# Patient Record
Sex: MALE | Race: WHITE | Employment: OTHER | URBAN - METROPOLITAN AREA
[De-identification: names, ages, dates, MRNs, and addresses within clinical notes are randomized per-mention and may not be internally consistent; named-entity substitution may affect disease eponyms.]

---

## 2014-04-18 LAB — COLONOSCOPY, EXTERNAL: NORMAL

## 2017-05-16 ENCOUNTER — OFFICE VISIT (OUTPATIENT)
Dept: FAMILY MEDICINE CLINIC | Age: 62
End: 2017-05-16

## 2017-05-16 VITALS
BODY MASS INDEX: 25.41 KG/M2 | WEIGHT: 198 LBS | RESPIRATION RATE: 18 BRPM | OXYGEN SATURATION: 97 % | HEART RATE: 82 BPM | TEMPERATURE: 97.5 F | SYSTOLIC BLOOD PRESSURE: 207 MMHG | HEIGHT: 74 IN | DIASTOLIC BLOOD PRESSURE: 119 MMHG

## 2017-05-16 DIAGNOSIS — E78.2 MIXED HYPERLIPIDEMIA: ICD-10-CM

## 2017-05-16 DIAGNOSIS — I10 ESSENTIAL HYPERTENSION: Primary | ICD-10-CM

## 2017-05-16 DIAGNOSIS — Z76.89 ENCOUNTER TO ESTABLISH CARE: ICD-10-CM

## 2017-05-16 RX ORDER — FENOFIBRATE 48 MG/1
TABLET, COATED ORAL
COMMUNITY
End: 2017-05-17 | Stop reason: SDUPTHER

## 2017-05-16 RX ORDER — CLOPIDOGREL BISULFATE 75 MG/1
TABLET ORAL
COMMUNITY
End: 2017-05-17 | Stop reason: SDUPTHER

## 2017-05-16 RX ORDER — AMLODIPINE BESYLATE 10 MG/1
TABLET ORAL DAILY
COMMUNITY
End: 2017-05-31 | Stop reason: DRUGHIGH

## 2017-05-16 RX ORDER — ASCORBIC ACID 500 MG
TABLET ORAL
COMMUNITY

## 2017-05-16 RX ORDER — NEBIVOLOL 5 MG/1
TABLET ORAL DAILY
COMMUNITY
End: 2017-05-17 | Stop reason: SDUPTHER

## 2017-05-16 RX ORDER — VALSARTAN 320 MG/1
TABLET ORAL DAILY
COMMUNITY
End: 2017-05-17 | Stop reason: SDUPTHER

## 2017-05-16 NOTE — PROGRESS NOTES
Betsy Ro is here today as a new patient to establish care with provider. 1. Have you been to the ER, urgent care clinic since your last visit? Hospitalized since your last visit? No    2. Have you seen or consulted any other health care providers outside of the [address] [place] [address] since your last visit? Include any pap smears or colon screening. No    Patients BP rechecked, noted to have increased to 230/110; Patient sent to ED per Jose Stock NP; Called to give report to ED at [facility], no answer.

## 2017-05-16 NOTE — PROGRESS NOTES
Chief Complaint   Patient presents with    Establish Care     HPI:    This is a 59 y/o male patient who presents to establish care with a new PCP. Patient denies SOB, CP, dizziness, headache. Denies taking any medication for pain. Former pt of Dr Reyes Self- last seen Novemeber    Hyperlipidemia: currently taking Fenofibrate and was recently prescribed a statin which he states he refused to take because of side effects. HTN: BP elevated today-  207/119  Manual repeat by me - 220/120   He confirms he has not taken BP med since Novemeber      ROS: Pt denies: Wt loss, Fever/Chills, HA, Visual changes, Fatigue, Chest pain, SOB, ALMANZAR, Abd pain, N/V/D/C, Blood in stool or urine, Edema. Pertinent positive as above in HPI. All others were negative      Past Medical History:   Diagnosis Date    Hypercholesterolemia     Hypertension        History reviewed. No pertinent surgical history. History reviewed. No pertinent family history. Social History     Social History    Marital status:      Spouse name: N/A    Number of children: N/A    Years of education: N/A     Occupational History    Not on file. Social History Main Topics    Smoking status: Current Every Day Smoker     Packs/day: 1.00     Types: Cigarettes    Smokeless tobacco: Not on file    Alcohol use Yes      Comment: OCCASSIONALLY    Drug use: No    Sexual activity: Yes     Partners: Female     Other Topics Concern    Not on file     Social History Narrative     Current Outpatient Prescriptions   Medication Sig Dispense Refill    amLODIPine (NORVASC) 10 mg tablet Take  by mouth daily.  ascorbic acid, vitamin C, (VITAMIN C) 500 mg tablet Take  by mouth.  fenofibrate nanocrystallized (TRICOR) 48 mg tablet Take 1 Tab by mouth daily. 30 Tab 1    valsartan (DIOVAN) 320 mg tablet Take 1 Tab by mouth daily. 320 Tab 1    clopidogrel (PLAVIX) 75 mg tab Take 1 Tab by mouth daily.  30 Tab 1    nebivolol (BYSTOLIC) 5 mg tablet Take 1 Tab by mouth daily. 30 Tab 1     No Known Allergies    Physical Exam:    Vital Signs:   Visit Vitals    BP (!) 207/119    Pulse 82    Temp 97.5 °F (36.4 °C) (Oral)    Resp 18    Ht 6' 2\" (1.88 m)    Wt 198 lb (89.8 kg)    SpO2 97%    BMI 25.42 kg/m2         General: a, a & o x 3, afebrile,  interacting appropriately, in no acute distress  HEENT: head normocephalic and atraumatic, conjuctiva clear  Skin: warm and dry, no rashes , no bruises, no skin lesions  Neck: supple, symmetrical, no palpable mass, no thyromegaly  Respiratory: lung sounds clear bilaterally, no wheezes or crackles  Cardiovascular: normal S1S2, regular rate and rhythm, no murmurs  Abdomen: non-distended, normal bowel sounds x 4 quadrants, soft, non-tender to palpation  Musculoskeletal: normal ROM on all joints, no swelling or deformity  Psychiatric: a, a & o x 3, appropriate mood and affect, no thought disorder    Assessment/Plan:      ICD-10-CM ICD-9-CM    1. Essential hypertension I10 401.9 BP elevated in our office today- 220/120  [facility] BP control. He was advised to return to our office tomorrow for BP check tomorrow   2. Encounter to establish care Z76.89 V65.8    3. Mixed hyperlipidemia E78.2 272.2            Additional Notes: Discussed today's diagnosis, treatment plans. Discussed medication indications and side effects. After Visit Summary: Provided and discussed printed patient instructions. Answered questions in relation to today's diagnosis.   Follow-up Disposition: return tomorrow for BP check          ANABELLE Phelan  [address] [facility],[address]

## 2017-05-16 NOTE — MR AVS SNAPSHOT
Visit Information Date & Time Provider Department Dept. Phone Encounter #  
 5/16/2017  3:30 PM Tere Interiano NP [facility] [id number] Follow-up Instructions Return in about 1 day (around 5/17/2017) for follow up, HTN patient advised to go to the ER from our office. BP today elevated 230/130. Upcoming Health Maintenance Date Due Hepatitis C Screening 1955 DTaP/Tdap/Td series (1 - Tdap) 1/12/1975 FOBT Q 1 YEAR AGE 50-75 1/12/2004 ZOSTER VACCINE AGE 60> 1/12/2014 INFLUENZA AGE 9 TO ADULT 8/1/2017 Allergies as of 5/16/2017  Review Complete On: 5/16/2017 By: Alesia Hull LPN No Known Allergies Current Immunizations  Never Reviewed No immunizations on file. Not reviewed this visit You Were Diagnosed With   
  
 Codes Comments Essential hypertension    -  Primary ICD-10-CM: I10 
ICD-9-CM: 401.9 Vitals BP Pulse Temp Resp Height(growth percentile) Weight(growth percentile) (!) 207/119 82 97.5 °F (36.4 °C) (Oral) 18 6' 2\" (1.88 m) 198 lb (89.8 kg) SpO2 BMI Smoking Status 97% 25.42 kg/m2 Current Every Day Smoker BMI and BSA Data Body Mass Index Body Surface Area  
 25.42 kg/m 2 2.17 m 2 Preferred Pharmacy Pharmacy Name Phone [id number], [address] [phone] Your Updated Medication List  
  
   
This list is accurate as of: 5/16/17  3:56 PM.  Always use your most recent med list. amLODIPine 10 mg tablet Commonly known as:  Danielle Million Take  by mouth daily. BYSTOLIC 5 mg tablet Generic drug:  nebivolol Take  by mouth daily. clopidogrel 75 mg Tab Commonly known as:  PLAVIX Take  by mouth. fenofibrate nanocrystallized 48 mg tablet Commonly known as:  [employer] Take  by mouth.  
  
 valsartan 320 mg tablet Commonly known as:  DIOVAN Take  by mouth daily. VITAMIN C 500 mg tablet Generic drug:  ascorbic acid (vitamin C) Take  by mouth. Follow-up Instructions Return in about 1 day (around 5/17/2017) for follow up, HTN patient advised to go to the ER from our office. BP today elevated 230/130. Patient Instructions High Blood Pressure: Care Instructions Your Care Instructions If your blood pressure is usually above 140/90, you have high blood pressure, or hypertension. That means the top number is 140 or higher or the bottom number is 90 or higher, or both. Despite what a lot of people think, high blood pressure usually doesn't cause headaches or make you feel dizzy or lightheaded. It usually has no symptoms. But it does increase your risk for heart attack, stroke, and kidney or eye damage. The higher your blood pressure, the more your risk increases. Your doctor will give you a goal for your blood pressure. Your goal will be based on your health and your age. An example of a goal is to keep your blood pressure below 140/90. Lifestyle changes, such as eating healthy and being active, are always important to help lower blood pressure. You might also take medicine to reach your blood pressure goal. 
Follow-up care is a key part of your treatment and safety. Be sure to make and go to all appointments, and call your doctor if you are having problems. It's also a good idea to know your test results and keep a list of the medicines you take. How can you care for yourself at home? Medical treatment · If you stop taking your medicine, your blood pressure will go back up. You may take one or more types of medicine to lower your blood pressure. Be safe with medicines. Take your medicine exactly as prescribed. Call your doctor if you think you are having a problem with your medicine. · Talk to your doctor before you start taking aspirin every day. Aspirin can help certain people lower their risk of a heart attack or stroke.  But taking aspirin isn't right for everyone, because it can cause serious bleeding. · See your doctor regularly. You may need to see the doctor more often at first or until your blood pressure comes down. · If you are taking blood pressure medicine, talk to your doctor before you take decongestants or anti-inflammatory medicine, such as ibuprofen. Some of these medicines can raise blood pressure. · Learn how to check your blood pressure at home. Lifestyle changes · Stay at a healthy weight. This is especially important if you put on weight around the waist. Losing even 10 pounds can help you lower your blood pressure. · If your doctor recommends it, get more exercise. Walking is a good choice. Bit by bit, increase the amount you walk every day. Try for at least 30 minutes on most days of the week. You also may want to swim, bike, or do other activities. · Avoid or limit alcohol. Talk to your doctor about whether you can drink any alcohol. · Try to limit how much sodium you eat to less than 2,300 milligrams (mg) a day. Your doctor may ask you to try to eat less than 1,500 mg a day. · Eat plenty of fruits (such as bananas and oranges), vegetables, legumes, whole grains, and low-fat dairy products. · Lower the amount of saturated fat in your diet. Saturated fat is found in animal products such as milk, cheese, and meat. Limiting these foods may help you lose weight and also lower your risk for heart disease. · Do not smoke. Smoking increases your risk for heart attack and stroke. If you need help quitting, talk to your doctor about stop-smoking programs and medicines. These can increase your chances of quitting for good. When should you call for help? Call 911 anytime you think you may need emergency care. This may mean having symptoms that suggest that your blood pressure is causing a serious heart or blood vessel problem. Your blood pressure may be over 180/110. For example, call 911 if: · You have symptoms of a heart attack. These may include: ¨ Chest pain or pressure, or a strange feeling in the chest. 
¨ Sweating. ¨ Shortness of breath. ¨ Nausea or vomiting. ¨ Pain, pressure, or a strange feeling in the back, neck, jaw, or upper belly or in one or both shoulders or arms. ¨ Lightheadedness or sudden weakness. ¨ A fast or irregular heartbeat. · You have symptoms of a stroke. These may include: 
¨ Sudden numbness, tingling, weakness, or loss of movement in your face, arm, or leg, especially on only one side of your body. ¨ Sudden vision changes. ¨ Sudden trouble speaking. ¨ Sudden confusion or trouble understanding simple statements. ¨ Sudden problems with walking or balance. ¨ A sudden, severe headache that is different from past headaches. · You have severe back or belly pain. Do not wait until your blood pressure comes down on its own. Get help right away. Call your doctor now or seek immediate care if: 
· Your blood pressure is much higher than normal (such as 180/110 or higher), but you don't have symptoms. · You think high blood pressure is causing symptoms, such as: ¨ Severe headache. ¨ Blurry vision. Watch closely for changes in your health, and be sure to contact your doctor if: 
· Your blood pressure measures 140/90 or higher at least 2 times. That means the top number is 140 or higher or the bottom number is 90 or higher, or both. · You think you may be having side effects from your blood pressure medicine. · Your blood pressure is usually normal, but it goes above normal at least 2 times. Where can you learn more? Go to http://jemma-javi.info/. Enter V520 in the search box to learn more about \"High Blood Pressure: Care Instructions. \" Current as of: August 8, 2016 Content Version: 11.2 © 3101-1343 [employer].  Care instructions adapted under license by [employer] (which disclaims liability or warranty for this information). If you have questions about a medical condition or this instruction, always ask your healthcare professional. [address] any warranty or liability for your use of this information. Introducing [facility] [place] [facility]! [employer] introduces [employer] patient portal. Now you can access parts of your medical record, email your doctor's office, and request medication refills online. 1. In your internet browser, go to [email or web address] 2. Click on the First Time User? Click Here link in the Sign In box. You will see the New Member Sign Up page. 3. Enter your [employer] Access Code exactly as it appears below. You will not need to use this code after youve completed the sign-up process. If you do not sign up before the expiration date, you must request a new code. · [employer] Access Code: [email or web address] Expires: 8/14/2017  3:55 PM 
 
4. Enter the last four digits of your Social Security Number (xxxx) and Date of Birth (mm/dd/yyyy) as indicated and click Submit. You will be taken to the next sign-up page. 5. Create a [employer] ID. This will be your [employer] login ID and cannot be changed, so think of one that is secure and easy to remember. 6. Create a [employer] password. You can change your password at any time. 7. Enter your Password Reset Question and Answer. This can be used at a later time if you forget your password. 8. Enter your e-mail address. You will receive e-mail notification when new information is available in [address] 9. Click Sign Up. You can now view and download portions of your medical record. 10. Click the Download Summary menu link to download a portable copy of your medical information. If you have questions, please visit the Frequently Asked Questions section of the [employer] website. Remember, [employer] is NOT to be used for urgent needs. For medical emergencies, dial 911. Now available from your iPhone and Android! Please provide this summary of care documentation to your next provider. If you have any questions after today's visit, please call [phone].

## 2017-05-16 NOTE — PATIENT INSTRUCTIONS

## 2017-05-17 ENCOUNTER — CLINICAL SUPPORT (OUTPATIENT)
Dept: FAMILY MEDICINE CLINIC | Age: 62
End: 2017-05-17

## 2017-05-17 VITALS
HEART RATE: 66 BPM | DIASTOLIC BLOOD PRESSURE: 93 MMHG | SYSTOLIC BLOOD PRESSURE: 169 MMHG | TEMPERATURE: 98.6 F | RESPIRATION RATE: 16 BRPM | WEIGHT: 198 LBS | BODY MASS INDEX: 25.41 KG/M2 | HEIGHT: 74 IN | OXYGEN SATURATION: 98 %

## 2017-05-17 DIAGNOSIS — E78.2 MIXED HYPERLIPIDEMIA: ICD-10-CM

## 2017-05-17 DIAGNOSIS — I10 ESSENTIAL HYPERTENSION: Primary | ICD-10-CM

## 2017-05-17 RX ORDER — NEBIVOLOL 5 MG/1
5 TABLET ORAL DAILY
Qty: 30 TAB | Refills: 1 | Status: SHIPPED | OUTPATIENT
Start: 2017-05-17 | End: 2017-11-20 | Stop reason: SDUPTHER

## 2017-05-17 RX ORDER — VALSARTAN 320 MG/1
320 TABLET ORAL DAILY
Qty: 320 TAB | Refills: 1 | Status: SHIPPED | OUTPATIENT
Start: 2017-05-17 | End: 2017-11-20 | Stop reason: SDUPTHER

## 2017-05-17 RX ORDER — FENOFIBRATE 48 MG/1
48 TABLET, COATED ORAL DAILY
Qty: 30 TAB | Refills: 1 | Status: SHIPPED | OUTPATIENT
Start: 2017-05-17 | End: 2017-06-01 | Stop reason: ALTCHOICE

## 2017-05-17 RX ORDER — CLOPIDOGREL BISULFATE 75 MG/1
75 TABLET ORAL DAILY
Qty: 30 TAB | Refills: 1 | Status: SHIPPED | OUTPATIENT
Start: 2017-05-17 | End: 2017-07-07 | Stop reason: SDUPTHER

## 2017-05-17 NOTE — PROGRESS NOTES
Chief Complaint   Patient presents with    Follow-up     ED visit for elevated BP 5/16/2017     HPI:    A 60 y.o pleasant male who presents for 1 day follow up elevated blood pressure. Patient was sent to the ER for elevated office BP yesterday of 220/120  Today he states he feels fine. He was stabilized at the ED and sent home on Bystolic and Amlodipine. Today BP still elevated 169/93 but improved from yesterday. Manual repeat by me 167/100  Discussed with patient plan to add a third agent. Patient denies SOB, CP, dizziness, headache. ROS: pertinent positives as noted in HPI. All others were negative. Past Medical History:   Diagnosis Date    Hypercholesterolemia     Hypertension      Social History     Social History    Marital status:      Spouse name: N/A    Number of children: N/A    Years of education: N/A     Occupational History    Not on file. Social History Main Topics    Smoking status: Current Every Day Smoker     Packs/day: 1.00     Types: Cigarettes    Smokeless tobacco: Not on file    Alcohol use Yes      Comment: OCCASSIONALLY    Drug use: No    Sexual activity: Yes     Partners: Female     Other Topics Concern    Not on file     Social History Narrative     Current Outpatient Prescriptions   Medication Sig Dispense Refill    Omega-3 Fatty Acids (FISH OIL) 500 mg cap Take  by mouth.  aspirin 81 mg chewable tablet Take 81 mg by mouth daily.  fenofibrate nanocrystallized (TRICOR) 48 mg tablet Take 1 Tab by mouth daily. 30 Tab 1    valsartan (DIOVAN) 320 mg tablet Take 1 Tab by mouth daily. 320 Tab 1    clopidogrel (PLAVIX) 75 mg tab Take 1 Tab by mouth daily. 30 Tab 1    nebivolol (BYSTOLIC) 5 mg tablet Take 1 Tab by mouth daily. 30 Tab 1    amLODIPine (NORVASC) 10 mg tablet Take  by mouth daily.  ascorbic acid, vitamin C, (VITAMIN C) 500 mg tablet Take  by mouth.        No Known Allergies        Physical Exam:    Vital Signs:   Visit Vitals  BP (!) 169/93    Pulse 66    Temp 98.6 °F (37 °C) (Oral)    Resp 16    Ht 6' 2\" (1.88 m)    Wt 198 lb (89.8 kg)    SpO2 98%    BMI 25.42 kg/m2         General: a, a & o x 3, afebrile,  interacting appropriately, in no acute distress  HEENT: head normocephalic and atraumatic, conjuctiva clear  Respiratory: lung sounds clear bilaterally, no wheezes or crackles  Cardiovascular: normal S1S2, regular rate and rhythm, no murmurs      Assessment/Plan:      ICD-10-CM ICD-9-CM    1. Essential hypertension I10 401.9 valsartan (DIOVAN) 320 mg tablet      clopidogrel (PLAVIX) 75 mg tab      nebivolol (BYSTOLIC) 5 mg tablet    Low salt diet advised. Patient advised the importance of medication compliance for good BP control. 2. Mixed hyperlipidemia E78.2 272.2 fenofibrate nanocrystallized (TRICOR) 48 mg tablet         Additional Notes: Discussed today's diagnosis, treatment plans. Discussed medication indications and side effects. After Visit Summary: Provided and discussed printed patient instructions. Answered questions in relation to today's diagnosis.   Follow-up Disposition: 1 week HTN          Jason Lan NP-BC  Family Medicine  [facility]        Orders Placed This Encounter    fenofibrate nanocrystallized (TRICOR) 48 mg tablet    valsartan (DIOVAN) 320 mg tablet    clopidogrel (PLAVIX) 75 mg tab    nebivolol (BYSTOLIC) 5 mg tablet

## 2017-05-17 NOTE — PATIENT INSTRUCTIONS
DASH Diet: Care Instructions  Your Care Instructions  The DASH diet is an eating plan that can help lower your blood pressure. DASH stands for Dietary Approaches to Stop Hypertension. Hypertension is high blood pressure. The DASH diet focuses on eating foods that are high in calcium, potassium, and magnesium. These nutrients can lower blood pressure. The foods that are highest in these nutrients are fruits, vegetables, low-fat dairy products, nuts, seeds, and legumes. But taking calcium, potassium, and magnesium supplements instead of eating foods that are high in those nutrients does not have the same effect. The DASH diet also includes whole grains, fish, and poultry. The DASH diet is one of several lifestyle changes your doctor may recommend to lower your high blood pressure. Your doctor may also want you to decrease the amount of sodium in your diet. Lowering sodium while following the DASH diet can lower blood pressure even further than just the DASH diet alone. Follow-up care is a key part of your treatment and safety. Be sure to make and go to all appointments, and call your doctor if you are having problems. It's also a good idea to know your test results and keep a list of the medicines you take. How can you care for yourself at home? Following the DASH diet  · Eat 4 to 5 servings of fruit each day. A serving is 1 medium-sized piece of fruit, ½ cup chopped or canned fruit, 1/4 cup dried fruit, or 4 ounces (½ cup) of fruit juice. Choose fruit more often than fruit juice. · Eat 4 to 5 servings of vegetables each day. A serving is 1 cup of lettuce or raw leafy vegetables, ½ cup of chopped or cooked vegetables, or 4 ounces (½ cup) of vegetable juice. Choose vegetables more often than vegetable juice. · Get 2 to 3 servings of low-fat and fat-free dairy each day. A serving is 8 ounces of milk, 1 cup of yogurt, or 1 ½ ounces of cheese. · Eat 6 to 8 servings of grains each day.  A serving is 1 slice of bread, 1 ounce of dry cereal, or ½ cup of cooked rice, pasta, or cooked cereal. Try to choose whole-grain products as much as possible. · Limit lean meat, poultry, and fish to 2 servings each day. A serving is 3 ounces, about the size of a deck of cards. · Eat 4 to 5 servings of nuts, seeds, and legumes (cooked dried beans, lentils, and split peas) each week. A serving is 1/3 cup of nuts, 2 tablespoons of seeds, or ½ cup of cooked beans or peas. · Limit fats and oils to 2 to 3 servings each day. A serving is 1 teaspoon of vegetable oil or 2 tablespoons of salad dressing. · Limit sweets and added sugars to 5 servings or less a week. A serving is 1 tablespoon jelly or jam, ½ cup sorbet, or 1 cup of lemonade. · Eat less than 2,300 milligrams (mg) of sodium a day. If you limit your sodium to 1,500 mg a day, you can lower your blood pressure even more. Tips for success  · Start small. Do not try to make dramatic changes to your diet all at once. You might feel that you are missing out on your favorite foods and then be more likely to not follow the plan. Make small changes, and stick with them. Once those changes become habit, add a few more changes. · Try some of the following:  ¨ Make it a goal to eat a fruit or vegetable at every meal and at snacks. This will make it easy to get the recommended amount of fruits and vegetables each day. ¨ Try yogurt topped with fruit and nuts for a snack or healthy dessert. ¨ Add lettuce, tomato, cucumber, and onion to sandwiches. ¨ Combine a ready-made pizza crust with low-fat mozzarella cheese and lots of vegetable toppings. Try using tomatoes, squash, spinach, broccoli, carrots, cauliflower, and onions. ¨ Have a variety of cut-up vegetables with a low-fat dip as an appetizer instead of chips and dip. ¨ Sprinkle sunflower seeds or chopped almonds over salads. Or try adding chopped walnuts or almonds to cooked vegetables. ¨ Try some vegetarian meals using beans and peas. Add garbanzo or kidney beans to salads. Make burritos and tacos with mashed higgins beans or black beans. Where can you learn more? Go to http://jemma-javi.info/. Enter Y967 in the search box to learn more about \"DASH Diet: Care Instructions. \"  Current as of: March 23, 2016  Content Version: 11.2  © 1296-1836 [employer]. Care instructions adapted under license by [employer] (which disclaims liability or warranty for this information). If you have questions about a medical condition or this instruction, always ask your healthcare professional. [place] [address] any warranty or liability for your use of this information. High Blood Pressure: Care Instructions  Your Care Instructions  If your blood pressure is usually above 140/90, you have high blood pressure, or hypertension. That means the top number is 140 or higher or the bottom number is 90 or higher, or both. Despite what a lot of people think, high blood pressure usually doesn't cause headaches or make you feel dizzy or lightheaded. It usually has no symptoms. But it does increase your risk for heart attack, stroke, and kidney or eye damage. The higher your blood pressure, the more your risk increases. Your doctor will give you a goal for your blood pressure. Your goal will be based on your health and your age. An example of a goal is to keep your blood pressure below 140/90. Lifestyle changes, such as eating healthy and being active, are always important to help lower blood pressure. You might also take medicine to reach your blood pressure goal.  Follow-up care is a key part of your treatment and safety. Be sure to make and go to all appointments, and call your doctor if you are having problems. It's also a good idea to know your test results and keep a list of the medicines you take. How can you care for yourself at home?   Medical treatment  · If you stop taking your medicine, your blood pressure will go back up. You may take one or more types of medicine to lower your blood pressure. Be safe with medicines. Take your medicine exactly as prescribed. Call your doctor if you think you are having a problem with your medicine. · Talk to your doctor before you start taking aspirin every day. Aspirin can help certain people lower their risk of a heart attack or stroke. But taking aspirin isn't right for everyone, because it can cause serious bleeding. · See your doctor regularly. You may need to see the doctor more often at first or until your blood pressure comes down. · If you are taking blood pressure medicine, talk to your doctor before you take decongestants or anti-inflammatory medicine, such as ibuprofen. Some of these medicines can raise blood pressure. · Learn how to check your blood pressure at home. Lifestyle changes  · Stay at a healthy weight. This is especially important if you put on weight around the waist. Losing even 10 pounds can help you lower your blood pressure. · If your doctor recommends it, get more exercise. Walking is a good choice. Bit by bit, increase the amount you walk every day. Try for at least 30 minutes on most days of the week. You also may want to swim, bike, or do other activities. · Avoid or limit alcohol. Talk to your doctor about whether you can drink any alcohol. · Try to limit how much sodium you eat to less than 2,300 milligrams (mg) a day. Your doctor may ask you to try to eat less than 1,500 mg a day. · Eat plenty of fruits (such as bananas and oranges), vegetables, legumes, whole grains, and low-fat dairy products. · Lower the amount of saturated fat in your diet. Saturated fat is found in animal products such as milk, cheese, and meat. Limiting these foods may help you lose weight and also lower your risk for heart disease. · Do not smoke. Smoking increases your risk for heart attack and stroke.  If you need help quitting, talk to your doctor about stop-smoking programs and medicines. These can increase your chances of quitting for good. When should you call for help? Call 911 anytime you think you may need emergency care. This may mean having symptoms that suggest that your blood pressure is causing a serious heart or blood vessel problem. Your blood pressure may be over 180/110. For example, call 911 if:  · You have symptoms of a heart attack. These may include:  ¨ Chest pain or pressure, or a strange feeling in the chest.  ¨ Sweating. ¨ Shortness of breath. ¨ Nausea or vomiting. ¨ Pain, pressure, or a strange feeling in the back, neck, jaw, or upper belly or in one or both shoulders or arms. ¨ Lightheadedness or sudden weakness. ¨ A fast or irregular heartbeat. · You have symptoms of a stroke. These may include:  ¨ Sudden numbness, tingling, weakness, or loss of movement in your face, arm, or leg, especially on only one side of your body. ¨ Sudden vision changes. ¨ Sudden trouble speaking. ¨ Sudden confusion or trouble understanding simple statements. ¨ Sudden problems with walking or balance. ¨ A sudden, severe headache that is different from past headaches. · You have severe back or belly pain. Do not wait until your blood pressure comes down on its own. Get help right away. Call your doctor now or seek immediate care if:  · Your blood pressure is much higher than normal (such as 180/110 or higher), but you don't have symptoms. · You think high blood pressure is causing symptoms, such as:  ¨ Severe headache. ¨ Blurry vision. Watch closely for changes in your health, and be sure to contact your doctor if:  · Your blood pressure measures 140/90 or higher at least 2 times. That means the top number is 140 or higher or the bottom number is 90 or higher, or both. · You think you may be having side effects from your blood pressure medicine. · Your blood pressure is usually normal, but it goes above normal at least 2 times.   Where can you learn more? Go to http://jemma-javi.info/. Enter T471 in the search box to learn more about \"High Blood Pressure: Care Instructions. \"  Current as of: August 8, 2016  Content Version: 11.2  © 3496-1071 [employer]. Care instructions adapted under license by [employer] (which disclaims liability or warranty for this information). If you have questions about a medical condition or this instruction, always ask your healthcare professional. [facility] [address] any warranty or liability for your use of this information. Low Sodium Diet (2,000 Milligram): Care Instructions  Your Care Instructions  Too much sodium causes your body to hold on to extra water. This can raise your blood pressure and force your heart and kidneys to work harder. In very serious cases, this could cause you to be put in the hospital. It might even be life-threatening. By limiting sodium, you will feel better and lower your risk of serious problems. The most common source of sodium is salt. People get most of the salt in their diet from canned, prepared, and packaged foods. Fast food and restaurant meals also are very high in sodium. Your doctor will probably limit your sodium to less than 2,000 milligrams (mg) a day. This limit counts all the sodium in prepared and packaged foods and any salt you add to your food. Follow-up care is a key part of your treatment and safety. Be sure to make and go to all appointments, and call your doctor if you are having problems. It's also a good idea to know your test results and keep a list of the medicines you take. How can you care for yourself at home? Read food labels  · Read labels on cans and food packages. The labels tell you how much sodium is in each serving. Make sure that you look at the serving size. If you eat more than the serving size, you have eaten more sodium.   · Food labels also tell you the Percent Daily Value for sodium. Choose products with low Percent Daily Values for sodium. · Be aware that sodium can come in forms other than salt, including monosodium glutamate (MSG), sodium citrate, and sodium bicarbonate (baking soda). MSG is often added to Asian food. When you eat out, you can sometimes ask for food without MSG or added salt. Buy low-sodium foods  · Buy foods that are labeled \"unsalted\" (no salt added), \"sodium-free\" (less than 5 mg of sodium per serving), or \"low-sodium\" (less than 140 mg of sodium per serving). Foods labeled \"reduced-sodium\" and \"light sodium\" may still have too much sodium. Be sure to read the label to see how much sodium you are getting. · Buy fresh vegetables, or frozen vegetables without added sauces. Buy low-sodium versions of canned vegetables, soups, and other canned goods. Prepare low-sodium meals  · Cut back on the amount of salt you use in cooking. This will help you adjust to the taste. Do not add salt after cooking. One teaspoon of salt has about 2,300 mg of sodium. · Take the salt shaker off the table. · Flavor your food with garlic, lemon juice, onion, vinegar, herbs, and spices. Do not use soy sauce, lite soy sauce, steak sauce, onion salt, garlic salt, celery salt, mustard, or ketchup on your food. · Use low-sodium salad dressings, sauces, and ketchup. Or make your own salad dressings and sauces without adding salt. · Use less salt (or none) when recipes call for it. You can often use half the salt a recipe calls for without losing flavor. Other foods such as rice, pasta, and grains do not need added salt. · Rinse canned vegetables, and cook them in fresh water. This removes some--but not all--of the salt. · Avoid water that is naturally high in sodium or that has been treated with water softeners, which add sodium. Call your local water company to find out the sodium content of your water supply.  If you buy bottled water, read the label and choose a sodium-free brand. Avoid high-sodium foods  · Avoid eating:  ¨ Smoked, cured, salted, and canned meat, fish, and poultry. ¨ Ham, pat, hot dogs, and luncheon meats. ¨ Regular, hard, and processed cheese and regular peanut butter. ¨ Crackers with salted tops, and other salted snack foods such as pretzels, chips, and salted popcorn. ¨ Frozen prepared meals, unless labeled low-sodium. ¨ Canned and dried soups, broths, and bouillon, unless labeled sodium-free or low-sodium. ¨ Canned vegetables, unless labeled sodium-free or low-sodium. ¨ [place] fries, pizza, tacos, and other fast foods. ¨ Pickles, olives, ketchup, and other condiments, especially soy sauce, unless labeled sodium-free or low-sodium. Where can you learn more? Go to http://jemma-javi.info/. Enter M954 in the search box to learn more about \"Low Sodium Diet (2,000 Milligram): Care Instructions. \"  Current as of: July 26, 2016  Content Version: 11.2  © 6792-8478 [employer]. Care instructions adapted under license by [employer] (which disclaims liability or warranty for this information). If you have questions about a medical condition or this instruction, always ask your healthcare professional. [address] any warranty or liability for your use of this information.

## 2017-05-17 NOTE — LETTER
NOTIFICATION OF RETURN TO WORK / SCHOOL 
 
5/17/2017 Mr. Demarco Reed [address] [address] [facility] [id number] To Whom It May Concern: 
 
Demarco Reed was under the care of [facility] He will return to work on 5/17/17 with restrictions to work as tolerated until evaluated in one week. If there are questions or concerns please have the patient contact our office. Sincerely, Michael Bass NP

## 2017-05-17 NOTE — MR AVS SNAPSHOT
Visit Information Date & Time Provider Department Dept. Phone Encounter #  
 5/17/2017  3:30 PM Dulce Reardon NP Delroy Heaton [id number] Follow-up Instructions Return in about 1 week (around 5/24/2017) for follow up, HTN. Your Appointments 5/17/2017  3:30 PM  
Nurse [facility] Appt Note: per inst .... return to office - s/w Dayanara Velásquez Kedar. [address]  
  
   
 [address] Upcoming Health Maintenance Date Due Hepatitis C Screening 1955 Pneumococcal 19-64 Medium Risk (1 of 1 - PPSV23) 1/12/1973 DTaP/Tdap/Td series (1 - Tdap) 1/12/1975 FOBT Q 1 YEAR AGE 50-75 1/12/2004 ZOSTER VACCINE AGE 60> 1/12/2014 INFLUENZA AGE 9 TO ADULT 8/1/2017 Allergies as of 5/17/2017  Review Complete On: 5/17/2017 By: Dulce Reardon NP No Known Allergies Current Immunizations  Never Reviewed No immunizations on file. Not reviewed this visit You Were Diagnosed With   
  
 Codes Comments Essential hypertension    -  Primary ICD-10-CM: I10 
ICD-9-CM: 401.9 Mixed hyperlipidemia     ICD-10-CM: E78.2 ICD-9-CM: 272.2 Vitals BP Pulse Temp Resp Height(growth percentile) Weight(growth percentile) (!) 169/93 66 98.6 °F (37 °C) (Oral) 16 6' 2\" (1.88 m) 198 lb (89.8 kg) SpO2 BMI Smoking Status 98% 25.42 kg/m2 Current Every Day Smoker BMI and BSA Data Body Mass Index Body Surface Area  
 25.42 kg/m 2 2.17 m 2 Preferred Pharmacy Pharmacy Name Phone [id number], [place] [address] [place] [address] [phone] Your Updated Medication List  
  
   
This list is accurate as of: 5/17/17 12:36 PM.  Always use your most recent med list. amLODIPine 10 mg tablet Commonly known as:  Ricky Sharma Take  by mouth daily. clopidogrel 75 mg Tab Commonly known as:  PLAVIX Take 1 Tab by mouth daily. fenofibrate nanocrystallized 48 mg tablet Commonly known as:  [employer] Take 1 Tab by mouth daily. nebivolol 5 mg tablet Commonly known as:  BYSTOLIC Take 1 Tab by mouth daily. valsartan 320 mg tablet Commonly known as:  DIOVAN Take 1 Tab by mouth daily. VITAMIN C 500 mg tablet Generic drug:  ascorbic acid (vitamin C) Take  by mouth. Prescriptions Sent to Pharmacy Refills  
 fenofibrate nanocrystallized (TRICOR) 48 mg tablet 1 Sig: Take 1 Tab by mouth daily. Class: Normal  
 Pharmacy: [facility], [address] Ph #: [phone] Route: Oral  
 valsartan (DIOVAN) 320 mg tablet 1 Sig: Take 1 Tab by mouth daily. Class: Normal  
 Pharmacy: [facility], [address] Ph #: [phone] Route: Oral  
 clopidogrel (PLAVIX) 75 mg tab 1 Sig: Take 1 Tab by mouth daily. Class: Normal  
 Pharmacy: [facility], [address] Ph #: [phone] Route: Oral  
 nebivolol (BYSTOLIC) 5 mg tablet 1 Sig: Take 1 Tab by mouth daily. Class: Normal  
 Pharmacy: [facility], [address] Ph #: [phone] Route: Oral  
  
Follow-up Instructions Return in about 1 week (around 5/24/2017) for follow up, HTN. Patient Instructions DASH Diet: Care Instructions Your Care Instructions The DASH diet is an eating plan that can help lower your blood pressure. DASH stands for Dietary Approaches to Stop Hypertension. Hypertension is high blood pressure. The DASH diet focuses on eating foods that are high in calcium, potassium, and magnesium. These nutrients can lower blood pressure.  The foods that are highest in these nutrients are fruits, vegetables, low-fat dairy products, nuts, seeds, and legumes. But taking calcium, potassium, and magnesium supplements instead of eating foods that are high in those nutrients does not have the same effect. The DASH diet also includes whole grains, fish, and poultry. The DASH diet is one of several lifestyle changes your doctor may recommend to lower your high blood pressure. Your doctor may also want you to decrease the amount of sodium in your diet. Lowering sodium while following the DASH diet can lower blood pressure even further than just the DASH diet alone. Follow-up care is a key part of your treatment and safety. Be sure to make and go to all appointments, and call your doctor if you are having problems. It's also a good idea to know your test results and keep a list of the medicines you take. How can you care for yourself at home? Following the DASH diet · Eat 4 to 5 servings of fruit each day. A serving is 1 medium-sized piece of fruit, ½ cup chopped or canned fruit, 1/4 cup dried fruit, or 4 ounces (½ cup) of fruit juice. Choose fruit more often than fruit juice. · Eat 4 to 5 servings of vegetables each day. A serving is 1 cup of lettuce or raw leafy vegetables, ½ cup of chopped or cooked vegetables, or 4 ounces (½ cup) of vegetable juice. Choose vegetables more often than vegetable juice. · Get 2 to 3 servings of low-fat and fat-free dairy each day. A serving is 8 ounces of milk, 1 cup of yogurt, or 1 ½ ounces of cheese. · Eat 6 to 8 servings of grains each day. A serving is 1 slice of bread, 1 ounce of dry cereal, or ½ cup of cooked rice, pasta, or cooked cereal. Try to choose whole-grain products as much as possible. · Limit lean meat, poultry, and fish to 2 servings each day. A serving is 3 ounces, about the size of a deck of cards. · Eat 4 to 5 servings of nuts, seeds, and legumes (cooked dried beans, lentils, and split peas) each week.  A serving is 1/3 cup of nuts, 2 tablespoons of seeds, or ½ cup of cooked beans or peas. · Limit fats and oils to 2 to 3 servings each day. A serving is 1 teaspoon of vegetable oil or 2 tablespoons of salad dressing. · Limit sweets and added sugars to 5 servings or less a week. A serving is 1 tablespoon jelly or jam, ½ cup sorbet, or 1 cup of lemonade. · Eat less than 2,300 milligrams (mg) of sodium a day. If you limit your sodium to 1,500 mg a day, you can lower your blood pressure even more. Tips for success · Start small. Do not try to make dramatic changes to your diet all at once. You might feel that you are missing out on your favorite foods and then be more likely to not follow the plan. Make small changes, and stick with them. Once those changes become habit, add a few more changes. · Try some of the following: ¨ Make it a goal to eat a fruit or vegetable at every meal and at snacks. This will make it easy to get the recommended amount of fruits and vegetables each day. ¨ Try yogurt topped with fruit and nuts for a snack or healthy dessert. ¨ Add lettuce, tomato, cucumber, and onion to sandwiches. ¨ Combine a ready-made pizza crust with low-fat mozzarella cheese and lots of vegetable toppings. Try using tomatoes, squash, spinach, broccoli, carrots, cauliflower, and onions. ¨ Have a variety of cut-up vegetables with a low-fat dip as an appetizer instead of chips and dip. ¨ Sprinkle sunflower seeds or chopped almonds over salads. Or try adding chopped walnuts or almonds to cooked vegetables. ¨ Try some vegetarian meals using beans and peas. Add garbanzo or kidney beans to salads. Make burritos and tacos with mashed higgins beans or black beans. Where can you learn more? Go to http://jemma-javi.info/. Enter O302 in the search box to learn more about \"DASH Diet: Care Instructions. \" Current as of: March 23, 2016 Content Version: 11.2 © 6736-9906 [employer].  Care instructions adapted under license by [address] (which disclaims liability or warranty for this information). If you have questions about a medical condition or this instruction, always ask your healthcare professional. [address] any warranty or liability for your use of this information. High Blood Pressure: Care Instructions Your Care Instructions If your blood pressure is usually above 140/90, you have high blood pressure, or hypertension. That means the top number is 140 or higher or the bottom number is 90 or higher, or both. Despite what a lot of people think, high blood pressure usually doesn't cause headaches or make you feel dizzy or lightheaded. It usually has no symptoms. But it does increase your risk for heart attack, stroke, and kidney or eye damage. The higher your blood pressure, the more your risk increases. Your doctor will give you a goal for your blood pressure. Your goal will be based on your health and your age. An example of a goal is to keep your blood pressure below 140/90. Lifestyle changes, such as eating healthy and being active, are always important to help lower blood pressure. You might also take medicine to reach your blood pressure goal. 
Follow-up care is a key part of your treatment and safety. Be sure to make and go to all appointments, and call your doctor if you are having problems. It's also a good idea to know your test results and keep a list of the medicines you take. How can you care for yourself at home? Medical treatment · If you stop taking your medicine, your blood pressure will go back up. You may take one or more types of medicine to lower your blood pressure. Be safe with medicines. Take your medicine exactly as prescribed. Call your doctor if you think you are having a problem with your medicine. · Talk to your doctor before you start taking aspirin every day. Aspirin can help certain people lower their risk of a heart attack or stroke.  But taking aspirin isn't right for everyone, because it can cause serious bleeding. · See your doctor regularly. You may need to see the doctor more often at first or until your blood pressure comes down. · If you are taking blood pressure medicine, talk to your doctor before you take decongestants or anti-inflammatory medicine, such as ibuprofen. Some of these medicines can raise blood pressure. · Learn how to check your blood pressure at home. Lifestyle changes · Stay at a healthy weight. This is especially important if you put on weight around the waist. Losing even 10 pounds can help you lower your blood pressure. · If your doctor recommends it, get more exercise. Walking is a good choice. Bit by bit, increase the amount you walk every day. Try for at least 30 minutes on most days of the week. You also may want to swim, bike, or do other activities. · Avoid or limit alcohol. Talk to your doctor about whether you can drink any alcohol. · Try to limit how much sodium you eat to less than 2,300 milligrams (mg) a day. Your doctor may ask you to try to eat less than 1,500 mg a day. · Eat plenty of fruits (such as bananas and oranges), vegetables, legumes, whole grains, and low-fat dairy products. · Lower the amount of saturated fat in your diet. Saturated fat is found in animal products such as milk, cheese, and meat. Limiting these foods may help you lose weight and also lower your risk for heart disease. · Do not smoke. Smoking increases your risk for heart attack and stroke. If you need help quitting, talk to your doctor about stop-smoking programs and medicines. These can increase your chances of quitting for good. When should you call for help? Call 911 anytime you think you may need emergency care. This may mean having symptoms that suggest that your blood pressure is causing a serious heart or blood vessel problem. Your blood pressure may be over 180/110. For example, call 911 if: · You have symptoms of a heart attack. These may include: ¨ Chest pain or pressure, or a strange feeling in the chest. 
¨ Sweating. ¨ Shortness of breath. ¨ Nausea or vomiting. ¨ Pain, pressure, or a strange feeling in the back, neck, jaw, or upper belly or in one or both shoulders or arms. ¨ Lightheadedness or sudden weakness. ¨ A fast or irregular heartbeat. · You have symptoms of a stroke. These may include: 
¨ Sudden numbness, tingling, weakness, or loss of movement in your face, arm, or leg, especially on only one side of your body. ¨ Sudden vision changes. ¨ Sudden trouble speaking. ¨ Sudden confusion or trouble understanding simple statements. ¨ Sudden problems with walking or balance. ¨ A sudden, severe headache that is different from past headaches. · You have severe back or belly pain. Do not wait until your blood pressure comes down on its own. Get help right away. Call your doctor now or seek immediate care if: 
· Your blood pressure is much higher than normal (such as 180/110 or higher), but you don't have symptoms. · You think high blood pressure is causing symptoms, such as: ¨ Severe headache. ¨ Blurry vision. Watch closely for changes in your health, and be sure to contact your doctor if: 
· Your blood pressure measures 140/90 or higher at least 2 times. That means the top number is 140 or higher or the bottom number is 90 or higher, or both. · You think you may be having side effects from your blood pressure medicine. · Your blood pressure is usually normal, but it goes above normal at least 2 times. Where can you learn more? Go to http://jemma-javi.info/. Enter E645 in the search box to learn more about \"High Blood Pressure: Care Instructions. \" Current as of: August 8, 2016 Content Version: 11.2 © 3570-6579 [employer].  Care instructions adapted under license by [employer] (which disclaims liability or warranty for this information). If you have questions about a medical condition or this instruction, always ask your healthcare professional. [address] any warranty or liability for your use of this information. Low Sodium Diet (2,000 Milligram): Care Instructions Your Care Instructions Too much sodium causes your body to hold on to extra water. This can raise your blood pressure and force your heart and kidneys to work harder. In very serious cases, this could cause you to be put in the hospital. It might even be life-threatening. By limiting sodium, you will feel better and lower your risk of serious problems. The most common source of sodium is salt. People get most of the salt in their diet from canned, prepared, and packaged foods. Fast food and restaurant meals also are very high in sodium. Your doctor will probably limit your sodium to less than 2,000 milligrams (mg) a day. This limit counts all the sodium in prepared and packaged foods and any salt you add to your food. Follow-up care is a key part of your treatment and safety. Be sure to make and go to all appointments, and call your doctor if you are having problems. It's also a good idea to know your test results and keep a list of the medicines you take. How can you care for yourself at home? Read food labels · Read labels on cans and food packages. The labels tell you how much sodium is in each serving. Make sure that you look at the serving size. If you eat more than the serving size, you have eaten more sodium. · Food labels also tell you the Percent Daily Value for sodium. Choose products with low Percent Daily Values for sodium. · Be aware that sodium can come in forms other than salt, including monosodium glutamate (MSG), sodium citrate, and sodium bicarbonate (baking soda). MSG is often added to Asian food. When you eat out, you can sometimes ask for food without MSG or added salt. Buy low-sodium foods · Buy foods that are labeled \"unsalted\" (no salt added), \"sodium-free\" (less than 5 mg of sodium per serving), or \"low-sodium\" (less than 140 mg of sodium per serving). Foods labeled \"reduced-sodium\" and \"light sodium\" may still have too much sodium. Be sure to read the label to see how much sodium you are getting. · Buy fresh vegetables, or frozen vegetables without added sauces. Buy low-sodium versions of canned vegetables, soups, and other canned goods. Prepare low-sodium meals · Cut back on the amount of salt you use in cooking. This will help you adjust to the taste. Do not add salt after cooking. One teaspoon of salt has about 2,300 mg of sodium. · Take the salt shaker off the table. · Flavor your food with garlic, lemon juice, onion, vinegar, herbs, and spices. Do not use soy sauce, lite soy sauce, steak sauce, onion salt, garlic salt, celery salt, mustard, or ketchup on your food. · Use low-sodium salad dressings, sauces, and ketchup. Or make your own salad dressings and sauces without adding salt. · Use less salt (or none) when recipes call for it. You can often use half the salt a recipe calls for without losing flavor. Other foods such as rice, pasta, and grains do not need added salt. · Rinse canned vegetables, and cook them in fresh water. This removes somebut not allof the salt. · Avoid water that is naturally high in sodium or that has been treated with water softeners, which add sodium. Call your local water company to find out the sodium content of your water supply. If you buy bottled water, read the label and choose a sodium-free brand. Avoid high-sodium foods · Avoid eating: ¨ Smoked, cured, salted, and canned meat, fish, and poultry. ¨ Ham, pat, hot dogs, and luncheon meats. ¨ Regular, hard, and processed cheese and regular peanut butter. ¨ Crackers with salted tops, and other salted snack foods such as pretzels, chips, and salted popcorn. ¨ Frozen prepared meals, unless labeled low-sodium. ¨ Canned and dried soups, broths, and bouillon, unless labeled sodium-free or low-sodium. ¨ Canned vegetables, unless labeled sodium-free or low-sodium. ¨ [employer] fries, pizza, tacos, and other fast foods. ¨ Pickles, olives, ketchup, and other condiments, especially soy sauce, unless labeled sodium-free or low-sodium. Where can you learn more? Go to http://jemma-javi.info/. Enter D122 in the search box to learn more about \"Low Sodium Diet (2,000 Milligram): Care Instructions. \" Current as of: July 26, 2016 Content Version: 11.2 © 7333-0606 [employer]. Care instructions adapted under license by [employer] (which disclaims liability or warranty for this information). If you have questions about a medical condition or this instruction, always ask your healthcare professional. [place] [address] any warranty or liability for your use of this information. Introducing \[facility]\"" & HEALTH SERVICES! Shree Sifuentes introduces [facility] patient portal. Now you can access parts of your medical record, email your doctor's office, and request medication refills online. 1. In your internet browser, go to [email or web address] 2. Click on the First Time User? Click Here link in the Sign In box. You will see the New Member Sign Up page. 3. Enter your [facility] Access Code exactly as it appears below. You will not need to use this code after youve completed the sign-up process. If you do not sign up before the expiration date, you must request a new code. · [facility] Access Code: [email or web address] Expires: 8/14/2017  3:55 PM 
 
4. Enter the last four digits of your Social Security Number (xxxx) and Date of Birth (mm/dd/yyyy) as indicated and click Submit. You will be taken to the next sign-up page. 5. Create a [facility] ID.  This will be your [facility] login ID and cannot be changed, so think of one that is secure and easy to remember. 6. Create a [employer] [facility] password. You can change your password at any time. 7. Enter your Password Reset Question and Answer. This can be used at a later time if you forget your password. 8. Enter your e-mail address. You will receive e-mail notification when new information is available in [address] 9. Click Sign Up. You can now view and download portions of your medical record. 10. Click the Download Summary menu link to download a portable copy of your medical information. If you have questions, please visit the Frequently Asked Questions section of the [employer] [facility] website. Remember, [employer] [facility] is NOT to be used for urgent needs. For medical emergencies, dial 911. Now available from your iPhone and Android! Please provide this summary of care documentation to your next provider. If you have any questions after today's visit, please call [phone].

## 2017-05-17 NOTE — PROGRESS NOTES
Patient sent ED 5/16/2017 due to elevated BP during initial visit. Patient reports that he continues to have a headache at this time; Patient was prescribed medication at visit; BP elevated but stable at this time.

## 2017-05-23 PROBLEM — E78.5 HYPERLIPIDEMIA: Status: ACTIVE | Noted: 2017-05-23

## 2017-05-23 PROBLEM — I10 ESSENTIAL HYPERTENSION: Status: ACTIVE | Noted: 2017-05-23

## 2017-05-24 ENCOUNTER — OFFICE VISIT (OUTPATIENT)
Dept: FAMILY MEDICINE CLINIC | Age: 62
End: 2017-05-24

## 2017-05-24 VITALS
TEMPERATURE: 96.5 F | OXYGEN SATURATION: 100 % | RESPIRATION RATE: 16 BRPM | HEIGHT: 74 IN | SYSTOLIC BLOOD PRESSURE: 128 MMHG | WEIGHT: 198 LBS | HEART RATE: 58 BPM | BODY MASS INDEX: 25.41 KG/M2 | DIASTOLIC BLOOD PRESSURE: 68 MMHG

## 2017-05-24 DIAGNOSIS — E55.9 VITAMIN D DEFICIENCY: ICD-10-CM

## 2017-05-24 DIAGNOSIS — E78.2 MIXED HYPERLIPIDEMIA: ICD-10-CM

## 2017-05-24 DIAGNOSIS — I10 ESSENTIAL HYPERTENSION: Primary | ICD-10-CM

## 2017-05-24 DIAGNOSIS — Z11.59 ENCOUNTER FOR HEPATITIS C SCREENING TEST FOR LOW RISK PATIENT: ICD-10-CM

## 2017-05-24 DIAGNOSIS — F17.210 CIGARETTE NICOTINE DEPENDENCE WITHOUT COMPLICATION: ICD-10-CM

## 2017-05-24 RX ORDER — GUAIFENESIN 100 MG/5ML
81 LIQUID (ML) ORAL DAILY
COMMUNITY
Start: 2013-06-20 | End: 2019-08-26

## 2017-05-24 NOTE — PROGRESS NOTES
Chief Complaint   Patient presents with    Follow Up Chronic Condition     Hypertension         HPI:    This is a 61 y/o male patient who presents for follow up chronic conditions. HTN: BP good and significantly improved today. Doing well on current medication regimen. Tolerating well. No headache or dizziness     Hyperlipidemia: no recent labs. Compliant taking medication as prescribed. Fasting today- will draw labs. Confirm history of low vit D    Patient currently an every day smoker but he states she is cutting back down to about 1 pack a day and plans to continue cutting back. ROS: Pt denies: Wt loss, Fever/Chills, HA, Visual changes, Fatigue, Chest pain, SOB, ALMANZAR, Abd pain, N/V/D/C, Blood in stool or urine, Edema. Pertinent positive as above in HPI. All others were negative      Past Medical History:   Diagnosis Date    Hypercholesterolemia     Hypertension        No past surgical history on file. No family history on file. Social History     Social History    Marital status:      Spouse name: N/A    Number of children: N/A    Years of education: N/A     Occupational History    Not on file. Social History Main Topics    Smoking status: Current Every Day Smoker     Packs/day: 1.00     Types: Cigarettes    Smokeless tobacco: Not on file    Alcohol use Yes      Comment: OCCASSIONALLY    Drug use: No    Sexual activity: Yes     Partners: Female     Other Topics Concern    Not on file     Social History Narrative     Current Outpatient Prescriptions   Medication Sig Dispense Refill    Omega-3 Fatty Acids (FISH OIL) 500 mg cap Take  by mouth.  aspirin 81 mg chewable tablet Take 81 mg by mouth daily.  fenofibrate nanocrystallized (TRICOR) 48 mg tablet Take 1 Tab by mouth daily. 30 Tab 1    valsartan (DIOVAN) 320 mg tablet Take 1 Tab by mouth daily. 320 Tab 1    clopidogrel (PLAVIX) 75 mg tab Take 1 Tab by mouth daily.  30 Tab 1    nebivolol (BYSTOLIC) 5 mg tablet Take 1 Tab by mouth daily. 30 Tab 1    amLODIPine (NORVASC) 10 mg tablet Take  by mouth daily.  ascorbic acid, vitamin C, (VITAMIN C) 500 mg tablet Take  by mouth. No Known Allergies    Physical Exam:    Vital Signs:   Visit Vitals    /68    Pulse (!) 58    Temp 96.5 °F (35.8 °C) (Oral)    Resp 16    Ht 6' 2\" (1.88 m)    Wt 198 lb (89.8 kg)    SpO2 100%    BMI 25.42 kg/m2         General: a, a & o x 3, afebrile,  interacting appropriately, in no acute distress  HEENT: head normocephalic and atraumatic, conjuctiva clear  Skin: warm and dry, no rashes , no bruises, no skin lesions  Neck: supple, symmetrical, no palpable mass, no thyromegaly  Respiratory: lung sounds clear bilaterally, no wheezes or crackles  Cardiovascular: normal S1S2, regular rate and rhythm, no murmurs  Abdomen: non-distended, normal bowel sounds x 4 quadrants, soft, non-tender to palpation  Musculoskeletal: normal ROM on all joints, no swelling or deformity  Psychiatric: a, a & o x 3, appropriate mood and affect, no thought disorder    Assessment/Plan:          ICD-10-CM ICD-9-CM    1. Essential hypertension ( controlled)  I10 401.9 CBC WITH AUTOMATED DIFF      METABOLIC PANEL, COMPREHENSIVE    Low salt    2. Mixed hyperlipidemia E78.2 272.2 TSH 3RD GENERATION      LIPID PANEL      T4, FREE   3. Encounter for hepatitis C screening test for low risk patient Z11.59 V73.89 HEPATITIS C AB   4. Vitamin D deficiency E55.9 268.9 VITAMIN D, 25 HYDROXY   5. Cigarette nicotine dependence without complication P07.636 139.6 . The patient was counseled on the dangers of tobacco use, and was advised to quit. Reviewed strategies to maximize success, including removing cigarettes and smoking materials from environment, stress management and support of family/friends. Additional Notes: Discussed today's diagnosis, treatment plans. Discussed medication indications and side effects.     After Visit Summary: Provided and discussed printed patient instructions. Answered questions in relation to today's diagnosis.   Follow-up Disposition: return tomorrow for BP check          Angelita Russell NP-[facility]        Orders Placed This Encounter    HEPATITIS C AB    CBC WITH AUTOMATED DIFF    TSH 3RD GENERATION    LIPID PANEL    VITAMIN D, 25 HYDROXY    METABOLIC PANEL, COMPREHENSIVE    T4, FREE    Omega-3 Fatty Acids (FISH OIL) 500 mg cap    aspirin 81 mg chewable tablet

## 2017-05-24 NOTE — PROGRESS NOTES
1. Have you been to the ER, urgent care clinic since your last visit? Hospitalized since your last visit? No    2. Have you seen or consulted any other health care providers outside of the [address] [place] [address] since your last visit? Include any pap smears or colon screening.  No

## 2017-05-24 NOTE — PATIENT INSTRUCTIONS
DASH Diet: Care Instructions  Your Care Instructions  The DASH diet is an eating plan that can help lower your blood pressure. DASH stands for Dietary Approaches to Stop Hypertension. Hypertension is high blood pressure. The DASH diet focuses on eating foods that are high in calcium, potassium, and magnesium. These nutrients can lower blood pressure. The foods that are highest in these nutrients are fruits, vegetables, low-fat dairy products, nuts, seeds, and legumes. But taking calcium, potassium, and magnesium supplements instead of eating foods that are high in those nutrients does not have the same effect. The DASH diet also includes whole grains, fish, and poultry. The DASH diet is one of several lifestyle changes your doctor may recommend to lower your high blood pressure. Your doctor may also want you to decrease the amount of sodium in your diet. Lowering sodium while following the DASH diet can lower blood pressure even further than just the DASH diet alone. Follow-up care is a key part of your treatment and safety. Be sure to make and go to all appointments, and call your doctor if you are having problems. It's also a good idea to know your test results and keep a list of the medicines you take. How can you care for yourself at home? Following the DASH diet  · Eat 4 to 5 servings of fruit each day. A serving is 1 medium-sized piece of fruit, ½ cup chopped or canned fruit, 1/4 cup dried fruit, or 4 ounces (½ cup) of fruit juice. Choose fruit more often than fruit juice. · Eat 4 to 5 servings of vegetables each day. A serving is 1 cup of lettuce or raw leafy vegetables, ½ cup of chopped or cooked vegetables, or 4 ounces (½ cup) of vegetable juice. Choose vegetables more often than vegetable juice. · Get 2 to 3 servings of low-fat and fat-free dairy each day. A serving is 8 ounces of milk, 1 cup of yogurt, or 1 ½ ounces of cheese. · Eat 6 to 8 servings of grains each day.  A serving is 1 slice of bread, 1 ounce of dry cereal, or ½ cup of cooked rice, pasta, or cooked cereal. Try to choose whole-grain products as much as possible. · Limit lean meat, poultry, and fish to 2 servings each day. A serving is 3 ounces, about the size of a deck of cards. · Eat 4 to 5 servings of nuts, seeds, and legumes (cooked dried beans, lentils, and split peas) each week. A serving is 1/3 cup of nuts, 2 tablespoons of seeds, or ½ cup of cooked beans or peas. · Limit fats and oils to 2 to 3 servings each day. A serving is 1 teaspoon of vegetable oil or 2 tablespoons of salad dressing. · Limit sweets and added sugars to 5 servings or less a week. A serving is 1 tablespoon jelly or jam, ½ cup sorbet, or 1 cup of lemonade. · Eat less than 2,300 milligrams (mg) of sodium a day. If you limit your sodium to 1,500 mg a day, you can lower your blood pressure even more. Tips for success  · Start small. Do not try to make dramatic changes to your diet all at once. You might feel that you are missing out on your favorite foods and then be more likely to not follow the plan. Make small changes, and stick with them. Once those changes become habit, add a few more changes. · Try some of the following:  ¨ Make it a goal to eat a fruit or vegetable at every meal and at snacks. This will make it easy to get the recommended amount of fruits and vegetables each day. ¨ Try yogurt topped with fruit and nuts for a snack or healthy dessert. ¨ Add lettuce, tomato, cucumber, and onion to sandwiches. ¨ Combine a ready-made pizza crust with low-fat mozzarella cheese and lots of vegetable toppings. Try using tomatoes, squash, spinach, broccoli, carrots, cauliflower, and onions. ¨ Have a variety of cut-up vegetables with a low-fat dip as an appetizer instead of chips and dip. ¨ Sprinkle sunflower seeds or chopped almonds over salads. Or try adding chopped walnuts or almonds to cooked vegetables. ¨ Try some vegetarian meals using beans and peas. Add garbanzo or kidney beans to salads. Make burritos and tacos with mashed higgins beans or black beans. Where can you learn more? Go to http://jemma-javi.info/. Enter R275 in the search box to learn more about \"DASH Diet: Care Instructions. \"  Current as of: March 23, 2016  Content Version: 11.2  © 7479-9569 [employer]. Care instructions adapted under license by [employer] (which disclaims liability or warranty for this information). If you have questions about a medical condition or this instruction, always ask your healthcare professional. [place] [address] any warranty or liability for your use of this information. Heart-Healthy Diet: Care Instructions  Your Care Instructions    A heart-healthy diet has lots of vegetables, fruits, nuts, beans, and whole grains, and is low in salt. It limits foods that are high in saturated fat, such as meats, cheeses, and fried foods. It may be hard to change your diet, but even small changes can lower your risk of heart attack and heart disease. Follow-up care is a key part of your treatment and safety. Be sure to make and go to all appointments, and call your doctor if you are having problems. It's also a good idea to know your test results and keep a list of the medicines you take. How can you care for yourself at home? Watch your portions  · Learn what a serving is. A \"serving\" and a \"portion\" are not always the same thing. Make sure that you are not eating larger portions than are recommended. For example, a serving of pasta is ½ cup. A serving size of meat is 2 to 3 ounces. A 3-ounce serving is about the size of a deck of cards. Measure serving sizes until you are good at Bolt" them. Keep in mind that restaurants often serve portions that are 2 or 3 times the size of one serving. · To keep your energy level up and keep you from feeling hungry, eat often but in smaller portions.   · Eat only the number of calories you need to stay at a healthy weight. If you need to lose weight, eat fewer calories than your body burns (through exercise and other physical activity). Eat more fruits and vegetables  · Eat a variety of fruit and vegetables every day. Dark green, deep orange, red, or yellow fruits and vegetables are especially good for you. Examples include spinach, carrots, peaches, and berries. · Keep carrots, celery, and other veggies handy for snacks. Buy fruit that is in season and store it where you can see it so that you will be tempted to eat it. · Cook dishes that have a lot of veggies in them, such as stir-fries and soups. Limit saturated and trans fat  · Read food labels, and try to avoid saturated and trans fats. They increase your risk of heart disease. Trans fat is found in many processed foods such as cookies and crackers. · Use olive or canola oil when you cook. Try cholesterol-lowering spreads, such as Benecol or Take Control. · Bake, broil, grill, or steam foods instead of frying them. · Choose lean meats instead of high-fat meats such as hot dogs and sausages. Cut off all visible fat when you prepare meat. · Eat fish, skinless poultry, and meat alternatives such as soy products instead of high-fat meats. Soy products, such as tofu, may be especially good for your heart. · Choose low-fat or fat-free milk and dairy products. Eat fish  · Eat at least two servings of fish a week. Certain fish, such as salmon and tuna, contain omega-3 fatty acids, which may help reduce your risk of heart attack. Eat foods high in fiber  · Eat a variety of grain products every day. Include whole-grain foods that have lots of fiber and nutrients. Examples of whole-grain foods include oats, whole wheat bread, and brown rice. · Buy whole-grain breads and cereals, instead of white bread or pastries.   Limit salt and sodium  · Limit how much salt and sodium you eat to help lower your blood pressure. · Taste food before you salt it. Add only a little salt when you think you need it. With time, your taste buds will adjust to less salt. · Eat fewer snack items, fast foods, and other high-salt, processed foods. Check food labels for the amount of sodium in packaged foods. · Choose low-sodium versions of canned goods (such as soups, vegetables, and beans). Limit sugar  · Limit drinks and foods with added sugar. These include candy, desserts, and soda pop. Limit alcohol  · Limit alcohol to no more than 2 drinks a day for men and 1 drink a day for women. Too much alcohol can cause health problems. When should you call for help? Watch closely for changes in your health, and be sure to contact your doctor if:  · You would like help planning heart-healthy meals. Where can you learn more? Go to http://jemmaZipideejavi.info/. Enter V137 in the search box to learn more about \"Heart-Healthy Diet: Care Instructions. \"  Current as of: January 27, 2016  Content Version: 11.2  © 0818-2633 [employer]. Care instructions adapted under license by [employer] (which disclaims liability or warranty for this information). If you have questions about a medical condition or this instruction, always ask your healthcare professional. [address] any warranty or liability for your use of this information. Learning About Benefits From Quitting Smoking  How does quitting smoking make you healthier? If you're thinking about quitting smoking, you may have a few reasons to be smoke-free. Your health may be one of them. · When you quit smoking, you lower your risks for cancer, lung disease, heart attack, stroke, blood vessel disease, and blindness from macular degeneration. · When you're smoke-free, you get sick less often, and you heal faster. You are less likely to get colds, flu, bronchitis, and pneumonia.   · As a nonsmoker, you may find that your mood is better and you are less stressed. When and how will you feel healthier? Quitting has real health benefits that start from day 1 of being smoke-free. And the longer you stay smoke-free, the healthier you get and the better you feel. The first hours  · After just 20 minutes, your blood pressure and heart rate go down. That means there's less stress on your heart and blood vessels. · Within 12 hours, the level of carbon monoxide in your blood drops back to normal. That makes room for more oxygen. With more oxygen in your body, you may notice that you have more energy than when you smoked. After 2 weeks  · Your lungs start to work better. · Your risk of heart attack starts to drop. After 1 month  · When your lungs are clear, you cough less and breathe deeper, so it's easier to be active. · Your sense of taste and smell return. That means you can enjoy food more than you have since you started smoking. Over the years  · After 1 year, your risk of heart disease is half what it would be if you kept smoking. · After 5 years, your risk of stroke starts to shrink. Within a few years after that, it's about the same as if you'd never smoked. · After 10 years, your risk of dying from lung cancer is cut by about half. And your risk for many other types of cancer is lower too. How would quitting help others in your life? When you quit smoking, you improve the health of everyone who now breathes in your smoke. · Their heart, lung, and cancer risks drop, much like yours. · They are sick less. For babies and small children, living smoke-free means they're less likely to have ear infections, pneumonia, and bronchitis. · If you're a woman who is or will be pregnant someday, quitting smoking means a healthier . · Children who are close to you are less likely to become adult smokers. Where can you learn more? Go to http://jemma-javi.info/.   Enter [id number] [id number] [id number]  in the search box to learn more about \"Learning About Benefits From Quitting Smoking. \"  Current as of: May 26, 2016  Content Version: 11.2  © 2461-3949 [employer]. Care instructions adapted under license by [employer] (which disclaims liability or warranty for this information). If you have questions about a medical condition or this instruction, always ask your healthcare professional. [address] any warranty or liability for your use of this information. Stopping Smoking: Care Instructions  Your Care Instructions  Cigarette smokers crave the nicotine in cigarettes. Giving it up is much harder than simply changing a habit. Your body has to stop craving the nicotine. It is hard to quit, but you can do it. There are many tools that people use to quit smoking. You may find that combining tools works best for you. There are several steps to quitting. First you get ready to quit. Then you get support to help you. After that, you learn new skills and behaviors to become a nonsmoker. For many people, a necessary step is getting and using medicine. Your doctor will help you set up the plan that best meets your needs. You may want to attend a smoking cessation program to help you quit smoking. When you choose a program, look for one that has proven success. Ask your doctor for ideas. You will greatly increase your chances of success if you take medicine as well as get counseling or join a cessation program.  Some of the changes you feel when you first quit tobacco are uncomfortable. Your body will miss the nicotine at first, and you may feel short-tempered and grumpy. You may have trouble sleeping or concentrating. Medicine can help you deal with these symptoms. You may struggle with changing your smoking habits and rituals. The last step is the tricky one: Be prepared for the smoking urge to continue for a time. This is a lot to deal with, but keep at it. You will feel better.   Follow-up care is a key part of your treatment and safety. Be sure to make and go to all appointments, and call your doctor if you are having problems. Its also a good idea to know your test results and keep a list of the medicines you take. How can you care for yourself at home? · Ask your family, friends, and coworkers for support. You have a better chance of quitting if you have help and support. · Join a support group, such as Nicotine Anonymous, for people who are trying to quit smoking. · Consider signing up for a smoking cessation program, such as the American Lung Association's Freedom from Smoking program.  · Set a quit date. Pick your date carefully so that it is not right in the middle of a big deadline or stressful time. Once you quit, do not even take a puff. Get rid of all ashtrays and lighters after your last cigarette. Clean your house and your clothes so that they do not smell of smoke. · Learn how to be a nonsmoker. Think about ways you can avoid those things that make you reach for a cigarette. ¨ Avoid situations that put you at greatest risk for smoking. For some people, it is hard to have a drink with friends without smoking. For others, they might skip a coffee break with coworkers who smoke. ¨ Change your daily routine. Take a different route to work or eat a meal in a different place. · Cut down on stress. Calm yourself or release tension by doing an activity you enjoy, such as reading a book, taking a hot bath, or gardening. · Talk to your doctor or pharmacist about nicotine replacement therapy, which replaces the nicotine in your body. You still get nicotine but you do not use tobacco. Nicotine replacement products help you slowly reduce the amount of nicotine you need.  These products come in several forms, many of them available over-the-counter:  ¨ Nicotine patches  ¨ Nicotine gum and lozenges  ¨ Nicotine inhaler  · Ask your doctor about bupropion (Wellbutrin) or varenicline (Chantix), which are prescription medicines. They do not contain nicotine. They help you by reducing withdrawal symptoms, such as stress and anxiety. · Some people find hypnosis, acupuncture, and massage helpful for ending the smoking habit. · Eat a healthy diet and get regular exercise. Having healthy habits will help your body move past its craving for nicotine. · Be prepared to keep trying. Most people are not successful the first few times they try to quit. Do not get mad at yourself if you smoke again. Make a list of things you learned and think about when you want to try again, such as next week, next month, or next year. Where can you learn more? Go to http://jemmaAccendo Technologiesjavi.info/. Enter W208 in the search box to learn more about \"Stopping Smoking: Care Instructions. \"  Current as of: May 26, 2016  Content Version: 11.2  © 2876-6094 [employer]. Care instructions adapted under license by [employer] (which disclaims liability or warranty for this information). If you have questions about a medical condition or this instruction, always ask your healthcare professional. [address] any warranty or liability for your use of this information.

## 2017-05-24 NOTE — MR AVS SNAPSHOT
Visit Information Date & Time Provider Department Dept. Phone Encounter #  
 5/24/2017  3:00 PM Anabell Toscano NP [address] [facility] [phone] [id number] Follow-up Instructions Return in about 1 month (around 6/24/2017) for call 1800- Quit - now for smoking cessation support. Upcoming Health Maintenance Date Due Hepatitis C Screening 1955 Pneumococcal 19-64 Medium Risk (1 of 1 - PPSV23) 1/12/1973 DTaP/Tdap/Td series (1 - Tdap) 1/12/1975 ZOSTER VACCINE AGE 60> 1/12/2014 INFLUENZA AGE 9 TO ADULT 8/1/2017 COLONOSCOPY 5/30/2018 Allergies as of 5/24/2017  Review Complete On: 5/17/2017 By: Anabell Toscano NP No Known Allergies Current Immunizations  Never Reviewed No immunizations on file. Not reviewed this visit You Were Diagnosed With   
  
 Codes Comments Essential hypertension    -  Primary ICD-10-CM: I10 
ICD-9-CM: 401.9 Mixed hyperlipidemia     ICD-10-CM: E78.2 ICD-9-CM: 272.2 Encounter for hepatitis C screening test for low risk patient     ICD-10-CM: Z11.59 
ICD-9-CM: V73.89 Vitamin D deficiency     ICD-10-CM: E55.9 ICD-9-CM: 268.9 Vitals BP Pulse Temp Resp Height(growth percentile) Weight(growth percentile) 128/68 (!) 58 96.5 °F (35.8 °C) (Oral) 16 6' 2\" (1.88 m) 198 lb (89.8 kg) SpO2 BMI Smoking Status 100% 25.42 kg/m2 Current Every Day Smoker BMI and BSA Data Body Mass Index Body Surface Area  
 25.42 kg/m 2 2.17 m 2 Preferred Pharmacy Pharmacy Name Phone [id number] [address], [place] - [address] [place] [address] [phone] Your Updated Medication List  
  
   
This list is accurate as of: 5/24/17  3:21 PM.  Always use your most recent med list. amLODIPine 10 mg tablet Commonly known as:  [employer] Take  by mouth daily. aspirin 81 mg chewable tablet Take 81 mg by mouth daily. clopidogrel 75 mg Tab Commonly known as:  PLAVIX Take 1 Tab by mouth daily. fenofibrate nanocrystallized 48 mg tablet Commonly known as:  [employer] Take 1 Tab by mouth daily. FISH  mg Cap Generic drug:  Omega-3 Fatty Acids Take  by mouth. nebivolol 5 mg tablet Commonly known as:  BYSTOLIC Take 1 Tab by mouth daily. valsartan 320 mg tablet Commonly known as:  DIOVAN Take 1 Tab by mouth daily. VITAMIN C 500 mg tablet Generic drug:  ascorbic acid (vitamin C) Take  by mouth. Follow-up Instructions Return in about 1 month (around 6/24/2017) for call 1800- Quit - now for smoking cessation support. Patient Instructions DASH Diet: Care Instructions Your Care Instructions The DASH diet is an eating plan that can help lower your blood pressure. DASH stands for Dietary Approaches to Stop Hypertension. Hypertension is high blood pressure. The DASH diet focuses on eating foods that are high in calcium, potassium, and magnesium. These nutrients can lower blood pressure. The foods that are highest in these nutrients are fruits, vegetables, low-fat dairy products, nuts, seeds, and legumes. But taking calcium, potassium, and magnesium supplements instead of eating foods that are high in those nutrients does not have the same effect. The DASH diet also includes whole grains, fish, and poultry. The DASH diet is one of several lifestyle changes your doctor may recommend to lower your high blood pressure. Your doctor may also want you to decrease the amount of sodium in your diet. Lowering sodium while following the DASH diet can lower blood pressure even further than just the DASH diet alone. Follow-up care is a key part of your treatment and safety. Be sure to make and go to all appointments, and call your doctor if you are having problems. It's also a good idea to know your test results and keep a list of the medicines you take. How can you care for yourself at home? Following the DASH diet · Eat 4 to 5 servings of fruit each day. A serving is 1 medium-sized piece of fruit, ½ cup chopped or canned fruit, 1/4 cup dried fruit, or 4 ounces (½ cup) of fruit juice. Choose fruit more often than fruit juice. · Eat 4 to 5 servings of vegetables each day. A serving is 1 cup of lettuce or raw leafy vegetables, ½ cup of chopped or cooked vegetables, or 4 ounces (½ cup) of vegetable juice. Choose vegetables more often than vegetable juice. · Get 2 to 3 servings of low-fat and fat-free dairy each day. A serving is 8 ounces of milk, 1 cup of yogurt, or 1 ½ ounces of cheese. · Eat 6 to 8 servings of grains each day. A serving is 1 slice of bread, 1 ounce of dry cereal, or ½ cup of cooked rice, pasta, or cooked cereal. Try to choose whole-grain products as much as possible. · Limit lean meat, poultry, and fish to 2 servings each day. A serving is 3 ounces, about the size of a deck of cards. · Eat 4 to 5 servings of nuts, seeds, and legumes (cooked dried beans, lentils, and split peas) each week. A serving is 1/3 cup of nuts, 2 tablespoons of seeds, or ½ cup of cooked beans or peas. · Limit fats and oils to 2 to 3 servings each day. A serving is 1 teaspoon of vegetable oil or 2 tablespoons of salad dressing. · Limit sweets and added sugars to 5 servings or less a week. A serving is 1 tablespoon jelly or jam, ½ cup sorbet, or 1 cup of lemonade. · Eat less than 2,300 milligrams (mg) of sodium a day. If you limit your sodium to 1,500 mg a day, you can lower your blood pressure even more. Tips for success · Start small. Do not try to make dramatic changes to your diet all at once. You might feel that you are missing out on your favorite foods and then be more likely to not follow the plan. Make small changes, and stick with them. Once those changes become habit, add a few more changes. · Try some of the following: ¨ Make it a goal to eat a fruit or vegetable at every meal and at snacks. This will make it easy to get the recommended amount of fruits and vegetables each day. ¨ Try yogurt topped with fruit and nuts for a snack or healthy dessert. ¨ Add lettuce, tomato, cucumber, and onion to sandwiches. ¨ Combine a ready-made pizza crust with low-fat mozzarella cheese and lots of vegetable toppings. Try using tomatoes, squash, spinach, broccoli, carrots, cauliflower, and onions. ¨ Have a variety of cut-up vegetables with a low-fat dip as an appetizer instead of chips and dip. ¨ Sprinkle sunflower seeds or chopped almonds over salads. Or try adding chopped walnuts or almonds to cooked vegetables. ¨ Try some vegetarian meals using beans and peas. Add garbanzo or kidney beans to salads. Make burritos and tacos with mashed higgins beans or black beans. Where can you learn more? Go to http://jemmaFiberLightjavi.info/. Enter O782 in the search box to learn more about \"DASH Diet: Care Instructions. \" Current as of: March 23, 2016 Content Version: 11.2 © 2518-0694 [employer], [employer]. Care instructions adapted under license by [employer] (which disclaims liability or warranty for this information). If you have questions about a medical condition or this instruction, always ask your healthcare professional. [place] [address] any warranty or liability for your use of this information. Heart-Healthy Diet: Care Instructions Your Care Instructions A heart-healthy diet has lots of vegetables, fruits, nuts, beans, and whole grains, and is low in salt. It limits foods that are high in saturated fat, such as meats, cheeses, and fried foods. It may be hard to change your diet, but even small changes can lower your risk of heart attack and heart disease. Follow-up care is a key part of your treatment and safety.  Be sure to make and go to all appointments, and call your doctor if you are having problems. It's also a good idea to know your test results and keep a list of the medicines you take. How can you care for yourself at home? Watch your portions · Learn what a serving is. A \"serving\" and a \"portion\" are not always the same thing. Make sure that you are not eating larger portions than are recommended. For example, a serving of pasta is ½ cup. A serving size of meat is 2 to 3 ounces. A 3-ounce serving is about the size of a deck of cards. Measure serving sizes until you are good at [place]" them. Keep in mind that restaurants often serve portions that are 2 or 3 times the size of one serving. · To keep your energy level up and keep you from feeling hungry, eat often but in smaller portions. · Eat only the number of calories you need to stay at a healthy weight. If you need to lose weight, eat fewer calories than your body burns (through exercise and other physical activity). Eat more fruits and vegetables · Eat a variety of fruit and vegetables every day. Dark green, deep orange, red, or yellow fruits and vegetables are especially good for you. Examples include spinach, carrots, peaches, and berries. · Keep carrots, celery, and other veggies handy for snacks. Buy fruit that is in season and store it where you can see it so that you will be tempted to eat it. · Cook dishes that have a lot of veggies in them, such as stir-fries and soups. Limit saturated and trans fat · Read food labels, and try to avoid saturated and trans fats. They increase your risk of heart disease. Trans fat is found in many processed foods such as cookies and crackers. · Use olive or canola oil when you cook. Try cholesterol-lowering spreads, such as Benecol or Take Control. · Bake, broil, grill, or steam foods instead of frying them.  
· Choose lean meats instead of high-fat meats such as hot dogs and sausages. Cut off all visible fat when you prepare meat. · Eat fish, skinless poultry, and meat alternatives such as soy products instead of high-fat meats. Soy products, such as tofu, may be especially good for your heart. · Choose low-fat or fat-free milk and dairy products. Eat fish · Eat at least two servings of fish a week. Certain fish, such as salmon and tuna, contain omega-3 fatty acids, which may help reduce your risk of heart attack. Eat foods high in fiber · Eat a variety of grain products every day. Include whole-grain foods that have lots of fiber and nutrients. Examples of whole-grain foods include oats, whole wheat bread, and brown rice. · Buy whole-grain breads and cereals, instead of white bread or pastries. Limit salt and sodium · Limit how much salt and sodium you eat to help lower your blood pressure. · Taste food before you salt it. Add only a little salt when you think you need it. With time, your taste buds will adjust to less salt. · Eat fewer snack items, fast foods, and other high-salt, processed foods. Check food labels for the amount of sodium in packaged foods. · Choose low-sodium versions of canned goods (such as soups, vegetables, and beans). Limit sugar · Limit drinks and foods with added sugar. These include candy, desserts, and soda pop. Limit alcohol · Limit alcohol to no more than 2 drinks a day for men and 1 drink a day for women. Too much alcohol can cause health problems. When should you call for help? Watch closely for changes in your health, and be sure to contact your doctor if: 
· You would like help planning heart-healthy meals. Where can you learn more? Go to http://jemma-javi.info/. Enter V137 in the search box to learn more about \"Heart-Healthy Diet: Care Instructions. \" Current as of: January 27, 2016 Content Version: 11.2 © 4131-4793 [employer], [employer]Protect.  Care instructions adapted under license by [address] (which disclaims liability or warranty for this information). If you have questions about a medical condition or this instruction, always ask your healthcare professional. [address] any warranty or liability for your use of this information. Learning About Benefits From Quitting Smoking How does quitting smoking make you healthier? If you're thinking about quitting smoking, you may have a few reasons to be smoke-free. Your health may be one of them. · When you quit smoking, you lower your risks for cancer, lung disease, heart attack, stroke, blood vessel disease, and blindness from macular degeneration. · When you're smoke-free, you get sick less often, and you heal faster. You are less likely to get colds, flu, bronchitis, and pneumonia. · As a nonsmoker, you may find that your mood is better and you are less stressed. When and how will you feel healthier? Quitting has real health benefits that start from day 1 of being smoke-free. And the longer you stay smoke-free, the healthier you get and the better you feel. The first hours · After just 20 minutes, your blood pressure and heart rate go down. That means there's less stress on your heart and blood vessels. · Within 12 hours, the level of carbon monoxide in your blood drops back to normal. That makes room for more oxygen. With more oxygen in your body, you may notice that you have more energy than when you smoked. After 2 weeks · Your lungs start to work better. · Your risk of heart attack starts to drop. After 1 month · When your lungs are clear, you cough less and breathe deeper, so it's easier to be active. · Your sense of taste and smell return. That means you can enjoy food more than you have since you started smoking. Over the years · After 1 year, your risk of heart disease is half what it would be if you kept smoking. · After 5 years, your risk of stroke starts to shrink. Within a few years after that, it's about the same as if you'd never smoked. · After 10 years, your risk of dying from lung cancer is cut by about half. And your risk for many other types of cancer is lower too. How would quitting help others in your life? When you quit smoking, you improve the health of everyone who now breathes in your smoke. · Their heart, lung, and cancer risks drop, much like yours. · They are sick less. For babies and small children, living smoke-free means they're less likely to have ear infections, pneumonia, and bronchitis. · If you're a woman who is or will be pregnant someday, quitting smoking means a healthier . · Children who are close to you are less likely to become adult smokers. Where can you learn more? Go to http://jemmafor[MD]javi.info/. Enter [id number] [id number] [id number] 11 in the search box to learn more about \"Learning About Benefits From Quitting Smoking. \" Current as of: May 26, 2016 Content Version: 11.2 © 4349-1435 [employer]. Care instructions adapted under license by [employer] (which disclaims liability or warranty for this information). If you have questions about a medical condition or this instruction, always ask your healthcare professional. [place] [address] any warranty or liability for your use of this information. Stopping Smoking: Care Instructions Your Care Instructions Cigarette smokers crave the nicotine in cigarettes. Giving it up is much harder than simply changing a habit. Your body has to stop craving the nicotine. It is hard to quit, but you can do it. There are many tools that people use to quit smoking. You may find that combining tools works best for you. There are several steps to quitting. First you get ready to quit. Then you get support to help you.  After that, you learn new skills and behaviors to become a nonsmoker. For many people, a necessary step is getting and using medicine. Your doctor will help you set up the plan that best meets your needs. You may want to attend a smoking cessation program to help you quit smoking. When you choose a program, look for one that has proven success. Ask your doctor for ideas. You will greatly increase your chances of success if you take medicine as well as get counseling or join a cessation program. 
Some of the changes you feel when you first quit tobacco are uncomfortable. Your body will miss the nicotine at first, and you may feel short-tempered and grumpy. You may have trouble sleeping or concentrating. Medicine can help you deal with these symptoms. You may struggle with changing your smoking habits and rituals. The last step is the tricky one: Be prepared for the smoking urge to continue for a time. This is a lot to deal with, but keep at it. You will feel better. Follow-up care is a key part of your treatment and safety. Be sure to make and go to all appointments, and call your doctor if you are having problems. Its also a good idea to know your test results and keep a list of the medicines you take. How can you care for yourself at home? · Ask your family, friends, and coworkers for support. You have a better chance of quitting if you have help and support. · Join a support group, such as Nicotine Anonymous, for people who are trying to quit smoking. · Consider signing up for a smoking cessation program, such as the American Lung Association's Freedom from Smoking program. 
· Set a quit date. Pick your date carefully so that it is not right in the middle of a big deadline or stressful time. Once you quit, do not even take a puff. Get rid of all ashtrays and lighters after your last cigarette. Clean your house and your clothes so that they do not smell of smoke. · Learn how to be a nonsmoker.  Think about ways you can avoid those things that make you reach for a cigarette. ¨ Avoid situations that put you at greatest risk for smoking. For some people, it is hard to have a drink with friends without smoking. For others, they might skip a coffee break with coworkers who smoke. ¨ Change your daily routine. Take a different route to work or eat a meal in a different place. · Cut down on stress. Calm yourself or release tension by doing an activity you enjoy, such as reading a book, taking a hot bath, or gardening. · Talk to your doctor or pharmacist about nicotine replacement therapy, which replaces the nicotine in your body. You still get nicotine but you do not use tobacco. Nicotine replacement products help you slowly reduce the amount of nicotine you need. These products come in several forms, many of them available over-the-counter: ¨ Nicotine patches ¨ Nicotine gum and lozenges ¨ Nicotine inhaler · Ask your doctor about bupropion (Wellbutrin) or varenicline (Chantix), which are prescription medicines. They do not contain nicotine. They help you by reducing withdrawal symptoms, such as stress and anxiety. · Some people find hypnosis, acupuncture, and massage helpful for ending the smoking habit. · Eat a healthy diet and get regular exercise. Having healthy habits will help your body move past its craving for nicotine. · Be prepared to keep trying. Most people are not successful the first few times they try to quit. Do not get mad at yourself if you smoke again. Make a list of things you learned and think about when you want to try again, such as next week, next month, or next year. Where can you learn more? Go to http://jemma-javi.info/. Enter U806 in the search box to learn more about \"Stopping Smoking: Care Instructions. \" Current as of: May 26, 2016 Content Version: 11.2 © 5246-4728 [employer], [employer].  Care instructions adapted under license by [address] (which disclaims liability or warranty for this information). If you have questions about a medical condition or this instruction, always ask your healthcare professional. [address] any warranty or liability for your use of this information. Introducing hospitals & HEALTH SERVICES! [employer] introduces [email or web address] patient portal. Now you can access parts of your medical record, email your doctor's office, and request medication refills online. 1. In your internet browser, go to [email or web address] The [email or web address] 2. Click on the First Time User? Click Here link in the Sign In box. You will see the New Member Sign Up page. 3. Enter your [email or web address] Access Code exactly as it appears below. You will not need to use this code after youve completed the sign-up process. If you do not sign up before the expiration date, you must request a new code. · [email or web address] Access Code: [email or web address] Expires: 8/14/2017  3:55 PM 
 
4. Enter the last four digits of your Social Security Number (xxxx) and Date of Birth (mm/dd/yyyy) as indicated and click Submit. You will be taken to the next sign-up page. 5. Create a [email or web address].me ID. This will be your [email or web address] login ID and cannot be changed, so think of one that is secure and easy to remember. 6. Create a [email or web address].me password. You can change your password at any time. 7. Enter your Password Reset Question and Answer. This can be used at a later time if you forget your password. 8. Enter your e-mail address. You will receive e-mail notification when new information is available in [address]. 9. Click Sign Up. You can now view and download portions of your medical record. 10. Click the Download Summary menu link to download a portable copy of your medical information. If you have questions, please visit the Frequently Asked Questions section of the [email or web address] website.  Remember, [email or web address] is NOT to be used for urgent needs. For medical emergencies, dial 911. Now available from your iPhone and Android! Please provide this summary of care documentation to your next provider. If you have any questions after today's visit, please call [phone].

## 2017-05-31 ENCOUNTER — OFFICE VISIT (OUTPATIENT)
Dept: FAMILY MEDICINE CLINIC | Age: 62
End: 2017-05-31

## 2017-05-31 VITALS
HEART RATE: 73 BPM | WEIGHT: 195.8 LBS | BODY MASS INDEX: 25.13 KG/M2 | RESPIRATION RATE: 16 BRPM | TEMPERATURE: 98 F | OXYGEN SATURATION: 98 % | DIASTOLIC BLOOD PRESSURE: 56 MMHG | SYSTOLIC BLOOD PRESSURE: 92 MMHG | HEIGHT: 74 IN

## 2017-05-31 DIAGNOSIS — I10 ESSENTIAL HYPERTENSION: ICD-10-CM

## 2017-05-31 DIAGNOSIS — J02.9 SORE THROAT: ICD-10-CM

## 2017-05-31 DIAGNOSIS — J00 ACUTE NASOPHARYNGITIS: Primary | ICD-10-CM

## 2017-05-31 RX ORDER — AMLODIPINE BESYLATE 5 MG/1
5 TABLET ORAL DAILY
Qty: 30 TAB | Refills: 0 | Status: SHIPPED | OUTPATIENT
Start: 2017-05-31 | End: 2017-06-29 | Stop reason: SDUPTHER

## 2017-05-31 RX ORDER — CETIRIZINE HCL 10 MG
10 TABLET ORAL DAILY
Qty: 30 TAB | Refills: 0 | Status: SHIPPED | OUTPATIENT
Start: 2017-05-31 | End: 2017-11-20 | Stop reason: SDUPTHER

## 2017-05-31 RX ORDER — FLUTICASONE PROPIONATE 50 MCG
SPRAY, SUSPENSION (ML) NASAL
Qty: 1 BOTTLE | Refills: 0 | Status: SHIPPED | OUTPATIENT
Start: 2017-05-31 | End: 2017-06-02 | Stop reason: SDUPTHER

## 2017-05-31 NOTE — PROGRESS NOTES
Chief Complaint   Patient presents with    Sore Throat       HPI:    This is a 61 y/o  patient who presents with the above symptoms. Sore throat with a subjective fever for 3 days. + mild non productive cough  Complain of fatigue and feeling very tired. BP slight low today- 92/56  Manual repeat by me -95/58    Rapid strep - negative      ROS: pertinent positives as noted in HPI. All others were negative      Past Medical History:   Diagnosis Date    Hypercholesterolemia     Hypertension      Social History     Social History    Marital status:      Spouse name: N/A    Number of children: N/A    Years of education: N/A     Occupational History    Not on file. Social History Main Topics    Smoking status: Current Every Day Smoker     Packs/day: 1.00     Types: Cigarettes    Smokeless tobacco: Not on file    Alcohol use Yes      Comment: OCCASSIONALLY    Drug use: No    Sexual activity: Yes     Partners: Female     Other Topics Concern    Not on file     Social History Narrative     Current Outpatient Prescriptions   Medication Sig Dispense Refill    Omega-3 Fatty Acids (FISH OIL) 500 mg cap Take  by mouth.  aspirin 81 mg chewable tablet Take 81 mg by mouth daily.  fenofibrate nanocrystallized (TRICOR) 48 mg tablet Take 1 Tab by mouth daily. 30 Tab 1    valsartan (DIOVAN) 320 mg tablet Take 1 Tab by mouth daily. 320 Tab 1    clopidogrel (PLAVIX) 75 mg tab Take 1 Tab by mouth daily. 30 Tab 1    nebivolol (BYSTOLIC) 5 mg tablet Take 1 Tab by mouth daily. 30 Tab 1    amLODIPine (NORVASC) 10 mg tablet Take  by mouth daily.  ascorbic acid, vitamin C, (VITAMIN C) 500 mg tablet Take  by mouth.          No Known Allergies      Physical Exam:    Vital Signs:   Visit Vitals    BP 92/56    Pulse 73    Temp 98 °F (36.7 °C) (Oral)    Resp 16    Ht 6' 2\" (1.88 m)    Wt 195 lb 12.8 oz (88.8 kg)    SpO2 98%    BMI 25.14 kg/m2         General: a, a & o x 3, afebrile, interacting appropriately, in no acute distress  HEENT: head NC/AT, conjuctiva clear, no erythema or swelling, pharynx and tonsils with no erythema or exudates, no sinus tenderness  Respiratory: lung sounds clear bilaterally,  no wheezes or crackles  Cardiovascular: normal S1S2, regular rate and rhythm, no murmurs      Assessment/Plan:      ICD-10-CM ICD-9-CM    1. Acute nasopharyngitis J00 460 fluticasone (FLONASE) 50 mcg/actuation nasal spray      cetirizine (ZYRTEC) 10 mg tablet    Plenty of fluid and rest    Work note given to return to work on Monday   2. Sore throat J02.9 462 AMB POC RAPID STREP A     3. . Essential hypertension I10 401.9 amLODIPine (NORVASC) 5 mg tablet  Decreased Amlodipine to 5 mg discontinue 10 mg. Check BP daily and call our office if consistently lower than 100/60         Additional Notes: Discussed today's diagnosis, treatment plans. Discussed medication indications and side effects. After Visit Summary: Provided and discussed printed patient instructions. Answered questions in relation to today's diagnosis. Follow-up Disposition: Follow as needed.  Keep previously scheduled appointment for HTN          Lynda Kawasaki, NP-BC  Family Medicine  [facility]          Orders Placed This Encounter    amLODIPine (NORVASC) 5 mg tablet    fluticasone (FLONASE) 50 mcg/actuation nasal spray    cetirizine (ZYRTEC) 10 mg tablet

## 2017-05-31 NOTE — PROGRESS NOTES
1. Have you been to the ER, urgent care clinic since your last visit? Hospitalized since your last visit? No    2. Have you seen or consulted any other health care providers outside of the [address] [place] [address] since your last visit? Include any pap smears or colon screening.  No

## 2017-05-31 NOTE — MR AVS SNAPSHOT
Visit Information Date & Time Provider Department Dept. Phone Encounter #  
 5/31/2017 11:45 AM Gary Villanueva Surinder Moises [id number] Follow-up Instructions Return if symptoms worsen or fail to improve. Your Appointments 8/25/2017  2:30 PM  
Follow Up with LIZETT Naranjo ([facility]) Appt Note: 3 mo f/u  
 Jonathanuth Kedar. [address]  
  
   
 [address] [facility] Upcoming Health Maintenance Date Due Hepatitis C Screening 1955 Pneumococcal 19-64 Medium Risk (1 of 1 - PPSV23) 1/12/1973 DTaP/Tdap/Td series (1 - Tdap) 1/12/1975 ZOSTER VACCINE AGE 60> 1/12/2014 INFLUENZA AGE 9 TO ADULT 8/1/2017 COLONOSCOPY 5/30/2018 Allergies as of 5/31/2017  Review Complete On: 5/17/2017 By: Gary Villanueva NP No Known Allergies Current Immunizations  Never Reviewed No immunizations on file. Not reviewed this visit You Were Diagnosed With   
  
 Codes Comments Acute nasopharyngitis    -  Primary ICD-10-CM: Bloomer Maclachlan ICD-9-CM: 709 Essential hypertension     ICD-10-CM: I10 
ICD-9-CM: 401.9 Vitals BP Pulse Temp Resp Height(growth percentile) Weight(growth percentile) 92/56 73 98 °F (36.7 °C) (Oral) 16 6' 2\" (1.88 m) 195 lb 12.8 oz (88.8 kg) SpO2 BMI Smoking Status 98% 25.14 kg/m2 Current Every Day Smoker BMI and BSA Data Body Mass Index Body Surface Area  
 25.14 kg/m 2 2.15 m 2 Preferred Pharmacy Pharmacy Name Phone [id number], [address] [phone] Your Updated Medication List  
  
   
This list is accurate as of: 5/31/17 12:40 PM.  Always use your most recent med list. amLODIPine 5 mg tablet Commonly known as:  Barabara Puls Take 1 Tab by mouth daily. aspirin 81 mg chewable tablet Take 81 mg by mouth daily. cetirizine 10 mg tablet Commonly known as:  ZYRTEC Take 1 Tab by mouth daily. clopidogrel 75 mg Tab Commonly known as:  PLAVIX Take 1 Tab by mouth daily. fenofibrate nanocrystallized 48 mg tablet Commonly known as:  [employer] Take 1 Tab by mouth daily. FISH  mg Cap Generic drug:  Omega-3 Fatty Acids Take  by mouth. fluticasone 50 mcg/actuation nasal spray Commonly known as:  FLONASE  
1 spray into each nostril daily  
  
 nebivolol 5 mg tablet Commonly known as:  BYSTOLIC Take 1 Tab by mouth daily. valsartan 320 mg tablet Commonly known as:  DIOVAN Take 1 Tab by mouth daily. VITAMIN C 500 mg tablet Generic drug:  ascorbic acid (vitamin C) Take  by mouth. Prescriptions Sent to Pharmacy Refills  
 amLODIPine (NORVASC) 5 mg tablet 0 Sig: Take 1 Tab by mouth daily. Class: Normal  
 Pharmacy: [facility] [address], [address] Ph #: [phone] Route: Oral  
 fluticasone (FLONASE) 50 mcg/actuation nasal spray 0 Si spray into each nostril daily Class: Normal  
 Pharmacy: [facility] [address], [place] [facility]  [facility] Ph #: [phone]  
 cetirizine (ZYRTEC) 10 mg tablet 0 Sig: Take 1 Tab by mouth daily. Class: Normal  
 Pharmacy: [facility] [address], [address] Ph #: [phone] Route: Oral  
  
Follow-up Instructions Return if symptoms worsen or fail to improve. Introducing [facility]! Joan Toth introduces Pressflip patient portal. Now you can access parts of your medical record, email your doctor's office, and request medication refills online. 1. In your internet browser, go to [email or web address] 2. Click on the First Time User? Click Here link in the Sign In box. You will see the New Member Sign Up page. 3. Enter your Taggstr Access Code exactly as it appears below. You will not need to use this code after youve completed the sign-up process. If you do not sign up before the expiration date, you must request a new code. · Taggstr Access Code: [email or web address] Expires: 8/14/2017  3:55 PM 
 
4. Enter the last four digits of your Social Security Number (xxxx) and Date of Birth (mm/dd/yyyy) as indicated and click Submit. You will be taken to the next sign-up page. 5. Create a Taggstr ID. This will be your Taggstr login ID and cannot be changed, so think of one that is secure and easy to remember. 6. Create a Taggstr password. You can change your password at any time. 7. Enter your Password Reset Question and Answer. This can be used at a later time if you forget your password. 8. Enter your e-mail address. You will receive e-mail notification when new information is available in [address] 9. Click Sign Up. You can now view and download portions of your medical record. 10. Click the Download Summary menu link to download a portable copy of your medical information. If you have questions, please visit the Frequently Asked Questions section of the Taggstr website. Remember, Taggstr is NOT to be used for urgent needs. For medical emergencies, dial 911. Now available from your iPhone and Android! Please provide this summary of care documentation to your next provider. If you have any questions after today's visit, please call [phone].

## 2017-06-01 DIAGNOSIS — E55.9 VITAMIN D DEFICIENCY: ICD-10-CM

## 2017-06-01 DIAGNOSIS — E78.2 MIXED HYPERLIPIDEMIA: Primary | ICD-10-CM

## 2017-06-01 LAB
25(OH)D3+25(OH)D2 SERPL-MCNC: 21.4 NG/ML (ref 30–100)
ALBUMIN SERPL-MCNC: 4 G/DL (ref 3.6–4.8)
ALBUMIN/GLOB SERPL: 1.6 {RATIO} (ref 1.2–2.2)
ALP SERPL-CCNC: 77 IU/L (ref 39–117)
ALT SERPL-CCNC: 10 IU/L (ref 0–44)
AST SERPL-CCNC: 13 IU/L (ref 0–40)
BASOPHILS # BLD AUTO: 0.1 X10E3/UL (ref 0–0.2)
BASOPHILS NFR BLD AUTO: 1 %
BILIRUB SERPL-MCNC: 0.6 MG/DL (ref 0–1.2)
BUN SERPL-MCNC: 28 MG/DL (ref 8–27)
BUN/CREAT SERPL: 20 (ref 10–24)
CALCIUM SERPL-MCNC: 9.5 MG/DL (ref 8.6–10.2)
CHLORIDE SERPL-SCNC: 101 MMOL/L (ref 96–106)
CHOLEST SERPL-MCNC: 225 MG/DL (ref 100–199)
CO2 SERPL-SCNC: 23 MMOL/L (ref 18–29)
CREAT SERPL-MCNC: 1.38 MG/DL (ref 0.76–1.27)
EOSINOPHIL # BLD AUTO: 0.3 X10E3/UL (ref 0–0.4)
EOSINOPHIL NFR BLD AUTO: 3 %
ERYTHROCYTE [DISTWIDTH] IN BLOOD BY AUTOMATED COUNT: 14.6 % (ref 12.3–15.4)
GLOBULIN SER CALC-MCNC: 2.5 G/DL (ref 1.5–4.5)
GLUCOSE SERPL-MCNC: 79 MG/DL (ref 65–99)
HCT VFR BLD AUTO: 44 % (ref 37.5–51)
HCV AB S/CO SERPL IA: <0.1 S/CO RATIO (ref 0–0.9)
HDLC SERPL-MCNC: 40 MG/DL
HGB BLD-MCNC: 14.7 G/DL (ref 12.6–17.7)
IMM GRANULOCYTES # BLD: 0 X10E3/UL (ref 0–0.1)
IMM GRANULOCYTES NFR BLD: 0 %
INTERPRETATION, 910389: NORMAL
INTERPRETATION: NORMAL
LDLC SERPL CALC-MCNC: 160 MG/DL (ref 0–99)
LYMPHOCYTES # BLD AUTO: 2.9 X10E3/UL (ref 0.7–3.1)
LYMPHOCYTES NFR BLD AUTO: 30 %
MCH RBC QN AUTO: 27.9 PG (ref 26.6–33)
MCHC RBC AUTO-ENTMCNC: 33.4 G/DL (ref 31.5–35.7)
MCV RBC AUTO: 84 FL (ref 79–97)
MONOCYTES # BLD AUTO: 0.6 X10E3/UL (ref 0.1–0.9)
MONOCYTES NFR BLD AUTO: 6 %
NEUTROPHILS # BLD AUTO: 5.8 X10E3/UL (ref 1.4–7)
NEUTROPHILS NFR BLD AUTO: 60 %
PDF IMAGE, 910387: NORMAL
PLATELET # BLD AUTO: 279 X10E3/UL (ref 150–379)
POTASSIUM SERPL-SCNC: 4.8 MMOL/L (ref 3.5–5.2)
PROT SERPL-MCNC: 6.5 G/DL (ref 6–8.5)
RBC # BLD AUTO: 5.26 X10E6/UL (ref 4.14–5.8)
SODIUM SERPL-SCNC: 140 MMOL/L (ref 134–144)
T4 FREE SERPL-MCNC: 1.07 NG/DL (ref 0.82–1.77)
TRIGL SERPL-MCNC: 127 MG/DL (ref 0–149)
TSH SERPL DL<=0.005 MIU/L-ACNC: 2.26 UIU/ML (ref 0.45–4.5)
VLDLC SERPL CALC-MCNC: 25 MG/DL (ref 5–40)
WBC # BLD AUTO: 9.6 X10E3/UL (ref 3.4–10.8)

## 2017-06-01 RX ORDER — FENOFIBRATE 145 MG/1
145 TABLET, COATED ORAL DAILY
Qty: 30 TAB | Refills: 2 | Status: SHIPPED | OUTPATIENT
Start: 2017-06-01 | End: 2017-10-10 | Stop reason: SDUPTHER

## 2017-06-01 RX ORDER — ERGOCALCIFEROL 1.25 MG/1
50000 CAPSULE ORAL
Qty: 12 CAP | Refills: 1 | Status: SHIPPED | OUTPATIENT
Start: 2017-06-01 | End: 2017-10-10 | Stop reason: SDUPTHER

## 2017-06-01 NOTE — PROGRESS NOTES
pls notify Pt  Lipids elevated: i have increased his tricor to 145 mg . He should discontinue his previous dose and take new dose as directed - will repeat in 6 months. Vit D low- script sent to pharmact- take as directed once weekly. Creatinine elevated   And GFR( glomerular filtration rate) low- indication of abnormal kidney function- plan to repeat lab at his next visit but not to worried at this time.

## 2017-06-02 DIAGNOSIS — J00 ACUTE NASOPHARYNGITIS: ICD-10-CM

## 2017-06-06 RX ORDER — FLUTICASONE PROPIONATE 50 MCG
SPRAY, SUSPENSION (ML) NASAL
Qty: 1 BOTTLE | Refills: 6 | Status: SHIPPED | OUTPATIENT
Start: 2017-06-06 | End: 2019-08-26

## 2017-06-28 LAB
S PYO AG THROAT QL: NEGATIVE
VALID INTERNAL CONTROL?: YES

## 2017-07-07 DIAGNOSIS — I10 ESSENTIAL HYPERTENSION: ICD-10-CM

## 2017-07-10 RX ORDER — CLOPIDOGREL BISULFATE 75 MG/1
TABLET ORAL
Qty: 30 TAB | Refills: 1 | Status: SHIPPED | OUTPATIENT
Start: 2017-07-10 | End: 2017-09-17 | Stop reason: SDUPTHER

## 2017-07-14 DIAGNOSIS — J30.89 NON-SEASONAL ALLERGIC RHINITIS DUE TO OTHER ALLERGIC TRIGGER: Primary | ICD-10-CM

## 2017-07-14 RX ORDER — BENZOCAINE .13; .15; .5; 2 G/100G; G/100G; G/100G; G/100G
GEL ORAL
Qty: 1 BOTTLE | Refills: 0 | Status: SHIPPED | OUTPATIENT
Start: 2017-07-14 | End: 2017-11-20 | Stop reason: ALTCHOICE

## 2017-07-24 ENCOUNTER — OFFICE VISIT (OUTPATIENT)
Dept: FAMILY MEDICINE CLINIC | Age: 62
End: 2017-07-24

## 2017-07-24 VITALS
HEIGHT: 74 IN | DIASTOLIC BLOOD PRESSURE: 60 MMHG | HEART RATE: 54 BPM | WEIGHT: 195 LBS | RESPIRATION RATE: 17 BRPM | BODY MASS INDEX: 25.03 KG/M2 | SYSTOLIC BLOOD PRESSURE: 111 MMHG | TEMPERATURE: 96.9 F

## 2017-07-24 DIAGNOSIS — E78.2 MIXED HYPERLIPIDEMIA: ICD-10-CM

## 2017-07-24 DIAGNOSIS — J30.89 ALLERGIC RHINITIS DUE TO OTHER ALLERGIC TRIGGER, UNSPECIFIED RHINITIS SEASONALITY: ICD-10-CM

## 2017-07-24 DIAGNOSIS — I10 ESSENTIAL HYPERTENSION: Primary | ICD-10-CM

## 2017-07-24 DIAGNOSIS — F17.210 CIGARETTE NICOTINE DEPENDENCE WITHOUT COMPLICATION: ICD-10-CM

## 2017-07-24 RX ORDER — BUPROPION HYDROCHLORIDE 150 MG/1
150 TABLET, EXTENDED RELEASE ORAL 2 TIMES DAILY
Qty: 60 TAB | Refills: 2 | Status: SHIPPED | OUTPATIENT
Start: 2017-07-24 | End: 2017-11-20 | Stop reason: ALTCHOICE

## 2017-07-24 RX ORDER — FLUTICASONE PROPIONATE 50 MCG
SPRAY, SUSPENSION (ML) NASAL
Qty: 1 BOTTLE | Refills: 2 | Status: SHIPPED | OUTPATIENT
Start: 2017-07-24 | End: 2017-11-20 | Stop reason: SDUPTHER

## 2017-07-24 RX ORDER — CETIRIZINE HCL 10 MG
10 TABLET ORAL DAILY
Qty: 30 TAB | Refills: 2 | Status: SHIPPED | OUTPATIENT
Start: 2017-07-24 | End: 2019-08-26

## 2017-07-24 NOTE — PROGRESS NOTES
1. Have you been to the ER, urgent care clinic since your last visit? Hospitalized since your last visit? No    2. Have you seen or consulted any other health care providers outside of the [address] [place] [address] since your last visit? Include any pap smears or colon screening.  No     Patient presents for blood pressure concerns

## 2017-07-24 NOTE — PATIENT INSTRUCTIONS
DASH Diet: Care Instructions  Your Care Instructions  The DASH diet is an eating plan that can help lower your blood pressure. DASH stands for Dietary Approaches to Stop Hypertension. Hypertension is high blood pressure. The DASH diet focuses on eating foods that are high in calcium, potassium, and magnesium. These nutrients can lower blood pressure. The foods that are highest in these nutrients are fruits, vegetables, low-fat dairy products, nuts, seeds, and legumes. But taking calcium, potassium, and magnesium supplements instead of eating foods that are high in those nutrients does not have the same effect. The DASH diet also includes whole grains, fish, and poultry. The DASH diet is one of several lifestyle changes your doctor may recommend to lower your high blood pressure. Your doctor may also want you to decrease the amount of sodium in your diet. Lowering sodium while following the DASH diet can lower blood pressure even further than just the DASH diet alone. Follow-up care is a key part of your treatment and safety. Be sure to make and go to all appointments, and call your doctor if you are having problems. It's also a good idea to know your test results and keep a list of the medicines you take. How can you care for yourself at home? Following the DASH diet  · Eat 4 to 5 servings of fruit each day. A serving is 1 medium-sized piece of fruit, ½ cup chopped or canned fruit, 1/4 cup dried fruit, or 4 ounces (½ cup) of fruit juice. Choose fruit more often than fruit juice. · Eat 4 to 5 servings of vegetables each day. A serving is 1 cup of lettuce or raw leafy vegetables, ½ cup of chopped or cooked vegetables, or 4 ounces (½ cup) of vegetable juice. Choose vegetables more often than vegetable juice. · Get 2 to 3 servings of low-fat and fat-free dairy each day. A serving is 8 ounces of milk, 1 cup of yogurt, or 1 ½ ounces of cheese. · Eat 6 to 8 servings of grains each day.  A serving is 1 slice of bread, 1 ounce of dry cereal, or ½ cup of cooked rice, pasta, or cooked cereal. Try to choose whole-grain products as much as possible. · Limit lean meat, poultry, and fish to 2 servings each day. A serving is 3 ounces, about the size of a deck of cards. · Eat 4 to 5 servings of nuts, seeds, and legumes (cooked dried beans, lentils, and split peas) each week. A serving is 1/3 cup of nuts, 2 tablespoons of seeds, or ½ cup of cooked beans or peas. · Limit fats and oils to 2 to 3 servings each day. A serving is 1 teaspoon of vegetable oil or 2 tablespoons of salad dressing. · Limit sweets and added sugars to 5 servings or less a week. A serving is 1 tablespoon jelly or jam, ½ cup sorbet, or 1 cup of lemonade. · Eat less than 2,300 milligrams (mg) of sodium a day. If you limit your sodium to 1,500 mg a day, you can lower your blood pressure even more. Tips for success  · Start small. Do not try to make dramatic changes to your diet all at once. You might feel that you are missing out on your favorite foods and then be more likely to not follow the plan. Make small changes, and stick with them. Once those changes become habit, add a few more changes. · Try some of the following:  ¨ Make it a goal to eat a fruit or vegetable at every meal and at snacks. This will make it easy to get the recommended amount of fruits and vegetables each day. ¨ Try yogurt topped with fruit and nuts for a snack or healthy dessert. ¨ Add lettuce, tomato, cucumber, and onion to sandwiches. ¨ Combine a ready-made pizza crust with low-fat mozzarella cheese and lots of vegetable toppings. Try using tomatoes, squash, spinach, broccoli, carrots, cauliflower, and onions. ¨ Have a variety of cut-up vegetables with a low-fat dip as an appetizer instead of chips and dip. ¨ Sprinkle sunflower seeds or chopped almonds over salads. Or try adding chopped walnuts or almonds to cooked vegetables. ¨ Try some vegetarian meals using beans and peas. Add garbanzo or kidney beans to salads. Make burritos and tacos with mashed higgins beans or black beans. Where can you learn more? Go to http://jemma-javi.info/. Enter A801 in the search box to learn more about \"DASH Diet: Care Instructions. \"  Current as of: April 3, 2017  Content Version: 11.3  © 0492-1366 [employer]. Care instructions adapted under license by [employer] (which disclaims liability or warranty for this information). If you have questions about a medical condition or this instruction, always ask your healthcare professional. [place] [address] any warranty or liability for your use of this information. Stopping Smoking: Care Instructions  Your Care Instructions  Cigarette smokers crave the nicotine in cigarettes. Giving it up is much harder than simply changing a habit. Your body has to stop craving the nicotine. It is hard to quit, but you can do it. There are many tools that people use to quit smoking. You may find that combining tools works best for you. There are several steps to quitting. First you get ready to quit. Then you get support to help you. After that, you learn new skills and behaviors to become a nonsmoker. For many people, a necessary step is getting and using medicine. Your doctor will help you set up the plan that best meets your needs. You may want to attend a smoking cessation program to help you quit smoking. When you choose a program, look for one that has proven success. Ask your doctor for ideas. You will greatly increase your chances of success if you take medicine as well as get counseling or join a cessation program.  Some of the changes you feel when you first quit tobacco are uncomfortable. Your body will miss the nicotine at first, and you may feel short-tempered and grumpy. You may have trouble sleeping or concentrating. Medicine can help you deal with these symptoms.  You may struggle with changing your smoking habits and rituals. The last step is the tricky one: Be prepared for the smoking urge to continue for a time. This is a lot to deal with, but keep at it. You will feel better. Follow-up care is a key part of your treatment and safety. Be sure to make and go to all appointments, and call your doctor if you are having problems. Its also a good idea to know your test results and keep a list of the medicines you take. How can you care for yourself at home? · Ask your family, friends, and coworkers for support. You have a better chance of quitting if you have help and support. · Join a support group, such as Nicotine Anonymous, for people who are trying to quit smoking. · Consider signing up for a smoking cessation program, such as the American Lung Association's Freedom from Smoking program.  · Set a quit date. Pick your date carefully so that it is not right in the middle of a big deadline or stressful time. Once you quit, do not even take a puff. Get rid of all ashtrays and lighters after your last cigarette. Clean your house and your clothes so that they do not smell of smoke. · Learn how to be a nonsmoker. Think about ways you can avoid those things that make you reach for a cigarette. ¨ Avoid situations that put you at greatest risk for smoking. For some people, it is hard to have a drink with friends without smoking. For others, they might skip a coffee break with coworkers who smoke. ¨ Change your daily routine. Take a different route to work or eat a meal in a different place. · Cut down on stress. Calm yourself or release tension by doing an activity you enjoy, such as reading a book, taking a hot bath, or gardening. · Talk to your doctor or pharmacist about nicotine replacement therapy, which replaces the nicotine in your body. You still get nicotine but you do not use tobacco. Nicotine replacement products help you slowly reduce the amount of nicotine you need.  These products come in several forms, many of them available over-the-counter:  ¨ Nicotine patches  ¨ Nicotine gum and lozenges  ¨ Nicotine inhaler  · Ask your doctor about bupropion (Wellbutrin) or varenicline (Chantix), which are prescription medicines. They do not contain nicotine. They help you by reducing withdrawal symptoms, such as stress and anxiety. · Some people find hypnosis, acupuncture, and massage helpful for ending the smoking habit. · Eat a healthy diet and get regular exercise. Having healthy habits will help your body move past its craving for nicotine. · Be prepared to keep trying. Most people are not successful the first few times they try to quit. Do not get mad at yourself if you smoke again. Make a list of things you learned and think about when you want to try again, such as next week, next month, or next year. Where can you learn more? Go to http://jemma-javi.info/. Enter S407 in the search box to learn more about \"Stopping Smoking: Care Instructions. \"  Current as of: March 20, 2017  Content Version: 11.3  © 2109-0978 [employer]. Care instructions adapted under license by [employer] (which disclaims liability or warranty for this information). If you have questions about a medical condition or this instruction, always ask your healthcare professional. [address] any warranty or liability for your use of this information.

## 2017-07-24 NOTE — PROGRESS NOTES
Chief Complaint   Patient presents with    Hypertension     *Follow up    Cholesterol Problem     *Follow up       HPI:    This is a 61 y/o  patient who presents for follow up chronic condition    HTN: BP stable and controlled on current medication. Compliant taking medication and checking BP at home. BP log reviewed was all within normal limits except for 2 systolic BP in the 517'A. Patient complain sneezing and running nose- need allergy medication refill. Hyperlipidemia: doing well on Tricor- need refill today. Currently an every day smoker- 1 pack/day  Pt want to try medication to help with smoking cessation. ROS: Pt denies: Wt loss, Fever/Chills, HA, Visual changes, Fatigue, Chest pain, SOB, ALMANZAR, Abd pain, N/V/D/C, Blood in stool or urine, Edema. Pertinent positive as above in HPI. All others were negative         Past Medical History:   Diagnosis Date    Hypercholesterolemia     Hypertension        History reviewed. No pertinent surgical history. History reviewed. No pertinent family history. Social History     Social History    Marital status:      Spouse name: N/A    Number of children: N/A    Years of education: N/A     Occupational History    Not on file. Social History Main Topics    Smoking status: Current Every Day Smoker     Packs/day: 1.00     Types: Cigarettes    Smokeless tobacco: Never Used    Alcohol use Yes      Comment: OCCASSIONALLY    Drug use: No    Sexual activity: Yes     Partners: Female     Other Topics Concern    Not on file     Social History Narrative     Current Outpatient Prescriptions   Medication Sig Dispense Refill    budesonide (RHINOCORT AQUA) 32 mcg/actuation nasal spray !  Spray into each nostril daily 1 Bottle 0    clopidogrel (PLAVIX) 75 mg tab take 1 tablet by mouth once daily 30 Tab 1    amLODIPine (NORVASC) 5 mg tablet take 1 tablet by mouth once daily 30 Tab 2    fluticasone (FLONASE) 50 mcg/actuation nasal spray 1 spray into each nostril daily 1 Bottle 6    fenofibrate nanocrystallized (TRICOR) 145 mg tablet Take 1 Tab by mouth daily. 30 Tab 2    ergocalciferol (ERGOCALCIFEROL) 50,000 unit capsule Take 1 Cap by mouth every seven (7) days. 12 Cap 1    cetirizine (ZYRTEC) 10 mg tablet Take 1 Tab by mouth daily. 30 Tab 0    Omega-3 Fatty Acids (FISH OIL) 500 mg cap Take  by mouth.  aspirin 81 mg chewable tablet Take 81 mg by mouth daily.  valsartan (DIOVAN) 320 mg tablet Take 1 Tab by mouth daily. 320 Tab 1    nebivolol (BYSTOLIC) 5 mg tablet Take 1 Tab by mouth daily. 30 Tab 1    ascorbic acid, vitamin C, (VITAMIN C) 500 mg tablet Take  by mouth. Allergies   Allergen Reactions    Simvastatin Other (comments)         Physical Exam:    Vital Signs:   Visit Vitals    /60    Pulse (!) 54    Temp 96.9 °F (36.1 °C) (Oral)    Resp 17    Ht 6' 2\" (1.88 m)    Wt 195 lb (88.5 kg)    BMI 25.04 kg/m2         General: a, a & o x 3, afebrile, interacting appropriately, in no acute distress  HEENT: head NC/AT, conjuctiva clear  Respiratory: lung sounds clear bilaterally,  no wheezes or crackles  Cardiovascular: normal S1S2, regular rate and rhythm, no murmurs  Abdomen: non-distended, normal bowel sounds x 4 quadrants, soft, non-tender to palpation  Musculoskeletal: normal ROM on all joints, no swelling or deformity  Skin: warm and dry. No rash  Psychiatric: a, a & o x 3, appropriate mood and affect, no thought disorder       Assessment/Plan:      ICD-10-CM ICD-9-CM    1. Essential hypertension ( controlled) I10 401.9 Continue with current medication   2. Mixed hyperlipidemia E78.2 272.2 Tricor 145 mg dialy   3. Allergic rhinitis due to other allergic trigger, unspecified rhinitis seasonality J30.89 477.8 fluticasone (FLONASE) 50 mcg/actuation nasal spray      cetirizine (ZYRTEC) 10 mg tablet   4.  Cigarette nicotine dependence without complication L95.596 636.9 buPROPion SR (WELLBUTRIN SR) 150 mg SR tablet    The patient was counseled on the dangers of tobacco use, and was advised to quit. Reviewed strategies to maximize success, including removing cigarettes and smoking materials from environment, stress management, support of family/friends, written materials and pharmacotherapy (Zyban was stared today. Side effect of suicidality and neuropsychiatric disorder discussed with patient and she was advised to discontinue if any of this occur . ). Additional Notes: Discussed today's diagnosis, treatment plans. Discussed medication indications and side effects. After Visit Summary: Provided and discussed printed patient instructions. Answered questions in relation to today's diagnosis.   Follow-up Disposition: 4 months      Mikey Giraldo NP-BC  Family Medicine  [facility]        Orders Placed This Encounter    fluticasone (FLONASE) 50 mcg/actuation nasal spray    cetirizine (ZYRTEC) 10 mg tablet    buPROPion SR (WELLBUTRIN SR) 150 mg SR tablet

## 2017-07-24 NOTE — MR AVS SNAPSHOT
Visit Information Date & Time Provider Department Dept. Phone Encounter #  
 7/24/2017  1:30 PM Baljeet Key NP [address] [facility] [phone] Follow-up Instructions Return in about 4 months (around 11/24/2017) for follow up chronic condition. Your Appointments 8/25/2017  2:30 PM  
Follow Up with LIZETT Hilliard ([facility]) Appt Note: 3 mo f/u  
 Christen Kedar. [address]  
  
   
 [address] Upcoming Health Maintenance Date Due ZOSTER VACCINE AGE 60> 11/12/2013 INFLUENZA AGE 9 TO ADULT 8/1/2017 COLONOSCOPY 5/30/2018 DTaP/Tdap/Td series (2 - Td) 2/12/2023 Allergies as of 7/24/2017  Review Complete On: 7/24/2017 By: Jeanette Sin LPN Severity Noted Reaction Type Reactions Simvastatin Medium 05/07/2015    Other (comments) Current Immunizations  Reviewed on 7/24/2017 Name Date Influenza Vaccine 10/24/2013 12:00 AM  
 Pneumococcal Polysaccharide (PPSV-23) 1/16/2014 12:00 AM  
 Zoster Vaccine, Live 2/16/2015 12:00 AM  
  
 Reviewed by Jeanette Sin LPN on 2/42/7015 at  1:34 PM  
 Reviewed by Jeanette Sin LPN on 6/16/9938 at  1:34 PM  
You Were Diagnosed With   
  
 Codes Comments Essential hypertension    -  Primary ICD-10-CM: I10 
ICD-9-CM: 401.9 Mixed hyperlipidemia     ICD-10-CM: E78.2 ICD-9-CM: 272.2 Allergic rhinitis due to other allergic trigger, unspecified rhinitis seasonality     ICD-10-CM: J30.89 ICD-9-CM: 477.8 Cigarette nicotine dependence without complication     OMZ-14-YG: F17.210 ICD-9-CM: 305.1 Vitals BP Pulse Temp Resp Height(growth percentile) Weight(growth percentile) 111/60 (!) 54 96.9 °F (36.1 °C) (Oral) 17 6' 2\" (1.88 m) 195 lb (88.5 kg) BMI Smoking Status 25.04 kg/m2 Current Every Day Smoker Vitals History BMI and BSA Data Body Mass Index Body Surface Area 25.04 kg/m 2 2.15 m 2 Preferred Pharmacy Pharmacy Name Phone [id number], [place] - [id number] [facility] [phone] Your Updated Medication List  
  
   
This list is accurate as of: 7/24/17  2:13 PM.  Always use your most recent med list. amLODIPine 5 mg tablet Commonly known as:  NORVASC  
take 1 tablet by mouth once daily  
  
 aspirin 81 mg chewable tablet Take 81 mg by mouth daily. budesonide 32 mcg/actuation nasal spray Commonly known as:  RHINOCORT AQUA  
! Spray into each nostril daily buPROPion  mg SR tablet Commonly known as:  Betha Lamer Take 1 Tab by mouth two (2) times a day. * cetirizine 10 mg tablet Commonly known as:  ZYRTEC Take 1 Tab by mouth daily. * cetirizine 10 mg tablet Commonly known as:  ZYRTEC Take 1 Tab by mouth daily. clopidogrel 75 mg Tab Commonly known as:  PLAVIX  
take 1 tablet by mouth once daily  
  
 ergocalciferol 50,000 unit capsule Commonly known as:  ERGOCALCIFEROL Take 1 Cap by mouth every seven (7) days. fenofibrate nanocrystallized 145 mg tablet Commonly known as:  [employer] Take 1 Tab by mouth daily. FISH  mg Cap Generic drug:  Omega-3 Fatty Acids Take  by mouth. * fluticasone 50 mcg/actuation nasal spray Commonly known as:  FLONASE  
1 spray into each nostril daily * fluticasone 50 mcg/actuation nasal spray Commonly known as:  Trinh Bill Instil 1 into each daily  
  
 nebivolol 5 mg tablet Commonly known as:  BYSTOLIC Take 1 Tab by mouth daily. valsartan 320 mg tablet Commonly known as:  DIOVAN Take 1 Tab by mouth daily. VITAMIN C 500 mg tablet Generic drug:  ascorbic acid (vitamin C) Take  by mouth. * Notice:   This list has 4 medication(s) that are the same as other medications prescribed for you. Read the directions carefully, and ask your doctor or other care provider to review them with you. Prescriptions Printed Refills  
 fluticasone (FLONASE) 50 mcg/actuation nasal spray 2 Sig: Instil 1 into each daily Class: Print Prescriptions Sent to Pharmacy Refills  
 cetirizine (ZYRTEC) 10 mg tablet 2 Sig: Take 1 Tab by mouth daily. Class: Normal  
 Pharmacy: [facility], [address] Ph #: [phone] Route: Oral  
 buPROPion SR (WELLBUTRIN SR) 150 mg SR tablet 2 Sig: Take 1 Tab by mouth two (2) times a day. Class: Normal  
 Pharmacy: [facility] [address] [place], [address] Ph #: [phone] Route: Oral  
  
Follow-up Instructions Return in about 4 months (around 11/24/2017) for follow up chronic condition. Patient Instructions DASH Diet: Care Instructions Your Care Instructions The DASH diet is an eating plan that can help lower your blood pressure. DASH stands for Dietary Approaches to Stop Hypertension. Hypertension is high blood pressure. The DASH diet focuses on eating foods that are high in calcium, potassium, and magnesium. These nutrients can lower blood pressure. The foods that are highest in these nutrients are fruits, vegetables, low-fat dairy products, nuts, seeds, and legumes. But taking calcium, potassium, and magnesium supplements instead of eating foods that are high in those nutrients does not have the same effect. The DASH diet also includes whole grains, fish, and poultry. The DASH diet is one of several lifestyle changes your doctor may recommend to lower your high blood pressure. Your doctor may also want you to decrease the amount of sodium in your diet. Lowering sodium while following the DASH diet can lower blood pressure even further than just the DASH diet alone. Follow-up care is a key part of your treatment and safety. Be sure to make and go to all appointments, and call your doctor if you are having problems. It's also a good idea to know your test results and keep a list of the medicines you take. How can you care for yourself at home? Following the DASH diet · Eat 4 to 5 servings of fruit each day. A serving is 1 medium-sized piece of fruit, ½ cup chopped or canned fruit, 1/4 cup dried fruit, or 4 ounces (½ cup) of fruit juice. Choose fruit more often than fruit juice. · Eat 4 to 5 servings of vegetables each day. A serving is 1 cup of lettuce or raw leafy vegetables, ½ cup of chopped or cooked vegetables, or 4 ounces (½ cup) of vegetable juice. Choose vegetables more often than vegetable juice. · Get 2 to 3 servings of low-fat and fat-free dairy each day. A serving is 8 ounces of milk, 1 cup of yogurt, or 1 ½ ounces of cheese. · Eat 6 to 8 servings of grains each day. A serving is 1 slice of bread, 1 ounce of dry cereal, or ½ cup of cooked rice, pasta, or cooked cereal. Try to choose whole-grain products as much as possible. · Limit lean meat, poultry, and fish to 2 servings each day. A serving is 3 ounces, about the size of a deck of cards. · Eat 4 to 5 servings of nuts, seeds, and legumes (cooked dried beans, lentils, and split peas) each week. A serving is 1/3 cup of nuts, 2 tablespoons of seeds, or ½ cup of cooked beans or peas. · Limit fats and oils to 2 to 3 servings each day. A serving is 1 teaspoon of vegetable oil or 2 tablespoons of salad dressing. · Limit sweets and added sugars to 5 servings or less a week. A serving is 1 tablespoon jelly or jam, ½ cup sorbet, or 1 cup of lemonade. · Eat less than 2,300 milligrams (mg) of sodium a day. If you limit your sodium to 1,500 mg a day, you can lower your blood pressure even more. Tips for success · Start small.  Do not try to make dramatic changes to your diet all at once. You might feel that you are missing out on your favorite foods and then be more likely to not follow the plan. Make small changes, and stick with them. Once those changes become habit, add a few more changes. · Try some of the following: ¨ Make it a goal to eat a fruit or vegetable at every meal and at snacks. This will make it easy to get the recommended amount of fruits and vegetables each day. ¨ Try yogurt topped with fruit and nuts for a snack or healthy dessert. ¨ Add lettuce, tomato, cucumber, and onion to sandwiches. ¨ Combine a ready-made pizza crust with low-fat mozzarella cheese and lots of vegetable toppings. Try using tomatoes, squash, spinach, broccoli, carrots, cauliflower, and onions. ¨ Have a variety of cut-up vegetables with a low-fat dip as an appetizer instead of chips and dip. ¨ Sprinkle sunflower seeds or chopped almonds over salads. Or try adding chopped walnuts or almonds to cooked vegetables. ¨ Try some vegetarian meals using beans and peas. Add garbanzo or kidney beans to salads. Make burritos and tacos with mashed higgins beans or black beans. Where can you learn more? Go to http://jemma-javi.info/. Enter A910 in the search box to learn more about \"DASH Diet: Care Instructions. \" Current as of: April 3, 2017 Content Version: 11.3 © 9777-1520 [employer]. Care instructions adapted under license by [employer] (which disclaims liability or warranty for this information). If you have questions about a medical condition or this instruction, always ask your healthcare professional. [place] [address] any warranty or liability for your use of this information. Stopping Smoking: Care Instructions Your Care Instructions Cigarette smokers crave the nicotine in cigarettes. Giving it up is much harder than simply changing a habit.  Your body has to stop craving the nicotine. It is hard to quit, but you can do it. There are many tools that people use to quit smoking. You may find that combining tools works best for you. There are several steps to quitting. First you get ready to quit. Then you get support to help you. After that, you learn new skills and behaviors to become a nonsmoker. For many people, a necessary step is getting and using medicine. Your doctor will help you set up the plan that best meets your needs. You may want to attend a smoking cessation program to help you quit smoking. When you choose a program, look for one that has proven success. Ask your doctor for ideas. You will greatly increase your chances of success if you take medicine as well as get counseling or join a cessation program. 
Some of the changes you feel when you first quit tobacco are uncomfortable. Your body will miss the nicotine at first, and you may feel short-tempered and grumpy. You may have trouble sleeping or concentrating. Medicine can help you deal with these symptoms. You may struggle with changing your smoking habits and rituals. The last step is the tricky one: Be prepared for the smoking urge to continue for a time. This is a lot to deal with, but keep at it. You will feel better. Follow-up care is a key part of your treatment and safety. Be sure to make and go to all appointments, and call your doctor if you are having problems. Its also a good idea to know your test results and keep a list of the medicines you take. How can you care for yourself at home? · Ask your family, friends, and coworkers for support. You have a better chance of quitting if you have help and support. · Join a support group, such as Nicotine Anonymous, for people who are trying to quit smoking. · Consider signing up for a smoking cessation program, such as the American Lung Association's Freedom from Smoking program. 
· Set a quit date.  Pick your date carefully so that it is not right in the middle of a big deadline or stressful time. Once you quit, do not even take a puff. Get rid of all ashtrays and lighters after your last cigarette. Clean your house and your clothes so that they do not smell of smoke. · Learn how to be a nonsmoker. Think about ways you can avoid those things that make you reach for a cigarette. ¨ Avoid situations that put you at greatest risk for smoking. For some people, it is hard to have a drink with friends without smoking. For others, they might skip a coffee break with coworkers who smoke. ¨ Change your daily routine. Take a different route to work or eat a meal in a different place. · Cut down on stress. Calm yourself or release tension by doing an activity you enjoy, such as reading a book, taking a hot bath, or gardening. · Talk to your doctor or pharmacist about nicotine replacement therapy, which replaces the nicotine in your body. You still get nicotine but you do not use tobacco. Nicotine replacement products help you slowly reduce the amount of nicotine you need. These products come in several forms, many of them available over-the-counter: ¨ Nicotine patches ¨ Nicotine gum and lozenges ¨ Nicotine inhaler · Ask your doctor about bupropion (Wellbutrin) or varenicline (Chantix), which are prescription medicines. They do not contain nicotine. They help you by reducing withdrawal symptoms, such as stress and anxiety. · Some people find hypnosis, acupuncture, and massage helpful for ending the smoking habit. · Eat a healthy diet and get regular exercise. Having healthy habits will help your body move past its craving for nicotine. · Be prepared to keep trying. Most people are not successful the first few times they try to quit. Do not get mad at yourself if you smoke again. Make a list of things you learned and think about when you want to try again, such as next week, next month, or next year. Where can you learn more? Go to http://jemma-javi.info/. Enter D438 in the search box to learn more about \"Stopping Smoking: Care Instructions. \" Current as of: March 20, 2017 Content Version: 11.3 © 5731-0322 [employer]. Care instructions adapted under license by [employer] (which disclaims liability or warranty for this information). If you have questions about a medical condition or this instruction, always ask your healthcare professional. [place] [address] any warranty or liability for your use of this information. Introducing [facility]! [employer] introduces CARDFREE patient portal. Now you can access parts of your medical record, email your doctor's office, and request medication refills online. 1. In your internet browser, go to [email or web address] 2. Click on the First Time User? Click Here link in the Sign In box. You will see the New Member Sign Up page. 3. Enter your CARDFREE Access Code exactly as it appears below. You will not need to use this code after youve completed the sign-up process. If you do not sign up before the expiration date, you must request a new code. · CARDFREE Access Code: [email or web address] Expires: 8/14/2017  3:55 PM 
 
4. Enter the last four digits of your Social Security Number (xxxx) and Date of Birth (mm/dd/yyyy) as indicated and click Submit. You will be taken to the next sign-up page. 5. Create a CARDFREE ID. This will be your CARDFREE login ID and cannot be changed, so think of one that is secure and easy to remember. 6. Create a CARDFREE password. You can change your password at any time. 7. Enter your Password Reset Question and Answer. This can be used at a later time if you forget your password. 8. Enter your e-mail address. You will receive e-mail notification when new information is available in [address] 9. Click Sign Up.  You can now view and download portions of your medical record. 10. Click the Download Summary menu link to download a portable copy of your medical information. If you have questions, please visit the Frequently Asked Questions section of the [employer] website. Remember, [employer]io is NOT to be used for urgent needs. For medical emergencies, dial 911. Now available from your iPhone and Android! Please provide this summary of care documentation to your next provider. Your primary care clinician is listed as Umberto Miller. If you have any questions after today's visit, please call [phone].

## 2017-09-17 DIAGNOSIS — I10 ESSENTIAL HYPERTENSION: ICD-10-CM

## 2017-09-17 RX ORDER — CLOPIDOGREL BISULFATE 75 MG/1
TABLET ORAL
Qty: 30 TAB | Refills: 1 | Status: SHIPPED | OUTPATIENT
Start: 2017-09-17 | End: 2017-11-20 | Stop reason: SDUPTHER

## 2017-10-03 DIAGNOSIS — I10 ESSENTIAL HYPERTENSION: ICD-10-CM

## 2017-10-03 RX ORDER — AMLODIPINE BESYLATE 5 MG/1
TABLET ORAL
Qty: 30 TAB | Refills: 2 | Status: SHIPPED | OUTPATIENT
Start: 2017-10-03 | End: 2017-11-20 | Stop reason: SDUPTHER

## 2017-10-10 DIAGNOSIS — E78.2 MIXED HYPERLIPIDEMIA: ICD-10-CM

## 2017-10-10 DIAGNOSIS — E55.9 VITAMIN D DEFICIENCY: ICD-10-CM

## 2017-10-11 RX ORDER — FENOFIBRATE 145 MG/1
TABLET, COATED ORAL
Qty: 30 TAB | Refills: 2 | Status: SHIPPED | OUTPATIENT
Start: 2017-10-11 | End: 2017-11-20 | Stop reason: SDUPTHER

## 2017-10-11 RX ORDER — ERGOCALCIFEROL 1.25 MG/1
CAPSULE ORAL
Qty: 12 CAP | Refills: 1 | Status: SHIPPED | OUTPATIENT
Start: 2017-10-11 | End: 2019-05-24

## 2017-11-20 ENCOUNTER — OFFICE VISIT (OUTPATIENT)
Dept: FAMILY MEDICINE CLINIC | Age: 62
End: 2017-11-20

## 2017-11-20 VITALS
DIASTOLIC BLOOD PRESSURE: 68 MMHG | TEMPERATURE: 95.9 F | RESPIRATION RATE: 16 BRPM | HEIGHT: 74 IN | OXYGEN SATURATION: 99 % | SYSTOLIC BLOOD PRESSURE: 112 MMHG | BODY MASS INDEX: 27.16 KG/M2 | HEART RATE: 66 BPM | WEIGHT: 211.6 LBS

## 2017-11-20 DIAGNOSIS — Z23 ENCOUNTER FOR IMMUNIZATION: ICD-10-CM

## 2017-11-20 DIAGNOSIS — E78.2 MIXED HYPERLIPIDEMIA: ICD-10-CM

## 2017-11-20 DIAGNOSIS — E66.3 OVERWEIGHT (BMI 25.0-29.9): ICD-10-CM

## 2017-11-20 DIAGNOSIS — I10 ESSENTIAL HYPERTENSION: Primary | ICD-10-CM

## 2017-11-20 DIAGNOSIS — N18.30 CKD (CHRONIC KIDNEY DISEASE) STAGE 3, GFR 30-59 ML/MIN (HCC): ICD-10-CM

## 2017-11-20 DIAGNOSIS — E55.9 VITAMIN D INSUFFICIENCY: ICD-10-CM

## 2017-11-20 DIAGNOSIS — F17.210 CIGARETTE NICOTINE DEPENDENCE WITHOUT COMPLICATION: ICD-10-CM

## 2017-11-20 RX ORDER — NEBIVOLOL 5 MG/1
5 TABLET ORAL DAILY
Qty: 30 TAB | Refills: 5 | Status: SHIPPED | OUTPATIENT
Start: 2017-11-20 | End: 2018-05-21 | Stop reason: SDUPTHER

## 2017-11-20 RX ORDER — CLOPIDOGREL BISULFATE 75 MG/1
75 TABLET ORAL DAILY
Qty: 30 TAB | Refills: 5 | Status: SHIPPED | OUTPATIENT
Start: 2017-11-20 | End: 2018-04-23 | Stop reason: SDUPTHER

## 2017-11-20 RX ORDER — AMLODIPINE BESYLATE 5 MG/1
TABLET ORAL
Qty: 30 TAB | Refills: 5 | Status: SHIPPED | OUTPATIENT
Start: 2017-11-20 | End: 2018-05-21 | Stop reason: SDUPTHER

## 2017-11-20 RX ORDER — VALSARTAN 320 MG/1
320 TABLET ORAL DAILY
Qty: 320 TAB | Refills: 5 | Status: SHIPPED | OUTPATIENT
Start: 2017-11-20 | End: 2018-08-28 | Stop reason: ALTCHOICE

## 2017-11-20 RX ORDER — FENOFIBRATE 145 MG/1
TABLET, COATED ORAL
Qty: 30 TAB | Refills: 5 | Status: SHIPPED | OUTPATIENT
Start: 2017-11-20 | End: 2018-06-18 | Stop reason: SDUPTHER

## 2017-11-20 NOTE — PATIENT INSTRUCTIONS
Influenza (Flu) Vaccine (Inactivated or Recombinant): What You Need to Know  Why get vaccinated? Influenza (\"flu\") is a contagious disease that spreads around the [place] every winter, usually between October and May. Flu is caused by influenza viruses and is spread mainly by coughing, sneezing, and close contact. Anyone can get flu. Flu strikes suddenly and can last several days. Symptoms vary by age, but can include:  · Fever/chills. · Sore throat. · Muscle aches. · Fatigue. · Cough. · Headache. · Runny or stuffy nose. Flu can also lead to pneumonia and blood infections, and cause diarrhea and seizures in children. If you have a medical condition, such as heart or lung disease, flu can make it worse. Flu is more dangerous for some people. Infants and young children, people 72years of age and older, pregnant women, and people with certain health conditions or a weakened immune system are at greatest risk. Each year thousands of people in the [facility] die from flu, and many more are hospitalized. Flu vaccine can:  · Keep you from getting flu. · Make flu less severe if you do get it. · Keep you from spreading flu to your family and other people. Inactivated and recombinant flu vaccines  A dose of flu vaccine is recommended every flu season. Children 6 months through 6years of age may need two doses during the same flu season. Everyone else needs only one dose each flu season. Some inactivated flu vaccines contain a very small amount of a mercury-based preservative called thimerosal. Studies have not shown thimerosal in vaccines to be harmful, but flu vaccines that do not contain thimerosal are available. There is no live flu virus in flu shots. They cannot cause the flu. There are many flu viruses, and they are always changing. Each year a new flu vaccine is made to protect against three or four viruses that are likely to cause disease in the upcoming flu season.  But even when the vaccine doesn't exactly match these viruses, it may still provide some protection. Flu vaccine cannot prevent:  · Flu that is caused by a virus not covered by the vaccine. · Illnesses that look like flu but are not. Some people should not get this vaccine  Tell the person who is giving you the vaccine:  · If you have any severe (life-threatening) allergies. If you ever had a life-threatening allergic reaction after a dose of flu vaccine, or have a severe allergy to any part of this vaccine, you may be advised not to get vaccinated. Most, but not all, types of flu vaccine contain a small amount of egg protein. · If you ever had Guillain-Barré syndrome (also called GBS) Some people with a history of GBS should not get this vaccine. This should be discussed with your doctor. · If you are not feeling well. It is usually okay to get flu vaccine when you have a mild illness, but you might be asked to come back when you feel better. Risks of a vaccine reaction  With any medicine, including vaccines, there is a chance of reactions. These are usually mild and go away on their own, but serious reactions are also possible. Most people who get a flu shot do not have any problems with it. Minor problems following a flu shot include:  · Soreness, redness, or swelling where the shot was given  · Hoarseness  · Sore, red or itchy eyes  · Cough  · Fever  · Aches  · Headache  · Itching  · Fatigue  If these problems occur, they usually begin soon after the shot and last 1 or 2 days. More serious problems following a flu shot can include the following:  · There may be a small increased risk of Guillain-Barré Syndrome (GBS) after inactivated flu vaccine. This risk has been estimated at 1 or 2 additional cases per million people vaccinated. This is much lower than the risk of severe complications from flu, which can be prevented by flu vaccine.   · Nigel Pizarro children who get the flu shot along with pneumococcal vaccine (PCV13) and/or DTaP vaccine at the same time might be slightly more likely to have a seizure caused by fever. Ask your doctor for more information. Tell your doctor if a child who is getting flu vaccine has ever had a seizure  Problems that could happen after any injected vaccine:  · People sometimes faint after a medical procedure, including vaccination. Sitting or lying down for about 15 minutes can help prevent fainting, and injuries caused by a fall. Tell your doctor if you feel dizzy, or have vision changes or ringing in the ears. · Some people get severe pain in the shoulder and have difficulty moving the arm where a shot was given. This happens very rarely. · Any medication can cause a severe allergic reaction. Such reactions from a vaccine are very rare, estimated at about 1 in a million doses, and would happen within a few minutes to a few hours after the vaccination. As with any medicine, there is a very remote chance of a vaccine causing a serious injury or death. The safety of vaccines is always being monitored. For more information, visit: www.cdc.gov/vaccinesafety/. What if there is a serious reaction? What should I look for? · Look for anything that concerns you, such as signs of a severe allergic reaction, very high fever, or unusual behavior. Signs of a severe allergic reaction can include hives, swelling of the face and throat, difficulty breathing, a fast heartbeat, dizziness, and weakness - usually within a few minutes to a few hours after the vaccination. What should I do? · If you think it is a severe allergic reaction or other emergency that can't wait, call 9-1-1 and get the person to the nearest hospital. Otherwise, call your doctor. · Reactions should be reported to the \"Vaccine Adverse Event Reporting System\" (VAERS). Your doctor should file this report, or you can do it yourself through the VAERS website at www.vaers. [facility].gov, or by calling 5-883.583.1877.   [employer] does not give medical advice. The National Vaccine Injury Compensation Program  The National Vaccine Injury Compensation Program (VICP) is a federal program that was created to compensate people who may have been injured by certain vaccines. Persons who believe they may have been injured by a vaccine can learn about the program and about filing a claim by calling 7-790.446.2976 or visiting the [address] website at www.[facility].gov/vaccinecompensation. There is a time limit to file a claim for compensation. How can I learn more? · Ask your healthcare provider. He or she can give you the vaccine package insert or suggest other sources of information. · Call your local or state health department. · Contact the Centers for Disease Control and Prevention (CDC):  ¨ Call 8-896.514.6549 (1-800-CDC-INFO) or  ¨ Visit CDC's website at www.cdc.gov/flu  Vaccine Information Statement  Inactivated Influenza Vaccine  8/7/2015)  [address] [id number]  Department of Health and Human Services  Centers for Disease Control and Prevention  Many Vaccine Information Statements are available in [place] and other languages. See www.immunize.org/vis. Muchas hojas de información sobre vacunas están disponibles en español y en otros idiomas. Visite www.immunize.org/vis. Care instructions adapted under license by [employer] (which disclaims liability or warranty for this information). If you have questions about a medical condition or this instruction, always ask your healthcare professional. [place] [address] any warranty or liability for your use of this information. High Cholesterol: Care Instructions  Your Care Instructions    Cholesterol is a type of fat in your blood. It is needed for many body functions, such as making new cells. Cholesterol is made by your body. It also comes from food you eat. High cholesterol means that you have too much of the fat in your blood. This raises your risk of a heart attack and stroke.   LDL and HDL are part of your total cholesterol. LDL is the \"bad\" cholesterol. High LDL can raise your risk for heart disease, heart attack, and stroke. HDL is the \"good\" cholesterol. It helps clear bad cholesterol from the body. High HDL is linked with a lower risk of heart disease, heart attack, and stroke. Your cholesterol levels help your doctor find out your risk for having a heart attack or stroke. You and your doctor can talk about whether you need to lower your risk and what treatment is best for you. A heart-healthy lifestyle along with medicines can help lower your cholesterol and your risk. The way you choose to lower your risk will depend on how high your risk is for heart attack and stroke. It will also depend on how you feel about taking medicines. Follow-up care is a key part of your treatment and safety. Be sure to make and go to all appointments, and call your doctor if you are having problems. It's also a good idea to know your test results and keep a list of the medicines you take. How can you care for yourself at home? · Eat a variety of foods every day. Good choices include fruits, vegetables, whole grains (like oatmeal), dried beans and peas, nuts and seeds, soy products (like tofu), and fat-free or low-fat dairy products. · Replace butter, margarine, and hydrogenated or partially hydrogenated oils with olive and canola oils. (Canola oil margarine without trans fat is fine.)  · Replace red meat with fish, poultry, and soy protein (like tofu). · Limit processed and packaged foods like chips, crackers, and cookies. · Bake, broil, or steam foods. Don't sarkar them. · Be physically active. Get at least 30 minutes of exercise on most days of the week. Walking is a good choice. You also may want to do other activities, such as running, swimming, cycling, or playing tennis or team sports. · Stay at a healthy weight or lose weight by making the changes in eating and physical activity listed above.  Losing just a small amount of weight, even 5 to 10 pounds, can reduce your risk for having a heart attack or stroke. · Do not smoke. When should you call for help? Watch closely for changes in your health, and be sure to contact your doctor if:  ? · You need help making lifestyle changes. ? · You have questions about your medicine. Where can you learn more? Go to http://jemma-javi.info/. Enter D954 in the search box to learn more about \"High Cholesterol: Care Instructions. \"  Current as of: September 21, 2016  Content Version: 11.4  © 5290-0285 [employer]. Care instructions adapted under license by [employer] (which disclaims liability or warranty for this information). If you have questions about a medical condition or this instruction, always ask your healthcare professional. [address] any warranty or liability for your use of this information. DASH Diet: Care Instructions  Your Care Instructions    The DASH diet is an eating plan that can help lower your blood pressure. DASH stands for Dietary Approaches to Stop Hypertension. Hypertension is high blood pressure. The DASH diet focuses on eating foods that are high in calcium, potassium, and magnesium. These nutrients can lower blood pressure. The foods that are highest in these nutrients are fruits, vegetables, low-fat dairy products, nuts, seeds, and legumes. But taking calcium, potassium, and magnesium supplements instead of eating foods that are high in those nutrients does not have the same effect. The DASH diet also includes whole grains, fish, and poultry. The DASH diet is one of several lifestyle changes your doctor may recommend to lower your high blood pressure. Your doctor may also want you to decrease the amount of sodium in your diet. Lowering sodium while following the DASH diet can lower blood pressure even further than just the DASH diet alone.   Follow-up care is a key part of your treatment and safety. Be sure to make and go to all appointments, and call your doctor if you are having problems. It's also a good idea to know your test results and keep a list of the medicines you take. How can you care for yourself at home? Following the DASH diet  · Eat 4 to 5 servings of fruit each day. A serving is 1 medium-sized piece of fruit, ½ cup chopped or canned fruit, 1/4 cup dried fruit, or 4 ounces (½ cup) of fruit juice. Choose fruit more often than fruit juice. · Eat 4 to 5 servings of vegetables each day. A serving is 1 cup of lettuce or raw leafy vegetables, ½ cup of chopped or cooked vegetables, or 4 ounces (½ cup) of vegetable juice. Choose vegetables more often than vegetable juice. · Get 2 to 3 servings of low-fat and fat-free dairy each day. A serving is 8 ounces of milk, 1 cup of yogurt, or 1 ½ ounces of cheese. · Eat 6 to 8 servings of grains each day. A serving is 1 slice of bread, 1 ounce of dry cereal, or ½ cup of cooked rice, pasta, or cooked cereal. Try to choose whole-grain products as much as possible. · Limit lean meat, poultry, and fish to 2 servings each day. A serving is 3 ounces, about the size of a deck of cards. · Eat 4 to 5 servings of nuts, seeds, and legumes (cooked dried beans, lentils, and split peas) each week. A serving is 1/3 cup of nuts, 2 tablespoons of seeds, or ½ cup of cooked beans or peas. · Limit fats and oils to 2 to 3 servings each day. A serving is 1 teaspoon of vegetable oil or 2 tablespoons of salad dressing. · Limit sweets and added sugars to 5 servings or less a week. A serving is 1 tablespoon jelly or jam, ½ cup sorbet, or 1 cup of lemonade. · Eat less than 2,300 milligrams (mg) of sodium a day. If you limit your sodium to 1,500 mg a day, you can lower your blood pressure even more. Tips for success  · Start small. Do not try to make dramatic changes to your diet all at once.  You might feel that you are missing out on your favorite foods and then be more likely to not follow the plan. Make small changes, and stick with them. Once those changes become habit, add a few more changes. · Try some of the following:  ¨ Make it a goal to eat a fruit or vegetable at every meal and at snacks. This will make it easy to get the recommended amount of fruits and vegetables each day. ¨ Try yogurt topped with fruit and nuts for a snack or healthy dessert. ¨ Add lettuce, tomato, cucumber, and onion to sandwiches. ¨ Combine a ready-made pizza crust with low-fat mozzarella cheese and lots of vegetable toppings. Try using tomatoes, squash, spinach, broccoli, carrots, cauliflower, and onions. ¨ Have a variety of cut-up vegetables with a low-fat dip as an appetizer instead of chips and dip. ¨ Sprinkle sunflower seeds or chopped almonds over salads. Or try adding chopped walnuts or almonds to cooked vegetables. ¨ Try some vegetarian meals using beans and peas. Add garbanzo or kidney beans to salads. Make burritos and tacos with mashed higgins beans or black beans. Where can you learn more? Go to http://jemma-javi.info/. Enter O998 in the search box to learn more about \"DASH Diet: Care Instructions. \"  Current as of: September 21, 2016  Content Version: 11.4  © 1444-4748 [employer]. Care instructions adapted under license by [employer] (which disclaims liability or warranty for this information). If you have questions about a medical condition or this instruction, always ask your healthcare professional. [place] [address] any warranty or liability for your use of this information.

## 2017-11-20 NOTE — MR AVS SNAPSHOT
Visit Information Date & Time Provider Department Dept. Phone Encounter #  
 11/20/2017  8:00 AM Chuckie Spicer NP [place] [facility] [id number] Follow-up Instructions Return in about 6 months (around 5/20/2018) for follow up chronic conditions. Upcoming Health Maintenance Date Due COLONOSCOPY 5/30/2018 DTaP/Tdap/Td series (2 - Td) 2/12/2023 Allergies as of 11/20/2017  Review Complete On: 11/20/2017 By: Shane Zelaya LPN Severity Noted Reaction Type Reactions Simvastatin Medium 05/07/2015    Other (comments) Current Immunizations  Reviewed on 7/24/2017 Name Date Influenza Vaccine 10/24/2013 12:00 AM  
 Influenza Vaccine (Quad) PF  Incomplete Pneumococcal Polysaccharide (PPSV-23) 1/16/2014 12:00 AM  
 Zoster Vaccine, Live 2/16/2015 12:00 AM  
  
 Not reviewed this visit You Were Diagnosed With   
  
 Codes Comments Essential hypertension    -  Primary ICD-10-CM: I10 
ICD-9-CM: 401.9 Mixed hyperlipidemia     ICD-10-CM: E78.2 ICD-9-CM: 272.2 Vitamin D insufficiency     ICD-10-CM: E55.9 ICD-9-CM: 268.9 CKD (chronic kidney disease) stage 3, GFR 30-59 ml/min     ICD-10-CM: N18.3 ICD-9-CM: 585.3 Overweight (BMI 25.0-29. 9)     ICD-10-CM: G04.5 ICD-9-CM: 278.02 Encounter for immunization     ICD-10-CM: H86 ICD-9-CM: V03.89 Cigarette nicotine dependence without complication     AIM-48-YV: F17.210 ICD-9-CM: 305.1 Vitals BP Pulse Temp Resp Height(growth percentile) Weight(growth percentile) 112/68 66 95.9 °F (35.5 °C) (Oral) 16 6' 2\" (1.88 m) 211 lb 9.6 oz (96 kg) SpO2 BMI Smoking Status 99% 27.17 kg/m2 Current Every Day Smoker BMI and BSA Data Body Mass Index Body Surface Area  
 27.17 kg/m 2 2.24 m 2 Preferred Pharmacy Pharmacy Name Phone [facility] [address], [place] - [address] [place] [address] [phone] Your Updated Medication List  
  
   
This list is accurate as of: 11/20/17  9:30 AM.  Always use your most recent med list. amLODIPine 5 mg tablet Commonly known as:  NORVASC  
take 1 tablet by mouth once daily  
  
 aspirin 81 mg chewable tablet Take 81 mg by mouth daily. cetirizine 10 mg tablet Commonly known as:  ZYRTEC Take 1 Tab by mouth daily. clopidogrel 75 mg Tab Commonly known as:  PLAVIX Take 1 Tab by mouth daily. fenofibrate nanocrystallized 145 mg tablet Commonly known as:  TRICOR  
take 1 tablet by mouth once daily FISH  mg Cap Generic drug:  Omega-3 Fatty Acids Take  by mouth. fluticasone 50 mcg/actuation nasal spray Commonly known as:  FLONASE  
1 spray into each nostril daily  
  
 nebivolol 5 mg tablet Commonly known as:  BYSTOLIC Take 1 Tab by mouth daily. valsartan 320 mg tablet Commonly known as:  DIOVAN Take 1 Tab by mouth daily. VITAMIN C 500 mg tablet Generic drug:  ascorbic acid (vitamin C) Take  by mouth. VITAMIN D2 50,000 unit capsule Generic drug:  ergocalciferol  
take 1 capsule by mouth every week Prescriptions Sent to Pharmacy Refills  
 fenofibrate nanocrystallized (TRICOR) 145 mg tablet 5 Sig: take 1 tablet by mouth once daily Class: Normal  
 Pharmacy: [facility] [place] [address] Ph #: [phone]  
 amLODIPine (NORVASC) 5 mg tablet 5 Sig: take 1 tablet by mouth once daily Class: Normal  
 Pharmacy: [facility], [place] - [address] [facility] Ph #: [phone]  
 clopidogrel (PLAVIX) 75 mg tab 5 Sig: Take 1 Tab by mouth daily. Class: Normal  
 Pharmacy: [facility] [address], [address] Ph #: [phone] Route: Oral  
 valsartan (DIOVAN) 320 mg tablet 5 Sig: Take 1 Tab by mouth daily.   
 Class: Normal  
 Pharmacy: [facility] - Sur[place] Monreal [address] [place] [address] Ph #: [phone] Route: Oral  
 nebivolol (BYSTOLIC) 5 mg tablet 5 Sig: Take 1 Tab by mouth daily. Class: Normal  
 [facility] [address], Surinder Dominguez [address] [place] [address] Ph #: [phone] Route: Oral  
  
We Performed the Following INFLUENZA VIRUS VAC QUAD,SPLIT,PRESV FREE SYRINGE IM [id number] CPT(R)] Follow-up Instructions Return in about 6 months (around 5/20/2018) for follow up chronic conditions. Patient Instructions Influenza (Flu) Vaccine (Inactivated or Recombinant): What You Need to Know Why get vaccinated? Influenza (\"flu\") is a contagious disease that spreads around the [place] every winter, usually between October and May. Flu is caused by influenza viruses and is spread mainly by coughing, sneezing, and close contact. Anyone can get flu. Flu strikes suddenly and can last several days. Symptoms vary by age, but can include: · Fever/chills. · Sore throat. · Muscle aches. · Fatigue. · Cough. · Headache. · Runny or stuffy nose. Flu can also lead to pneumonia and blood infections, and cause diarrhea and seizures in children. If you have a medical condition, such as heart or lung disease, flu can make it worse. Flu is more dangerous for some people. Infants and young children, people 72years of age and older, pregnant women, and people with certain health conditions or a weakened immune system are at greatest risk. Each year thousands of people in the [facility] die from flu, and many more are hospitalized. Flu vaccine can: · Keep you from getting flu. · Make flu less severe if you do get it. · Keep you from spreading flu to your family and other people. Inactivated and recombinant flu vaccines A dose of flu vaccine is recommended every flu season. Children 6 months through 6years of age may need two doses during the same flu season. Everyone else needs only one dose each flu season. Some inactivated flu vaccines contain a very small amount of a mercury-based preservative called thimerosal. Studies have not shown thimerosal in vaccines to be harmful, but flu vaccines that do not contain thimerosal are available. There is no live flu virus in flu shots. They cannot cause the flu. There are many flu viruses, and they are always changing. Each year a new flu vaccine is made to protect against three or four viruses that are likely to cause disease in the upcoming flu season. But even when the vaccine doesn't exactly match these viruses, it may still provide some protection. Flu vaccine cannot prevent: · Flu that is caused by a virus not covered by the vaccine. · Illnesses that look like flu but are not. Some people should not get this vaccine Tell the person who is giving you the vaccine: · If you have any severe (life-threatening) allergies. If you ever had a life-threatening allergic reaction after a dose of flu vaccine, or have a severe allergy to any part of this vaccine, you may be advised not to get vaccinated. Most, but not all, types of flu vaccine contain a small amount of egg protein. · If you ever had Guillain-Barré syndrome (also called GBS) Some people with a history of GBS should not get this vaccine. This should be discussed with your doctor. · If you are not feeling well. It is usually okay to get flu vaccine when you have a mild illness, but you might be asked to come back when you feel better. Risks of a vaccine reaction With any medicine, including vaccines, there is a chance of reactions. These are usually mild and go away on their own, but serious reactions are also possible. Most people who get a flu shot do not have any problems with it. Minor problems following a flu shot include: · Soreness, redness, or swelling where the shot was given · Hoarseness · Sore, red or itchy eyes · Cough · Fever · Aches · Headache · Itching · Fatigue If these problems occur, they usually begin soon after the shot and last 1 or 2 days. More serious problems following a flu shot can include the following: · There may be a small increased risk of Guillain-Barré Syndrome (GBS) after inactivated flu vaccine. This risk has been estimated at 1 or 2 additional cases per million people vaccinated. This is much lower than the risk of severe complications from flu, which can be prevented by flu vaccine. · Carmina Galindo children who get the flu shot along with pneumococcal vaccine (PCV13) and/or DTaP vaccine at the same time might be slightly more likely to have a seizure caused by fever. Ask your doctor for more information. Tell your doctor if a child who is getting flu vaccine has ever had a seizure Problems that could happen after any injected vaccine: · People sometimes faint after a medical procedure, including vaccination. Sitting or lying down for about 15 minutes can help prevent fainting, and injuries caused by a fall. Tell your doctor if you feel dizzy, or have vision changes or ringing in the ears. · Some people get severe pain in the shoulder and have difficulty moving the arm where a shot was given. This happens very rarely. · Any medication can cause a severe allergic reaction. Such reactions from a vaccine are very rare, estimated at about 1 in a million doses, and would happen within a few minutes to a few hours after the vaccination. As with any medicine, there is a very remote chance of a vaccine causing a serious injury or death. The safety of vaccines is always being monitored. For more information, visit: www.cdc.gov/vaccinesafety/. What if there is a serious reaction? What should I look for? · Look for anything that concerns you, such as signs of a severe allergic reaction, very high fever, or unusual behavior.  
Signs of a severe allergic reaction can include hives, swelling of the disclaims liability or warranty for this information). If you have questions about a medical condition or this instruction, always ask your healthcare professional. [address] any warranty or liability for your use of this information. High Cholesterol: Care Instructions Your Care Instructions Cholesterol is a type of fat in your blood. It is needed for many body functions, such as making new cells. Cholesterol is made by your body. It also comes from food you eat. High cholesterol means that you have too much of the fat in your blood. This raises your risk of a heart attack and stroke. LDL and HDL are part of your total cholesterol. LDL is the \"bad\" cholesterol. High LDL can raise your risk for heart disease, heart attack, and stroke. HDL is the \"good\" cholesterol. It helps clear bad cholesterol from the body. High HDL is linked with a lower risk of heart disease, heart attack, and stroke. Your cholesterol levels help your doctor find out your risk for having a heart attack or stroke. You and your doctor can talk about whether you need to lower your risk and what treatment is best for you. A heart-healthy lifestyle along with medicines can help lower your cholesterol and your risk. The way you choose to lower your risk will depend on how high your risk is for heart attack and stroke. It will also depend on how you feel about taking medicines. Follow-up care is a key part of your treatment and safety. Be sure to make and go to all appointments, and call your doctor if you are having problems. It's also a good idea to know your test results and keep a list of the medicines you take. How can you care for yourself at home? · Eat a variety of foods every day. Good choices include fruits, vegetables, whole grains (like oatmeal), dried beans and peas, nuts and seeds, soy products (like tofu), and fat-free or low-fat dairy products. · Replace butter, margarine, and hydrogenated or partially hydrogenated oils with olive and canola oils. (Canola oil margarine without trans fat is fine.) · Replace red meat with fish, poultry, and soy protein (like tofu). · Limit processed and packaged foods like chips, crackers, and cookies. · Bake, broil, or steam foods. Don't sarkar them. · Be physically active. Get at least 30 minutes of exercise on most days of the week. Walking is a good choice. You also may want to do other activities, such as running, swimming, cycling, or playing tennis or team sports. · Stay at a healthy weight or lose weight by making the changes in eating and physical activity listed above. Losing just a small amount of weight, even 5 to 10 pounds, can reduce your risk for having a heart attack or stroke. · Do not smoke. When should you call for help? Watch closely for changes in your health, and be sure to contact your doctor if: 
? · You need help making lifestyle changes. ? · You have questions about your medicine. Where can you learn more? Go to http://jemmaEnviable Abodejavi.info/. Enter X785 in the search box to learn more about \"High Cholesterol: Care Instructions. \" Current as of: September 21, 2016 Content Version: 11.4 © 3426-5520 [employer]. Care instructions adapted under license by [employer] (which disclaims liability or warranty for this information). If you have questions about a medical condition or this instruction, always ask your healthcare professional. [place] [address] any warranty or liability for your use of this information. DASH Diet: Care Instructions Your Care Instructions The DASH diet is an eating plan that can help lower your blood pressure. DASH stands for Dietary Approaches to Stop Hypertension. Hypertension is high blood pressure.  
The DASH diet focuses on eating foods that are high in calcium, potassium, and magnesium. These nutrients can lower blood pressure. The foods that are highest in these nutrients are fruits, vegetables, low-fat dairy products, nuts, seeds, and legumes. But taking calcium, potassium, and magnesium supplements instead of eating foods that are high in those nutrients does not have the same effect. The DASH diet also includes whole grains, fish, and poultry. The DASH diet is one of several lifestyle changes your doctor may recommend to lower your high blood pressure. Your doctor may also want you to decrease the amount of sodium in your diet. Lowering sodium while following the DASH diet can lower blood pressure even further than just the DASH diet alone. Follow-up care is a key part of your treatment and safety. Be sure to make and go to all appointments, and call your doctor if you are having problems. It's also a good idea to know your test results and keep a list of the medicines you take. How can you care for yourself at home? Following the DASH diet · Eat 4 to 5 servings of fruit each day. A serving is 1 medium-sized piece of fruit, ½ cup chopped or canned fruit, 1/4 cup dried fruit, or 4 ounces (½ cup) of fruit juice. Choose fruit more often than fruit juice. · Eat 4 to 5 servings of vegetables each day. A serving is 1 cup of lettuce or raw leafy vegetables, ½ cup of chopped or cooked vegetables, or 4 ounces (½ cup) of vegetable juice. Choose vegetables more often than vegetable juice. · Get 2 to 3 servings of low-fat and fat-free dairy each day. A serving is 8 ounces of milk, 1 cup of yogurt, or 1 ½ ounces of cheese. · Eat 6 to 8 servings of grains each day. A serving is 1 slice of bread, 1 ounce of dry cereal, or ½ cup of cooked rice, pasta, or cooked cereal. Try to choose whole-grain products as much as possible. · Limit lean meat, poultry, and fish to 2 servings each day. A serving is 3 ounces, about the size of a deck of cards. · Eat 4 to 5 servings of nuts, seeds, and legumes (cooked dried beans, lentils, and split peas) each week. A serving is 1/3 cup of nuts, 2 tablespoons of seeds, or ½ cup of cooked beans or peas. · Limit fats and oils to 2 to 3 servings each day. A serving is 1 teaspoon of vegetable oil or 2 tablespoons of salad dressing. · Limit sweets and added sugars to 5 servings or less a week. A serving is 1 tablespoon jelly or jam, ½ cup sorbet, or 1 cup of lemonade. · Eat less than 2,300 milligrams (mg) of sodium a day. If you limit your sodium to 1,500 mg a day, you can lower your blood pressure even more. Tips for success · Start small. Do not try to make dramatic changes to your diet all at once. You might feel that you are missing out on your favorite foods and then be more likely to not follow the plan. Make small changes, and stick with them. Once those changes become habit, add a few more changes. · Try some of the following: ¨ Make it a goal to eat a fruit or vegetable at every meal and at snacks. This will make it easy to get the recommended amount of fruits and vegetables each day. ¨ Try yogurt topped with fruit and nuts for a snack or healthy dessert. ¨ Add lettuce, tomato, cucumber, and onion to sandwiches. ¨ Combine a ready-made pizza crust with low-fat mozzarella cheese and lots of vegetable toppings. Try using tomatoes, squash, spinach, broccoli, carrots, cauliflower, and onions. ¨ Have a variety of cut-up vegetables with a low-fat dip as an appetizer instead of chips and dip. ¨ Sprinkle sunflower seeds or chopped almonds over salads. Or try adding chopped walnuts or almonds to cooked vegetables. ¨ Try some vegetarian meals using beans and peas. Add garbanzo or kidney beans to salads. Make burritos and tacos with mashed higgins beans or black beans. Where can you learn more? Go to http://jemma-javi.info/.  
Enter Q375 in the search box to learn more about \"DASH Diet: Care Instructions. \" Current as of: September 21, 2016 Content Version: 11.4 © 9317-0860 [employer]. Care instructions adapted under license by [employer] (which disclaims liability or warranty for this information). If you have questions about a medical condition or this instruction, always ask your healthcare professional. [address] any warranty or liability for your use of this information. Introducing [facility]! Tariq Agudelo introduces AudioBeta patient portal. Now you can access parts of your medical record, email your doctor's office, and request medication refills online. 1. In your internet browser, go to [email or web address] 2. Click on the First Time User? Click Here link in the Sign In box. You will see the New Member Sign Up page. 3. Enter your AudioBeta Access Code exactly as it appears below. You will not need to use this code after youve completed the sign-up process. If you do not sign up before the expiration date, you must request a new code. · AudioBeta Access Code: [email or web address] Expires: 2/18/2018  9:30 AM 
 
4. Enter the last four digits of your Social Security Number (xxxx) and Date of Birth (mm/dd/yyyy) as indicated and click Submit. You will be taken to the next sign-up page. 5. Create a AudioBeta ID. This will be your AudioBeta login ID and cannot be changed, so think of one that is secure and easy to remember. 6. Create a AudioBeta password. You can change your password at any time. 7. Enter your Password Reset Question and Answer. This can be used at a later time if you forget your password. 8. Enter your e-mail address. You will receive e-mail notification when new information is available in [address] 9. Click Sign Up. You can now view and download portions of your medical record. 10. Click the Download Summary menu link to download a portable copy of your medical information. If you have questions, please visit the Frequently Asked Questions section of the [employer] website. Remember, [employer] is NOT to be used for urgent needs. For medical emergencies, dial 911. Now available from your iPhone and Android! Please provide this summary of care documentation to your next provider. Your primary care clinician is listed as June Dickerson. If you have any questions after today's visit, please call [phone].

## 2017-11-20 NOTE — PROGRESS NOTES
Chief Complaint   Patient presents with    Follow Up Chronic Condition       HPI:    This is a 61 y/o  patient who presents for follow up chronic condition    HTN: BP doing well on current medication. No headache or dizziness      Hyperlipidemia: patient states compliance taking medication as advised  Last lipids elevated:    Component Results   Component Value Flag Ref Range Units Status   Cholesterol, total 225 (H) 100 - 199 mg/dL Final   Triglyceride 127  0 - 149 mg/dL Final   HDL Cholesterol 40  >39 mg/dL Final   VLDL, calculated 25  5 - 40 mg/dL Final   LDL, calculated 160 (H) 0 - 99 mg/dL Final         CKD: last lab done 5/25/17 shows elevated Kidney function. Cr1.38 and GFR  sl. low at 54    Currently an every day smoker- 1 pack/day- not ready to quit at this time        ROS: Pt denies: Wt loss, Fever/Chills, HA, Visual changes, Fatigue, Chest pain, SOB, ALMANZAR, Abd pain, N/V/D/C, Blood in stool or urine, Edema. Pertinent positive as above in HPI. All others were negative         Past Medical History:   Diagnosis Date    Hypercholesterolemia     Hypertension        No past surgical history on file. No family history on file. Social History     Social History    Marital status:      Spouse name: N/A    Number of children: N/A    Years of education: N/A     Occupational History    Not on file.      Social History Main Topics    Smoking status: Current Every Day Smoker     Packs/day: 1.00     Types: Cigarettes    Smokeless tobacco: Never Used    Alcohol use Yes      Comment: OCCASSIONALLY    Drug use: No    Sexual activity: Yes     Partners: Female     Other Topics Concern    Not on file     Social History Narrative     Current Outpatient Prescriptions   Medication Sig Dispense Refill    fenofibrate nanocrystallized (TRICOR) 145 mg tablet take 1 tablet by mouth once daily 30 Tab 2    VITAMIN D2 50,000 unit capsule take 1 capsule by mouth every week 12 Cap 1    amLODIPine (NORVASC) 5 mg tablet take 1 tablet by mouth once daily 30 Tab 2    clopidogrel (PLAVIX) 75 mg tab take 1 tablet by mouth once daily 30 Tab 1    cetirizine (ZYRTEC) 10 mg tablet Take 1 Tab by mouth daily. 30 Tab 2    fluticasone (FLONASE) 50 mcg/actuation nasal spray 1 spray into each nostril daily 1 Bottle 6    Omega-3 Fatty Acids (FISH OIL) 500 mg cap Take  by mouth.  aspirin 81 mg chewable tablet Take 81 mg by mouth daily.  valsartan (DIOVAN) 320 mg tablet Take 1 Tab by mouth daily. 320 Tab 1    nebivolol (BYSTOLIC) 5 mg tablet Take 1 Tab by mouth daily. 30 Tab 1    buPROPion SR (WELLBUTRIN SR) 150 mg SR tablet Take 1 Tab by mouth two (2) times a day. 60 Tab 2    budesonide (RHINOCORT AQUA) 32 mcg/actuation nasal spray ! Spray into each nostril daily 1 Bottle 0    ascorbic acid, vitamin C, (VITAMIN C) 500 mg tablet Take  by mouth. Allergies   Allergen Reactions    Simvastatin Other (comments)         Physical Exam:    Vital Signs:   Visit Vitals    /68    Pulse 66    Temp 95.9 °F (35.5 °C) (Oral)    Resp 16    Ht 6' 2\" (1.88 m)    Wt 211 lb 9.6 oz (96 kg)    SpO2 99%    BMI 27.17 kg/m2         General: a, a & o x 3, afebrile, interacting appropriately, in no acute distress  HEENT: head NC/AT, conjuctiva clear  Respiratory: lung sounds clear bilaterally,  no wheezes or crackles  Cardiovascular: normal S1S2, regular rate and rhythm, no murmurs  Abdomen: non-distended, normal bowel sounds x 4 quadrants, soft, non-tender to palpation  Musculoskeletal: normal ROM on all joints, no swelling or deformity  Skin: warm and dry. No rash  Psychiatric: a, a & o x 3, appropriate mood and affect, no thought disorder       Assessment/Plan:        ICD-10-CM ICD-9-CM    1. Essential hypertension I10 401.9 amLODIPine (NORVASC) 5 mg tablet      clopidogrel (PLAVIX) 75 mg tab      valsartan (DIOVAN) 320 mg tablet      nebivolol (BYSTOLIC) 5 mg tablet      CBC WITH AUTOMATED DIFF   2.  Mixed hyperlipidemia E78.2 272.2 fenofibrate nanocrystallized (TRICOR) 145 mg tablet      LIPID PANEL      TSH 3RD GENERATION   3. Vitamin D insufficiency E55.9 268.9 CANCELED: VITAMIN D, 25 HYDROXY   4. CKD (chronic kidney disease) stage 3, GFR 30-59 ml/min R48.0 719.4 METABOLIC PANEL, COMPREHENSIVE   5. Overweight (BMI 25.0-29. 9) E66.3 278.02 HEMOGLOBIN A1C WITH EAG   6. Encounter for immunization Z23 V03.89 INFLUENZA VIRUS VAC QUAD,SPLIT,PRESV FREE SYRINGE IM   7. Cigarette nicotine dependence without complication P91.166 511.3 The patient was counseled on the dangers of tobacco use, and was advised to quit. Reviewed strategies to maximize success, including removing cigarettes and smoking materials from environment, stress management, substitution of other forms of reinforcement and support of family/friends. Additional Notes: Discussed today's diagnosis, treatment plans. Discussed medication indications and side effects. After Visit Summary: Provided and discussed printed patient instructions. Answered questions in relation to today's diagnosis.   Follow-up Disposition: 6 months              Heather Carrasco NP-BC  Family Medicine  [address]              Orders Placed This Encounter    INFLUENZA VIRUS VACCINE QUADRIVALENT, PRESERVATIVE FREE SYRINGE (18889)    CBC WITH AUTOMATED DIFF    LIPID PANEL    METABOLIC PANEL, COMPREHENSIVE    HEMOGLOBIN A1C WITH EAG    TSH 3RD GENERATION    CBC WITH AUTOMATED DIFF    METABOLIC PANEL, COMPREHENSIVE    LIPID PANEL    CVD REPORT    CKD REPORT    HEMOGLOBIN A1C WITH EAG    VITAMIN D, 25 HYDROXY    TSH 3RD GENERATION    fenofibrate nanocrystallized (TRICOR) 145 mg tablet    amLODIPine (NORVASC) 5 mg tablet    clopidogrel (PLAVIX) 75 mg tab    valsartan (DIOVAN) 320 mg tablet    nebivolol (BYSTOLIC) 5 mg tablet

## 2017-11-20 NOTE — PROGRESS NOTES
1. Have you been to the ER, urgent care clinic since your last visit? Hospitalized since your last visit? No    2. Have you seen or consulted any other health care providers outside of the [address] [place] [address] since your last visit? Include any pap smears or colon screening.  No

## 2017-11-21 LAB
25(OH)D3+25(OH)D2 SERPL-MCNC: 30.1 NG/ML (ref 30–100)
ALBUMIN SERPL-MCNC: 4.6 G/DL (ref 3.6–4.8)
ALBUMIN/GLOB SERPL: 1.7 {RATIO} (ref 1.2–2.2)
ALP SERPL-CCNC: 51 IU/L (ref 39–117)
ALT SERPL-CCNC: 8 IU/L (ref 0–44)
AST SERPL-CCNC: 22 IU/L (ref 0–40)
BASOPHILS # BLD AUTO: 0.1 X10E3/UL (ref 0–0.2)
BASOPHILS NFR BLD AUTO: 1 %
BILIRUB SERPL-MCNC: 0.4 MG/DL (ref 0–1.2)
BUN SERPL-MCNC: 50 MG/DL (ref 8–27)
BUN/CREAT SERPL: 26 (ref 10–24)
CALCIUM SERPL-MCNC: 9.7 MG/DL (ref 8.6–10.2)
CHLORIDE SERPL-SCNC: 101 MMOL/L (ref 96–106)
CHOLEST SERPL-MCNC: 242 MG/DL (ref 100–199)
CO2 SERPL-SCNC: 21 MMOL/L (ref 18–29)
CREAT SERPL-MCNC: 1.94 MG/DL (ref 0.76–1.27)
EOSINOPHIL # BLD AUTO: 0.1 X10E3/UL (ref 0–0.4)
EOSINOPHIL NFR BLD AUTO: 1 %
ERYTHROCYTE [DISTWIDTH] IN BLOOD BY AUTOMATED COUNT: 14.4 % (ref 12.3–15.4)
EST. AVERAGE GLUCOSE BLD GHB EST-MCNC: 114 MG/DL
GFR SERPLBLD CREATININE-BSD FMLA CKD-EPI: 36 ML/MIN/1.73
GFR SERPLBLD CREATININE-BSD FMLA CKD-EPI: 41 ML/MIN/1.73
GLOBULIN SER CALC-MCNC: 2.7 G/DL (ref 1.5–4.5)
GLUCOSE SERPL-MCNC: 88 MG/DL (ref 65–99)
HBA1C MFR BLD: 5.6 % (ref 4.8–5.6)
HCT VFR BLD AUTO: 47.4 % (ref 37.5–51)
HDLC SERPL-MCNC: 45 MG/DL
HGB BLD-MCNC: 15.8 G/DL (ref 12.6–17.7)
IMM GRANULOCYTES # BLD: 0 X10E3/UL (ref 0–0.1)
IMM GRANULOCYTES NFR BLD: 0 %
INTERPRETATION, 910389: NORMAL
INTERPRETATION: NORMAL
LDLC SERPL CALC-MCNC: 169 MG/DL (ref 0–99)
LYMPHOCYTES # BLD AUTO: 2.5 X10E3/UL (ref 0.7–3.1)
LYMPHOCYTES NFR BLD AUTO: 32 %
MCH RBC QN AUTO: 29.2 PG (ref 26.6–33)
MCHC RBC AUTO-ENTMCNC: 33.3 G/DL (ref 31.5–35.7)
MCV RBC AUTO: 88 FL (ref 79–97)
MONOCYTES # BLD AUTO: 0.7 X10E3/UL (ref 0.1–0.9)
MONOCYTES NFR BLD AUTO: 9 %
NEUTROPHILS # BLD AUTO: 4.5 X10E3/UL (ref 1.4–7)
NEUTROPHILS NFR BLD AUTO: 57 %
PDF IMAGE, 910387: NORMAL
PLATELET # BLD AUTO: 266 X10E3/UL (ref 150–379)
POTASSIUM SERPL-SCNC: 5.5 MMOL/L (ref 3.5–5.2)
PROT SERPL-MCNC: 7.3 G/DL (ref 6–8.5)
RBC # BLD AUTO: 5.42 X10E6/UL (ref 4.14–5.8)
SODIUM SERPL-SCNC: 140 MMOL/L (ref 134–144)
TRIGL SERPL-MCNC: 140 MG/DL (ref 0–149)
TSH SERPL DL<=0.005 MIU/L-ACNC: 2.53 UIU/ML (ref 0.45–4.5)
VLDLC SERPL CALC-MCNC: 28 MG/DL (ref 5–40)
WBC # BLD AUTO: 7.9 X10E3/UL (ref 3.4–10.8)

## 2017-11-22 NOTE — PROGRESS NOTES
Abnormal kidney function, elevated Potassium and Lipids. Please advise patient to return in 1 week for repeat kidney function. If still elevated will ref to Nephrology. Continue cholesterol med.  Will discuss at his next visit

## 2017-11-22 NOTE — PROGRESS NOTES
Patient notified of lab results, two patient identifiers noted; Patient scheduled to come in on 11/28/2017 for repeat labs, encouraged patient to keep next follow up appointment.

## 2017-11-28 DIAGNOSIS — R79.89 CREATININE ELEVATION: Primary | ICD-10-CM

## 2017-11-28 DIAGNOSIS — E87.5 HYPERKALEMIA: ICD-10-CM

## 2017-11-29 LAB
ALBUMIN SERPL-MCNC: 4.4 G/DL (ref 3.6–4.8)
ALBUMIN/GLOB SERPL: 1.8 {RATIO} (ref 1.2–2.2)
ALP SERPL-CCNC: 50 IU/L (ref 39–117)
ALT SERPL-CCNC: 12 IU/L (ref 0–44)
AST SERPL-CCNC: 19 IU/L (ref 0–40)
BILIRUB SERPL-MCNC: 0.3 MG/DL (ref 0–1.2)
BUN SERPL-MCNC: 42 MG/DL (ref 8–27)
BUN/CREAT SERPL: 22 (ref 10–24)
CALCIUM SERPL-MCNC: 9.7 MG/DL (ref 8.6–10.2)
CHLORIDE SERPL-SCNC: 104 MMOL/L (ref 96–106)
CO2 SERPL-SCNC: 18 MMOL/L (ref 18–29)
CREAT SERPL-MCNC: 1.91 MG/DL (ref 0.76–1.27)
GFR SERPLBLD CREATININE-BSD FMLA CKD-EPI: 36 ML/MIN/1.73
GFR SERPLBLD CREATININE-BSD FMLA CKD-EPI: 42 ML/MIN/1.73
GLOBULIN SER CALC-MCNC: 2.5 G/DL (ref 1.5–4.5)
GLUCOSE SERPL-MCNC: 80 MG/DL (ref 65–99)
INTERPRETATION: NORMAL
POTASSIUM SERPL-SCNC: 5.5 MMOL/L (ref 3.5–5.2)
PROT SERPL-MCNC: 6.9 G/DL (ref 6–8.5)
SODIUM SERPL-SCNC: 141 MMOL/L (ref 134–144)

## 2017-12-11 NOTE — PROGRESS NOTES
Please let pt know- kidney function still low but stable from last lab. Advise pt to come for repeat lab in 1 month.  If no improvement then will refer to nephrology for chronic kidney disease stage

## 2018-01-25 DIAGNOSIS — N18.30 CKD (CHRONIC KIDNEY DISEASE) STAGE 3, GFR 30-59 ML/MIN (HCC): Primary | ICD-10-CM

## 2018-01-26 LAB
ALBUMIN SERPL-MCNC: 4.2 G/DL (ref 3.6–4.8)
ALBUMIN/GLOB SERPL: 1.5 {RATIO} (ref 1.2–2.2)
ALP SERPL-CCNC: 48 IU/L (ref 39–117)
ALT SERPL-CCNC: 14 IU/L (ref 0–44)
AST SERPL-CCNC: 21 IU/L (ref 0–40)
BILIRUB SERPL-MCNC: 0.5 MG/DL (ref 0–1.2)
BUN SERPL-MCNC: 31 MG/DL (ref 8–27)
BUN/CREAT SERPL: 17 (ref 10–24)
CALCIUM SERPL-MCNC: 9.6 MG/DL (ref 8.6–10.2)
CHLORIDE SERPL-SCNC: 102 MMOL/L (ref 96–106)
CO2 SERPL-SCNC: 20 MMOL/L (ref 18–29)
CREAT SERPL-MCNC: 1.81 MG/DL (ref 0.76–1.27)
GFR SERPLBLD CREATININE-BSD FMLA CKD-EPI: 39 ML/MIN/1.73
GFR SERPLBLD CREATININE-BSD FMLA CKD-EPI: 45 ML/MIN/1.73
GLOBULIN SER CALC-MCNC: 2.8 G/DL (ref 1.5–4.5)
GLUCOSE SERPL-MCNC: 91 MG/DL (ref 65–99)
INTERPRETATION: NORMAL
POTASSIUM SERPL-SCNC: 4.2 MMOL/L (ref 3.5–5.2)
PROT SERPL-MCNC: 7 G/DL (ref 6–8.5)
SODIUM SERPL-SCNC: 141 MMOL/L (ref 134–144)

## 2018-01-30 DIAGNOSIS — N18.30 CKD (CHRONIC KIDNEY DISEASE) STAGE 3, GFR 30-59 ML/MIN (HCC): Primary | ICD-10-CM

## 2018-01-30 NOTE — PROGRESS NOTES
Liver function sli improved but still elevated- refrral to nephrology place .  pls schedule and notify patient

## 2018-02-12 ENCOUNTER — OFFICE VISIT (OUTPATIENT)
Dept: FAMILY MEDICINE CLINIC | Age: 63
End: 2018-02-12

## 2018-02-12 VITALS
WEIGHT: 209 LBS | BODY MASS INDEX: 26.82 KG/M2 | HEART RATE: 63 BPM | RESPIRATION RATE: 18 BRPM | TEMPERATURE: 96.7 F | DIASTOLIC BLOOD PRESSURE: 56 MMHG | HEIGHT: 74 IN | SYSTOLIC BLOOD PRESSURE: 117 MMHG

## 2018-02-12 DIAGNOSIS — K52.9 GASTROENTERITIS: Primary | ICD-10-CM

## 2018-02-12 NOTE — PATIENT INSTRUCTIONS
Gastroenteritis: Care Instructions  Your Care Instructions    Gastroenteritis is an illness that may cause nausea, vomiting, and diarrhea. It is sometimes called \"stomach flu. \" It can be caused by bacteria or a virus. You will probably begin to feel better in 1 to 2 days. In the meantime, get plenty of rest and make sure you do not become dehydrated. Dehydration occurs when your body loses too much fluid. Follow-up care is a key part of your treatment and safety. Be sure to make and go to all appointments, and call your doctor if you are having problems. It's also a good idea to know your test results and keep a list of the medicines you take. How can you care for yourself at home? · If your doctor prescribed antibiotics, take them as directed. Do not stop taking them just because you feel better. You need to take the full course of antibiotics. · Drink plenty of fluids to prevent dehydration, enough so that your urine is light yellow or clear like water. Choose water and other caffeine-free clear liquids until you feel better. If you have kidney, heart, or liver disease and have to limit fluids, talk with your doctor before you increase your fluid intake. · Drink fluids slowly, in frequent, small amounts, because drinking too much too fast can cause vomiting. · Begin eating mild foods, such as dry toast, yogurt, applesauce, bananas, and rice. Avoid spicy, hot, or high-fat foods, and do not drink alcohol or caffeine for a day or two. Do not drink milk or eat ice cream until you are feeling better. How to prevent gastroenteritis  · Keep hot foods hot and cold foods cold. · Do not eat meats, dressings, salads, or other foods that have been kept at room temperature for more than 2 hours. · Use a thermometer to check your refrigerator. It should be between 34°F and 40°F.  · Defrost meats in the refrigerator or microwave, not on the kitchen counter. · Keep your hands and your kitchen clean.  Wash your hands, cutting boards, and countertops with hot soapy water frequently. · Cook meat until it is well done. · Do not eat raw eggs or uncooked sauces made with raw eggs. · Do not take chances. If food looks or tastes spoiled, throw it out. When should you call for help? Call 911 anytime you think you may need emergency care. For example, call if:  ? · You vomit blood or what looks like coffee grounds. ? · You passed out (lost consciousness). ? · You pass maroon or very bloody stools. ?Call your doctor now or seek immediate medical care if:  ? · You have severe belly pain. ? · You have signs of needing more fluids. You have sunken eyes, a dry mouth, and pass only a little dark urine. ? · You feel like you are going to faint. ? · You have increased belly pain that does not go away in 1 to 2 days. ? · You have new or increased nausea, or you are vomiting. ? · You have a new or higher fever. ? · Your stools are black and tarlike or have streaks of blood. ? Watch closely for changes in your health, and be sure to contact your doctor if:  ? · You are dizzy or lightheaded. ? · You urinate less than usual, or your urine is dark yellow or brown. ? · You do not feel better with each day that goes by. Where can you learn more? Go to http://jemma-javi.info/. Enter N142 in the search box to learn more about \"Gastroenteritis: Care Instructions. \"  Current as of: March 3, 2017  Content Version: 11.4  © 6346-8198 [employer]. Care instructions adapted under license by [employer] (which disclaims liability or warranty for this information). If you have questions about a medical condition or this instruction, always ask your healthcare professional. [address] any warranty or liability for your use of this information.        Gastroenteritis: Care Instructions  Your Care Instructions    Gastroenteritis is an illness that may cause nausea, vomiting, and diarrhea. It is sometimes called \"stomach flu. \" It can be caused by bacteria or a virus. You will probably begin to feel better in 1 to 2 days. In the meantime, get plenty of rest and make sure you do not become dehydrated. Dehydration occurs when your body loses too much fluid. Follow-up care is a key part of your treatment and safety. Be sure to make and go to all appointments, and call your doctor if you are having problems. It's also a good idea to know your test results and keep a list of the medicines you take. How can you care for yourself at home? · If your doctor prescribed antibiotics, take them as directed. Do not stop taking them just because you feel better. You need to take the full course of antibiotics. · Drink plenty of fluids to prevent dehydration, enough so that your urine is light yellow or clear like water. Choose water and other caffeine-free clear liquids until you feel better. If you have kidney, heart, or liver disease and have to limit fluids, talk with your doctor before you increase your fluid intake. · Drink fluids slowly, in frequent, small amounts, because drinking too much too fast can cause vomiting. · Begin eating mild foods, such as dry toast, yogurt, applesauce, bananas, and rice. Avoid spicy, hot, or high-fat foods, and do not drink alcohol or caffeine for a day or two. Do not drink milk or eat ice cream until you are feeling better. How to prevent gastroenteritis  · Keep hot foods hot and cold foods cold. · Do not eat meats, dressings, salads, or other foods that have been kept at room temperature for more than 2 hours. · Use a thermometer to check your refrigerator. It should be between 34°F and 40°F.  · Defrost meats in the refrigerator or microwave, not on the kitchen counter. · Keep your hands and your kitchen clean. Wash your hands, cutting boards, and countertops with hot soapy water frequently. · Cook meat until it is well done.   · Do not eat raw eggs or uncooked sauces made with raw eggs. · Do not take chances. If food looks or tastes spoiled, throw it out. When should you call for help? Call 911 anytime you think you may need emergency care. For example, call if:  ? · You vomit blood or what looks like coffee grounds. ? · You passed out (lost consciousness). ? · You pass maroon or very bloody stools. ?Call your doctor now or seek immediate medical care if:  ? · You have severe belly pain. ? · You have signs of needing more fluids. You have sunken eyes, a dry mouth, and pass only a little dark urine. ? · You feel like you are going to faint. ? · You have increased belly pain that does not go away in 1 to 2 days. ? · You have new or increased nausea, or you are vomiting. ? · You have a new or higher fever. ? · Your stools are black and tarlike or have streaks of blood. ? Watch closely for changes in your health, and be sure to contact your doctor if:  ? · You are dizzy or lightheaded. ? · You urinate less than usual, or your urine is dark yellow or brown. ? · You do not feel better with each day that goes by. Where can you learn more? Go to http://jemma-javi.info/. Enter N142 in the search box to learn more about \"Gastroenteritis: Care Instructions. \"  Current as of: March 3, 2017  Content Version: 11.4  © 8678-2546 [employer]. Care instructions adapted under license by [employer] (which disclaims liability or warranty for this information). If you have questions about a medical condition or this instruction, always ask your healthcare professional. [place] [address] any warranty or liability for your use of this information.

## 2018-02-12 NOTE — PROGRESS NOTES
1. Have you been to the ER, urgent care clinic since your last visit? Hospitalized since your last visit? No    2. Have you seen or consulted any other health care providers outside of the [address] [place] [address] since your last visit? Include any pap smears or colon screening.  No

## 2018-02-12 NOTE — MR AVS SNAPSHOT
[facility] [address] [facility] [place] [id number] 
[phone] Patient: Richard Polo MRN: [id number] JM Visit Information Date & Time Provider Department Dept. Phone Encounter #  
 2018 12:30 PM Iwona De Guzman NP [address] [facility]  Follow-up Instructions Return if symptoms worsen or fail to improve. Your Appointments 2018  8:00 AM  
Follow Up with Iwona De Guzman NP  
[place] [address] ([address] [facility]) Appt Note: 6 MONTH FU APPT [facility] [place]  
  
   
 [address] [place] [address] [address] Upcoming Health Maintenance Date Due COLONOSCOPY 2018 DTaP/Tdap/Td series (2 - Td) 2023 Allergies as of 2018  Review Complete On: 2018 By: [place] Perez LPN Severity Noted Reaction Type Reactions Simvastatin Medium 2015    Other (comments) Current Immunizations  Reviewed on 2017 Name Date Influenza Vaccine 10/24/2013 12:00 AM  
 Influenza Vaccine (Quad) PF 2017 Pneumococcal Polysaccharide (PPSV-23) 2014 12:00 AM  
 Zoster Vaccine, Live 2015 12:00 AM  
  
 Not reviewed this visit You Were Diagnosed With   
  
 Codes Comments Gastroenteritis    -  Primary ICD-10-CM: K52.9 ICD-9-CM: 558. 9 Vitals BP Pulse Temp Resp Height(growth percentile) Weight(growth percentile) 117/56 63 96.7 °F (35.9 °C) (Oral) 18 6' 2\" (1.88 m) 209 lb (94.8 kg) BMI Smoking Status 26.83 kg/m2 Current Every Day Smoker Vitals History BMI and BSA Data Body Mass Index Body Surface Area  
 26.83 kg/m 2 2.22 m 2 Preferred Pharmacy Pharmacy Name Phone [facility] [address], [place] -  [address] [place] [address] [phone] Your Updated Medication List  
  
   
 This list is accurate as of: 2/12/18  1:22 PM.  Always use your most recent med list. amLODIPine 5 mg tablet Commonly known as:  NORVASC  
take 1 tablet by mouth once daily  
  
 aspirin 81 mg chewable tablet Take 81 mg by mouth daily. cetirizine 10 mg tablet Commonly known as:  ZYRTEC Take 1 Tab by mouth daily. clopidogrel 75 mg Tab Commonly known as:  PLAVIX Take 1 Tab by mouth daily. fenofibrate nanocrystallized 145 mg tablet Commonly known as:  TRICOR  
take 1 tablet by mouth once daily FISH  mg Cap Generic drug:  Omega-3 Fatty Acids Take  by mouth. fluticasone 50 mcg/actuation nasal spray Commonly known as:  FLONASE  
1 spray into each nostril daily  
  
 nebivolol 5 mg tablet Commonly known as:  BYSTOLIC Take 1 Tab by mouth daily. valsartan 320 mg tablet Commonly known as:  DIOVAN Take 1 Tab by mouth daily. VITAMIN C 500 mg tablet Generic drug:  ascorbic acid (vitamin C) Take  by mouth. VITAMIN D2 50,000 unit capsule Generic drug:  ergocalciferol  
take 1 capsule by mouth every week Follow-up Instructions Return if symptoms worsen or fail to improve. Patient Instructions Gastroenteritis: Care Instructions Your Care Instructions Gastroenteritis is an illness that may cause nausea, vomiting, and diarrhea. It is sometimes called \"stomach flu. \" It can be caused by bacteria or a virus. You will probably begin to feel better in 1 to 2 days. In the meantime, get plenty of rest and make sure you do not become dehydrated. Dehydration occurs when your body loses too much fluid. Follow-up care is a key part of your treatment and safety. Be sure to make and go to all appointments, and call your doctor if you are having problems. It's also a good idea to know your test results and keep a list of the medicines you take. How can you care for yourself at home? · If your doctor prescribed antibiotics, take them as directed. Do not stop taking them just because you feel better. You need to take the full course of antibiotics. · Drink plenty of fluids to prevent dehydration, enough so that your urine is light yellow or clear like water. Choose water and other caffeine-free clear liquids until you feel better. If you have kidney, heart, or liver disease and have to limit fluids, talk with your doctor before you increase your fluid intake. · Drink fluids slowly, in frequent, small amounts, because drinking too much too fast can cause vomiting. · Begin eating mild foods, such as dry toast, yogurt, applesauce, bananas, and rice. Avoid spicy, hot, or high-fat foods, and do not drink alcohol or caffeine for a day or two. Do not drink milk or eat ice cream until you are feeling better. How to prevent gastroenteritis · Keep hot foods hot and cold foods cold. · Do not eat meats, dressings, salads, or other foods that have been kept at room temperature for more than 2 hours. · Use a thermometer to check your refrigerator. It should be between 34°F and 40°F. 
· Defrost meats in the refrigerator or microwave, not on the kitchen counter. · Keep your hands and your kitchen clean. Wash your hands, cutting boards, and countertops with hot soapy water frequently. · Cook meat until it is well done. · Do not eat raw eggs or uncooked sauces made with raw eggs. · Do not take chances. If food looks or tastes spoiled, throw it out. When should you call for help? Call 911 anytime you think you may need emergency care. For example, call if: 
? · You vomit blood or what looks like coffee grounds. ? · You passed out (lost consciousness). ? · You pass maroon or very bloody stools. ?Call your doctor now or seek immediate medical care if: 
? · You have severe belly pain. ? · You have signs of needing more fluids. You have sunken eyes, a dry mouth, and pass only a little dark urine. ? · You feel like you are going to faint. ? · You have increased belly pain that does not go away in 1 to 2 days. ? · You have new or increased nausea, or you are vomiting. ? · You have a new or higher fever. ? · Your stools are black and tarlike or have streaks of blood. ? Watch closely for changes in your health, and be sure to contact your doctor if: 
? · You are dizzy or lightheaded. ? · You urinate less than usual, or your urine is dark yellow or brown. ? · You do not feel better with each day that goes by. Where can you learn more? Go to http://jemma-javi.info/. Enter N142 in the search box to learn more about \"Gastroenteritis: Care Instructions. \" Current as of: March 3, 2017 Content Version: 11.4 © 4752-8363 [employer]. Care instructions adapted under license by [employer] (which disclaims liability or warranty for this information). If you have questions about a medical condition or this instruction, always ask your healthcare professional. [place] [address] any warranty or liability for your use of this information. Gastroenteritis: Care Instructions Your Care Instructions Gastroenteritis is an illness that may cause nausea, vomiting, and diarrhea. It is sometimes called \"stomach flu. \" It can be caused by bacteria or a virus. You will probably begin to feel better in 1 to 2 days. In the meantime, get plenty of rest and make sure you do not become dehydrated. Dehydration occurs when your body loses too much fluid. Follow-up care is a key part of your treatment and safety. Be sure to make and go to all appointments, and call your doctor if you are having problems. It's also a good idea to know your test results and keep a list of the medicines you take. How can you care for yourself at home? · If your doctor prescribed antibiotics, take them as directed.  Do not stop taking them just because you feel better. You need to take the full course of antibiotics. · Drink plenty of fluids to prevent dehydration, enough so that your urine is light yellow or clear like water. Choose water and other caffeine-free clear liquids until you feel better. If you have kidney, heart, or liver disease and have to limit fluids, talk with your doctor before you increase your fluid intake. · Drink fluids slowly, in frequent, small amounts, because drinking too much too fast can cause vomiting. · Begin eating mild foods, such as dry toast, yogurt, applesauce, bananas, and rice. Avoid spicy, hot, or high-fat foods, and do not drink alcohol or caffeine for a day or two. Do not drink milk or eat ice cream until you are feeling better. How to prevent gastroenteritis · Keep hot foods hot and cold foods cold. · Do not eat meats, dressings, salads, or other foods that have been kept at room temperature for more than 2 hours. · Use a thermometer to check your refrigerator. It should be between 34°F and 40°F. 
· Defrost meats in the refrigerator or microwave, not on the kitchen counter. · Keep your hands and your kitchen clean. Wash your hands, cutting boards, and countertops with hot soapy water frequently. · Cook meat until it is well done. · Do not eat raw eggs or uncooked sauces made with raw eggs. · Do not take chances. If food looks or tastes spoiled, throw it out. When should you call for help? Call 911 anytime you think you may need emergency care. For example, call if: 
? · You vomit blood or what looks like coffee grounds. ? · You passed out (lost consciousness). ? · You pass maroon or very bloody stools. ?Call your doctor now or seek immediate medical care if: 
? · You have severe belly pain. ? · You have signs of needing more fluids. You have sunken eyes, a dry mouth, and pass only a little dark urine. ? · You feel like you are going to faint. ? · You have increased belly pain that does not go away in 1 to 2 days. ? · You have new or increased nausea, or you are vomiting. ? · You have a new or higher fever. ? · Your stools are black and tarlike or have streaks of blood. ? Watch closely for changes in your health, and be sure to contact your doctor if: 
? · You are dizzy or lightheaded. ? · You urinate less than usual, or your urine is dark yellow or brown. ? · You do not feel better with each day that goes by. Where can you learn more? Go to http://jemma-javi.info/. Enter N142 in the search box to learn more about \"Gastroenteritis: Care Instructions. \" Current as of: March 3, 2017 Content Version: 11.4 © 4135-6653 [employer]. Care instructions adapted under license by [employer] (which disclaims liability or warranty for this information). If you have questions about a medical condition or this instruction, always ask your healthcare professional. [place] [address] any warranty or liability for your use of this information. Introducing [facility]! [facility] introduces Octro patient portal. Now you can access parts of your medical record, email your doctor's office, and request medication refills online. 1. In your internet browser, go to [email or web address] About [email or web address]. 2. Click on the First Time User? Click Here link in the Sign In box. You will see the New Member Sign Up page. 3. Enter your Octro Access Code exactly as it appears below. You will not need to use this code after youve completed the sign-up process. If you do not sign up before the expiration date, you must request a new code. · Octro Access Code: [email or web address] Expires: 2/18/2018  9:30 AM 
 
4. Enter the last four digits of your Social Security Number (xxxx) and Date of Birth (mm/dd/yyyy) as indicated and click Submit. You will be taken to the next sign-up page. 5. Create a [email or web address] ID. This will be your [email or web address] login ID and cannot be changed, so think of one that is secure and easy to remember. 6. Create a [email or web address] password. You can change your password at any time. 7. Enter your Password Reset Question and Answer. This can be used at a later time if you forget your password. 8. Enter your e-mail address. You will receive e-mail notification when new information is available in [address] 9. Click Sign Up. You can now view and download portions of your medical record. 10. Click the Download Summary menu link to download a portable copy of your medical information. If you have questions, please visit the Frequently Asked Questions section of the [email or web address] website. Remember, [email or web address] is NOT to be used for urgent needs. For medical emergencies, dial 911. Now available from your iPhone and Android! Please provide this summary of care documentation to your next provider. Your primary care clinician is listed as Quinton Reasoner. If you have any questions after today's visit, please call [phone].

## 2018-02-26 ENCOUNTER — OFFICE VISIT (OUTPATIENT)
Dept: FAMILY MEDICINE CLINIC | Age: 63
End: 2018-02-26

## 2018-02-26 VITALS
RESPIRATION RATE: 18 BRPM | HEART RATE: 60 BPM | BODY MASS INDEX: 26.82 KG/M2 | SYSTOLIC BLOOD PRESSURE: 112 MMHG | TEMPERATURE: 95.7 F | HEIGHT: 74 IN | DIASTOLIC BLOOD PRESSURE: 70 MMHG | WEIGHT: 209 LBS

## 2018-02-26 DIAGNOSIS — E78.2 MIXED HYPERLIPIDEMIA: ICD-10-CM

## 2018-02-26 DIAGNOSIS — F17.200 HEAVY SMOKER: ICD-10-CM

## 2018-02-26 DIAGNOSIS — R10.84 GENERALIZED ABDOMINAL PAIN: ICD-10-CM

## 2018-02-26 DIAGNOSIS — G89.29 CHRONIC BILATERAL LOW BACK PAIN WITH BILATERAL SCIATICA: ICD-10-CM

## 2018-02-26 DIAGNOSIS — I10 ESSENTIAL HYPERTENSION: Primary | ICD-10-CM

## 2018-02-26 DIAGNOSIS — M54.42 CHRONIC BILATERAL LOW BACK PAIN WITH BILATERAL SCIATICA: ICD-10-CM

## 2018-02-26 DIAGNOSIS — M54.41 CHRONIC BILATERAL LOW BACK PAIN WITH BILATERAL SCIATICA: ICD-10-CM

## 2018-02-26 NOTE — MR AVS SNAPSHOT
Beverly Herrera [place] [address] [facility] [place] [id number] 
[phone] Patient: Dayanara Laura MRN: [id number] ANIBAL:[id number] Visit Information Date & Time Provider Department Dept. Phone Encounter #  
 2/26/2018  9:00 AM Gordon Yapimmanuelsloan 13 [id number] Follow-up Instructions Return in about 2 weeks (around 3/12/2018) for chronic conditions. Your Appointments 5/8/2018  8:00 AM  
Follow Up with LIZETT Yap 23 ([address] [facility]) Appt Note: 6 MONTH FU APPT [facility] [place] [facility] [address]  
  
   
 [address]  [address] Upcoming Health Maintenance Date Due COLONOSCOPY 5/30/2018 DTaP/Tdap/Td series (2 - Td) 2/12/2023 Allergies as of 2/26/2018  Review Complete On: 2/26/2018 By: Mike Fung LPN Severity Noted Reaction Type Reactions Simvastatin Medium 05/07/2015    Other (comments) Current Immunizations  Reviewed on 7/24/2017 Name Date Influenza Vaccine 10/24/2013 12:00 AM  
 Influenza Vaccine (Quad) PF 11/20/2017 Pneumococcal Polysaccharide (PPSV-23) 1/16/2014 12:00 AM  
 Zoster Vaccine, Live 2/16/2015 12:00 AM  
  
 Not reviewed this visit You Were Diagnosed With   
  
 Codes Comments Essential hypertension    -  Primary ICD-10-CM: I10 
ICD-9-CM: 401.9 Mixed hyperlipidemia     ICD-10-CM: E78.2 ICD-9-CM: 272.2 Generalized abdominal pain     ICD-10-CM: R10.84 ICD-9-CM: 789.07 Chronic bilateral low back pain with bilateral sciatica     ICD-10-CM: M54.42, M54.41, G89.29 ICD-9-CM: 724.2, 724.3, 338.29 Heavy smoker     ICD-10-CM: F17.200 ICD-9-CM: 305.1 Vitals BP Pulse Temp Resp Height(growth percentile) Weight(growth percentile) 112/70 60 95.7 °F (35.4 °C) (Oral) 18 6' 2\" (1.88 m) 209 lb (94.8 kg) BMI Smoking Status 26.83 kg/m2 Current Every Day Smoker Vitals History BMI and BSA Data Body Mass Index Body Surface Area  
 26.83 kg/m 2 2.22 m 2 Preferred Pharmacy Pharmacy Name Phone [id number], [place] [address] [facility] [phone] Your Updated Medication List  
  
   
This list is accurate as of 2/26/18  9:28 AM.  Always use your most recent med list. amLODIPine 5 mg tablet Commonly known as:  NORVASC  
take 1 tablet by mouth once daily  
  
 aspirin 81 mg chewable tablet Take 81 mg by mouth daily. cetirizine 10 mg tablet Commonly known as:  ZYRTEC Take 1 Tab by mouth daily. clopidogrel 75 mg Tab Commonly known as:  PLAVIX Take 1 Tab by mouth daily. fenofibrate nanocrystallized 145 mg tablet Commonly known as:  TRICOR  
take 1 tablet by mouth once daily FISH  mg Cap Generic drug:  Omega-3 Fatty Acids Take  by mouth. fluticasone 50 mcg/actuation nasal spray Commonly known as:  FLONASE  
1 spray into each nostril daily  
  
 nebivolol 5 mg tablet Commonly known as:  BYSTOLIC Take 1 Tab by mouth daily. valsartan 320 mg tablet Commonly known as:  DIOVAN Take 1 Tab by mouth daily. VITAMIN C 500 mg tablet Generic drug:  ascorbic acid (vitamin C) Take  by mouth. VITAMIN D2 50,000 unit capsule Generic drug:  ergocalciferol  
take 1 capsule by mouth every week Follow-up Instructions Return in about 2 weeks (around 3/12/2018) for chronic conditions. To-Do List   
 03/01/2018 Imaging:  US EXAM SCREENING AAA Patient Instructions Learning About Benefits From Quitting Smoking How does quitting smoking make you healthier? If you're thinking about quitting smoking, you may have a few reasons to be smoke-free. Your health may be one of them.  
· When you quit smoking, you lower your risks for cancer, lung disease, heart attack, stroke, blood vessel disease, and blindness from macular degeneration. · When you're smoke-free, you get sick less often, and you heal faster. You are less likely to get colds, flu, bronchitis, and pneumonia. · As a nonsmoker, you may find that your mood is better and you are less stressed. When and how will you feel healthier? Quitting has real health benefits that start from day 1 of being smoke-free. And the longer you stay smoke-free, the healthier you get and the better you feel. The first hours · After just 20 minutes, your blood pressure and heart rate go down. That means there's less stress on your heart and blood vessels. · Within 12 hours, the level of carbon monoxide in your blood drops back to normal. That makes room for more oxygen. With more oxygen in your body, you may notice that you have more energy than when you smoked. After 2 weeks · Your lungs start to work better. · Your risk of heart attack starts to drop. After 1 month · When your lungs are clear, you cough less and breathe deeper, so it's easier to be active. · Your sense of taste and smell return. That means you can enjoy food more than you have since you started smoking. Over the years · After 1 year, your risk of heart disease is half what it would be if you kept smoking. · After 5 years, your risk of stroke starts to shrink. Within a few years after that, it's about the same as if you'd never smoked. · After 10 years, your risk of dying from lung cancer is cut by about half. And your risk for many other types of cancer is lower too. How would quitting help others in your life? When you quit smoking, you improve the health of everyone who now breathes in your smoke. · Their heart, lung, and cancer risks drop, much like yours. · They are sick less. For babies and small children, living smoke-free means they're less likely to have ear infections, pneumonia, and bronchitis. · If you're a woman who is or will be pregnant someday, quitting smoking means a healthier . · Children who are close to you are less likely to become adult smokers. Where can you learn more? Go to http://jemma-javi.info/. Enter [id number] [id number] in the search box to learn more about \"Learning About Benefits From Quitting Smoking. \" Current as of: 2017 Content Version: 11.4 © 1292-5263 TriOviz. Care instructions adapted under license by [employer] (which disclaims liability or warranty for this information). If you have questions about a medical condition or this instruction, always ask your healthcare professional. [place] [address] any warranty or liability for your use of this information. Stopping Smokeless Tobacco Use: Care Instructions Your Care Instructions Smokeless tobacco comes in many forms, such as snuff and chewing tobacco: · Snuff is finely ground tobacco sold in cans or pouches. Most of the time, snuff is used by putting a \"pinch\" or \"dip\" between the lower lip or cheek and the gum. · Chewing tobacco is sold as loose leaves, plugs, or twists. It is chewed or placed between the cheek and the gum or teeth. There are plenty of reasons to stop using smokeless tobacco. These products are harmful. They are not risk-free alternatives to smoking. Smokeless tobacco contains nicotine, which is addicting. Though using smokeless tobacco is less harmful than smoking cigarettes, it can cause serious health problems, such as: 
· White patches or red sores in your mouth that can turn into mouth cancer involving the lip, tongue, or cheek. · Tooth loss and other dental problems. · Gum disease. Your gums may pull away from your teeth and not grow back. People who use smokeless tobacco crave the nicotine in it. Giving up smokeless tobacco is much harder than simply changing a habit.  Your body has to stop craving the nicotine. It is hard to quit, but you can do it. Many tools are available for people who want to quit using smokeless tobacco. You may find that combining tools works best for you. There are several steps to quitting. First you get ready to quit. Then you get support to help you. After that, you learn new skills and behaviors to quit. For many people, a necessary step is getting and using medicine. Your doctor will help you set up the plan that best meets your needs. You may want to attend a tobacco cessation program. When you choose a program, look for one that has proven success. Ask your doctor for ideas. You will greatly increase your chances of success if you take medicine as well as get counseling or join a cessation program. 
Some of the changes you feel when you first quit smokeless tobacco are uncomfortable. Your body will miss the nicotine at first, and you may feel short-tempered and grumpy. You may have trouble sleeping or concentrating. Medicine can help you deal with these symptoms. You may struggle with changing your habits and rituals. The last step is the tricky one: Be prepared for the urge to use smokeless tobacco to continue for a time. This is a lot to deal with, but keep at it. You will feel better. Follow-up care is a key part of your treatment and safety. Be sure to make and go to all appointments, and call your doctor if you are having problems. It's also a good idea to know your test results and keep a list of the medicines you take. How can you care for yourself at home? · Ask your family, friends, and coworkers for support. You have a better chance of quitting if you have help and support. · Join a support group for people who are trying to quit using smokeless tobacco. 
· Set a quit date. Pick your date carefully so that it is not right in the middle of a big deadline or stressful time.  After you quit, do not use smokeless tobacco even once. Get rid of all spit cups, cans, and pouches after your last use. Clean your house and your clothes so that they do not smell of tobacco. 
· Learn how to be a non-user. Think about ways you can avoid those things that make you reach for tobacco. 
¨ Learn some ways to deal with cravings, like calling a friend or going for a walk. Cravings often pass. ¨ Avoid situations that put you at greatest risk for using smokeless tobacco. For some people, it is hard to spend time with friends without dipping or chewing. For others, they might skip a coffee break with coworkers who smoke or use smokeless tobacco. 
¨ Change your daily routine. Take a different route to work, or eat a meal in a different place. · Cut down on stress. Calm yourself or release tension by doing an activity you enjoy, such as reading a book, taking a hot bath, or gardening. · Talk to your doctor or pharmacist about nicotine replacement therapy. You still get nicotine, but you do not use tobacco. Nicotine replacement products help you slowly reduce the amount of nicotine you need. Many of these products are available over the counter. They include nicotine patches, gum, lozenges, and inhalers. · Ask your doctor about bupropion (Wellbutrin) or varenicline (Chantix), which are prescription medicines. They do not contain nicotine. They help you by reducing withdrawal symptoms, such as stress and anxiety. · Get regular exercise. Having healthy habits will help your body move past its craving for nicotine. · Be prepared to keep trying. Most people are not successful the first few times they try to quit. Do not get mad at yourself if you use tobacco again. Make a list of things you learned, and think about when you want to try again, such as next week, next month, or next year. Where can you learn more? Go to http://jemma-javi.info/.  
Enter J027 in the search box to learn more about \"Stopping Smokeless Tobacco Use: Care Instructions. \" Current as of: March 20, 2017 Content Version: 11.4 © 4639-3160 Knack.it. Care instructions adapted under license by eShares (which disclaims liability or warranty for this information). If you have questions about a medical condition or this instruction, always ask your healthcare professional. [address] any warranty or liability for your use of this information. Introducing \[facility]\"" & HEALTH SERVICES! Natalya Donnelly introduces [employer] patient portal. Now you can access parts of your medical record, email your doctor's office, and request medication refills online. 1. In your internet browser, go to [email or web address] 2. Click on the First Time User? Click Here link in the Sign In box. You will see the New Member Sign Up page. 3. Enter your [employer] Access Code exactly as it appears below. You will not need to use this code after youve completed the sign-up process. If you do not sign up before the expiration date, you must request a new code. · [employer] Access Code: [email or web address] Expires: 5/27/2018  9:28 AM 
 
4. Enter the last four digits of your Social Security Number (xxxx) and Date of Birth (mm/dd/yyyy) as indicated and click Submit. You will be taken to the next sign-up page. 5. Create a [employer] ID. This will be your [employer] login ID and cannot be changed, so think of one that is secure and easy to remember. 6. Create a [employer] password. You can change your password at any time. 7. Enter your Password Reset Question and Answer. This can be used at a later time if you forget your password. 8. Enter your e-mail address. You will receive e-mail notification when new information is available in [address] 9. Click Sign Up. You can now view and download portions of your medical record. 10. Click the Download Summary menu link to download a portable copy of your medical information. If you have questions, please visit the Frequently Asked Questions section of the [employer] website. Remember, [employer] is NOT to be used for urgent needs. For medical emergencies, dial 911. Now available from your iPhone and Android! Please provide this summary of care documentation to your next provider. Your primary care clinician is listed as Gigi Wade. If you have any questions after today's visit, please call [phone].

## 2018-02-26 NOTE — PROGRESS NOTES
Chief Complaint   Patient presents with    Other     Per Chiropractor evaluate abdominal aorta       This is a 57 y/o  patient who presents for the above reason  Chronic lower back pain for which he has been seeing a chiropractor for many years. Patient was told after series of xrays that he has spondylosis of L5 . He has been going to the chiropractor because he did not want surgery due to nature of work. He recently had XR with Dr Tracey Aviles who advised her to see PCP to check abdominal aorta. Saw chiropractor on 2/21/18 and 2/23/18 and today for back worsening back pain. Discussed with patient risk factor such as smoking, HTN, hyperlipidemia and age  for AAA with patient . Patient advised to quits smoking to reduced the above risk. Patient confirm he is cutting back and now smokes less than pack/day. ROS:  Pertinent positive as above in HPI. All others were negative         Past Medical History:   Diagnosis Date    Hypercholesterolemia     Hypertension          Social History     Social History    Marital status:      Spouse name: N/A    Number of children: N/A    Years of education: N/A     Occupational History    Not on file. Social History Main Topics    Smoking status: Current Every Day Smoker     Packs/day: 1.00     Types: Cigarettes    Smokeless tobacco: Never Used    Alcohol use Yes      Comment: OCCASSIONALLY    Drug use: No    Sexual activity: Yes     Partners: Female     Other Topics Concern    Not on file     Social History Narrative     Current Outpatient Prescriptions   Medication Sig Dispense Refill    fenofibrate nanocrystallized (TRICOR) 145 mg tablet take 1 tablet by mouth once daily 30 Tab 5    amLODIPine (NORVASC) 5 mg tablet take 1 tablet by mouth once daily 30 Tab 5    clopidogrel (PLAVIX) 75 mg tab Take 1 Tab by mouth daily. 30 Tab 5    valsartan (DIOVAN) 320 mg tablet Take 1 Tab by mouth daily.  320 Tab 5    nebivolol (BYSTOLIC) 5 mg tablet Take 1 Tab by mouth daily. 30 Tab 5    VITAMIN D2 50,000 unit capsule take 1 capsule by mouth every week 12 Cap 1    cetirizine (ZYRTEC) 10 mg tablet Take 1 Tab by mouth daily. 30 Tab 2    fluticasone (FLONASE) 50 mcg/actuation nasal spray 1 spray into each nostril daily 1 Bottle 6    Omega-3 Fatty Acids (FISH OIL) 500 mg cap Take  by mouth.  aspirin 81 mg chewable tablet Take 81 mg by mouth daily.  ascorbic acid, vitamin C, (VITAMIN C) 500 mg tablet Take  by mouth. Allergies   Allergen Reactions    Simvastatin Other (comments)         Physical Exam:    Vital Signs:   Visit Vitals    /70    Pulse 60    Temp 95.7 °F (35.4 °C) (Oral)    Resp 18    Ht 6' 2\" (1.88 m)    Wt 209 lb (94.8 kg)    BMI 26.83 kg/m2         General: a, a & o x 3, afebrile, interacting appropriately, in no acute distress  Respiratory: lung sounds clear bilaterally,  no wheezes or crackles  Cardiovascular: normal S1S2, regular rate and rhythm, no murmurs  Abdomen: distended, normal bowel sounds x 4 quadrants, soft, non-tender. No pulsatile sensation appreciated  Musculoskeletal: Normal ROM. No swelling or deformity. + fadumo lumber tnederness    Assessment/Plan:      ICD-10-CM ICD-9-CM    1. Essential hypertension ( controlled) I10 401.9 Continue current medication   2. Mixed hyperlipidemia E78.2 272.2 Fenofibrate 145 mg daily   3. Generalized abdominal pain R10.84 789.07 US EXAM SCREENING AAA   4. Chronic bilateral low back pain with bilateral sciatica M54.42 724.2 US EXAM SCREENING AAA    M54.41 724.3 REFERRAL TO SPINE SURGERY    G89.29 338.29    5. Heavy smoker F17.200 305.1   The patient was counseled on the dangers of tobacco use, and was advised to quit. Reviewed strategies to maximize success, including removing cigarettes and smoking materials from environment, stress management, substitution of other forms of reinforcement, support of family/friends and written materials.              Additional Notes: Discussed today's diagnosis, treatment plans. Discussed medication indications and side effects. After Visit Summary: Provided and discussed printed patient instructions. Answered questions in relation to today's diagnosis.   Follow-up Disposition:  2 weeks                 ANABELLE Eric  [address] [place] [address],[address]            Orders Placed This Encounter    AAA Screening US ([id number])   [address] [facility]

## 2018-02-26 NOTE — PROGRESS NOTES
1. Have you been to the ER, urgent care clinic since your last visit? Hospitalized since your last visit? No    2. Have you seen or consulted any other health care providers outside of the [address] [place] [address] since your last visit? Include any pap smears or colon screening.  No

## 2018-02-26 NOTE — PATIENT INSTRUCTIONS
Learning About Benefits From Quitting Smoking  How does quitting smoking make you healthier? If you're thinking about quitting smoking, you may have a few reasons to be smoke-free. Your health may be one of them. · When you quit smoking, you lower your risks for cancer, lung disease, heart attack, stroke, blood vessel disease, and blindness from macular degeneration. · When you're smoke-free, you get sick less often, and you heal faster. You are less likely to get colds, flu, bronchitis, and pneumonia. · As a nonsmoker, you may find that your mood is better and you are less stressed. When and how will you feel healthier? Quitting has real health benefits that start from day 1 of being smoke-free. And the longer you stay smoke-free, the healthier you get and the better you feel. The first hours  · After just 20 minutes, your blood pressure and heart rate go down. That means there's less stress on your heart and blood vessels. · Within 12 hours, the level of carbon monoxide in your blood drops back to normal. That makes room for more oxygen. With more oxygen in your body, you may notice that you have more energy than when you smoked. After 2 weeks  · Your lungs start to work better. · Your risk of heart attack starts to drop. After 1 month  · When your lungs are clear, you cough less and breathe deeper, so it's easier to be active. · Your sense of taste and smell return. That means you can enjoy food more than you have since you started smoking. Over the years  · After 1 year, your risk of heart disease is half what it would be if you kept smoking. · After 5 years, your risk of stroke starts to shrink. Within a few years after that, it's about the same as if you'd never smoked. · After 10 years, your risk of dying from lung cancer is cut by about half. And your risk for many other types of cancer is lower too. How would quitting help others in your life?   When you quit smoking, you improve the health of everyone who now breathes in your smoke. · Their heart, lung, and cancer risks drop, much like yours. · They are sick less. For babies and small children, living smoke-free means they're less likely to have ear infections, pneumonia, and bronchitis. · If you're a woman who is or will be pregnant someday, quitting smoking means a healthier . · Children who are close to you are less likely to become adult smokers. Where can you learn more? Go to http://jemma-javi.info/. Enter 052 [id number] 72 [id number] in the search box to learn more about \"Learning About Benefits From Quitting Smoking. \"  Current as of: 2017  Content Version: 11.4  © 5141-5777 [employer]. Care instructions adapted under license by [employer] (which disclaims liability or warranty for this information). If you have questions about a medical condition or this instruction, always ask your healthcare professional. [place] [address] any warranty or liability for your use of this information. Stopping Smokeless Tobacco Use: Care Instructions  Your Care Instructions    Smokeless tobacco comes in many forms, such as snuff and chewing tobacco:  · Snuff is finely ground tobacco sold in cans or pouches. Most of the time, snuff is used by putting a \"pinch\" or \"dip\" between the lower lip or cheek and the gum. · Chewing tobacco is sold as loose leaves, plugs, or twists. It is chewed or placed between the cheek and the gum or teeth. There are plenty of reasons to stop using smokeless tobacco. These products are harmful. They are not risk-free alternatives to smoking. Smokeless tobacco contains nicotine, which is addicting. Though using smokeless tobacco is less harmful than smoking cigarettes, it can cause serious health problems, such as:  · White patches or red sores in your mouth that can turn into mouth cancer involving the lip, tongue, or cheek.   · Tooth loss and other dental problems. · Gum disease. Your gums may pull away from your teeth and not grow back. People who use smokeless tobacco crave the nicotine in it. Giving up smokeless tobacco is much harder than simply changing a habit. Your body has to stop craving the nicotine. It is hard to quit, but you can do it. Many tools are available for people who want to quit using smokeless tobacco. You may find that combining tools works best for you. There are several steps to quitting. First you get ready to quit. Then you get support to help you. After that, you learn new skills and behaviors to quit. For many people, a necessary step is getting and using medicine. Your doctor will help you set up the plan that best meets your needs. You may want to attend a tobacco cessation program. When you choose a program, look for one that has proven success. Ask your doctor for ideas. You will greatly increase your chances of success if you take medicine as well as get counseling or join a cessation program.  Some of the changes you feel when you first quit smokeless tobacco are uncomfortable. Your body will miss the nicotine at first, and you may feel short-tempered and grumpy. You may have trouble sleeping or concentrating. Medicine can help you deal with these symptoms. You may struggle with changing your habits and rituals. The last step is the tricky one: Be prepared for the urge to use smokeless tobacco to continue for a time. This is a lot to deal with, but keep at it. You will feel better. Follow-up care is a key part of your treatment and safety. Be sure to make and go to all appointments, and call your doctor if you are having problems. It's also a good idea to know your test results and keep a list of the medicines you take. How can you care for yourself at home? · Ask your family, friends, and coworkers for support. You have a better chance of quitting if you have help and support.   · Join a support group for people who are trying to quit using smokeless tobacco.  · Set a quit date. Pick your date carefully so that it is not right in the middle of a big deadline or stressful time. After you quit, do not use smokeless tobacco even once. Get rid of all spit cups, cans, and pouches after your last use. Clean your house and your clothes so that they do not smell of tobacco.  · Learn how to be a non-user. Think about ways you can avoid those things that make you reach for tobacco.  ¨ Learn some ways to deal with cravings, like calling a friend or going for a walk. Cravings often pass. ¨ Avoid situations that put you at greatest risk for using smokeless tobacco. For some people, it is hard to spend time with friends without dipping or chewing. For others, they might skip a coffee break with coworkers who smoke or use smokeless tobacco.  ¨ Change your daily routine. Take a different route to work, or eat a meal in a different place. · Cut down on stress. Calm yourself or release tension by doing an activity you enjoy, such as reading a book, taking a hot bath, or gardening. · Talk to your doctor or pharmacist about nicotine replacement therapy. You still get nicotine, but you do not use tobacco. Nicotine replacement products help you slowly reduce the amount of nicotine you need. Many of these products are available over the counter. They include nicotine patches, gum, lozenges, and inhalers. · Ask your doctor about bupropion (Wellbutrin) or varenicline (Chantix), which are prescription medicines. They do not contain nicotine. They help you by reducing withdrawal symptoms, such as stress and anxiety. · Get regular exercise. Having healthy habits will help your body move past its craving for nicotine. · Be prepared to keep trying. Most people are not successful the first few times they try to quit. Do not get mad at yourself if you use tobacco again.  Make a list of things you learned, and think about when you want to try again, such as next week, next month, or next year. Where can you learn more? Go to http://jemma-javi.info/. Enter X115 in the search box to learn more about \"Stopping Smokeless Tobacco Use: Care Instructions. \"  Current as of: March 20, 2017  Content Version: 11.4  © 3786-4505 [email or web address]. Care instructions adapted under license by [employer] (which disclaims liability or warranty for this information). If you have questions about a medical condition or this instruction, always ask your healthcare professional. [address] any warranty or liability for your use of this information.

## 2018-03-01 ENCOUNTER — HOSPITAL ENCOUNTER (OUTPATIENT)
Dept: ULTRASOUND IMAGING | Age: 63
Discharge: HOME | End: 2018-03-01
Attending: NURSE PRACTITIONER
Payer: COMMERCIAL

## 2018-03-01 DIAGNOSIS — F17.200 HEAVY SMOKER: ICD-10-CM

## 2018-03-01 DIAGNOSIS — G89.29 CHRONIC BILATERAL LOW BACK PAIN WITH BILATERAL SCIATICA: ICD-10-CM

## 2018-03-01 DIAGNOSIS — R10.84 GENERALIZED ABDOMINAL PAIN: ICD-10-CM

## 2018-03-01 DIAGNOSIS — M54.41 CHRONIC BILATERAL LOW BACK PAIN WITH BILATERAL SCIATICA: ICD-10-CM

## 2018-03-01 DIAGNOSIS — M54.42 CHRONIC BILATERAL LOW BACK PAIN WITH BILATERAL SCIATICA: ICD-10-CM

## 2018-03-01 PROCEDURE — 76706 US ABDL AORTA SCREEN AAA: CPT

## 2018-04-19 ENCOUNTER — OFFICE VISIT (OUTPATIENT)
Dept: FAMILY MEDICINE CLINIC | Age: 63
End: 2018-04-19

## 2018-04-19 VITALS
TEMPERATURE: 96.9 F | SYSTOLIC BLOOD PRESSURE: 133 MMHG | HEART RATE: 70 BPM | HEIGHT: 74 IN | WEIGHT: 213.8 LBS | RESPIRATION RATE: 18 BRPM | DIASTOLIC BLOOD PRESSURE: 82 MMHG | BODY MASS INDEX: 27.44 KG/M2

## 2018-04-19 DIAGNOSIS — M54.42 CHRONIC BILATERAL LOW BACK PAIN WITH BILATERAL SCIATICA: Primary | ICD-10-CM

## 2018-04-19 DIAGNOSIS — M54.41 CHRONIC BILATERAL LOW BACK PAIN WITH BILATERAL SCIATICA: Primary | ICD-10-CM

## 2018-04-19 DIAGNOSIS — I10 ESSENTIAL HYPERTENSION: ICD-10-CM

## 2018-04-19 DIAGNOSIS — G89.29 CHRONIC BILATERAL LOW BACK PAIN WITH BILATERAL SCIATICA: Primary | ICD-10-CM

## 2018-04-19 DIAGNOSIS — Z12.11 SCREENING FOR COLON CANCER: ICD-10-CM

## 2018-04-19 NOTE — PROGRESS NOTES
Chief Complaint   Patient presents with    Medication Management     questions about Plavix and epidural he is scheduled to have       This is a 57 y/o  patient who presents for back pain. Pain have been getting worse and is currently being followed by spine specialist- Dr Gilbert Luna    Patient currently in pain management and  states he will be getting epidural injection for back pain and was advised  stop Aspirin and Plavix 7 days prior to procedure. HTN: BP good today  No headache or dizziness      He recently had XR with Dr Madelyn Robbins who advised her to see PCP to check abdominal aorta. Saw chiropractor on 2/21/18 and 2/23/18 and today for back worsening back pain. Discussed with patient risk factor such as smoking, HTN, hyperlipidemia and age  for AAA with patient . Patient advised to quits smoking to reduced the above risk. Patient confirm he is cutting back and now smokes less than pack/day. Due for colonoscopy      ROS:  Pertinent positive as above in HPI. All others were negative         Past Medical History:   Diagnosis Date    Hypercholesterolemia     Hypertension          Social History     Social History    Marital status:      Spouse name: N/A    Number of children: N/A    Years of education: N/A     Occupational History    Not on file. Social History Main Topics    Smoking status: Current Every Day Smoker     Packs/day: 1.00     Types: Cigarettes    Smokeless tobacco: Never Used    Alcohol use Yes      Comment: OCCASSIONALLY    Drug use: No    Sexual activity: Yes     Partners: Female     Other Topics Concern    Not on file     Social History Narrative     Current Outpatient Prescriptions   Medication Sig Dispense Refill    fenofibrate nanocrystallized (TRICOR) 145 mg tablet take 1 tablet by mouth once daily 30 Tab 5    amLODIPine (NORVASC) 5 mg tablet take 1 tablet by mouth once daily 30 Tab 5    clopidogrel (PLAVIX) 75 mg tab Take 1 Tab by mouth daily.  30 Tab 5    valsartan (DIOVAN) 320 mg tablet Take 1 Tab by mouth daily. 320 Tab 5    nebivolol (BYSTOLIC) 5 mg tablet Take 1 Tab by mouth daily. 30 Tab 5    VITAMIN D2 50,000 unit capsule take 1 capsule by mouth every week 12 Cap 1    cetirizine (ZYRTEC) 10 mg tablet Take 1 Tab by mouth daily. 30 Tab 2    fluticasone (FLONASE) 50 mcg/actuation nasal spray 1 spray into each nostril daily 1 Bottle 6    Omega-3 Fatty Acids (FISH OIL) 500 mg cap Take  by mouth.  aspirin 81 mg chewable tablet Take 81 mg by mouth daily.  ascorbic acid, vitamin C, (VITAMIN C) 500 mg tablet Take  by mouth. Allergies   Allergen Reactions    Simvastatin Other (comments)         Physical Exam:    Vital Signs:   Visit Vitals    /82    Pulse 70    Temp 96.9 °F (36.1 °C) (Oral)    Resp 18    Ht 6' 2\" (1.88 m)    Wt 209 lb (94.8 kg)    BMI 26.83 kg/m2         General: a, a & o x 3, afebrile, interacting appropriately, in no acute distress  Respiratory: lung sounds clear bilaterally,  no wheezes or crackles  Cardiovascular: normal S1S2, regular rate and rhythm, no murmurs  Musculoskeletal: Normal ROM. No swelling or deformity. + fadumo lumber tnederness        Assessment/Plan:      ICD-10-CM ICD-9-CM    1. Chronic bilateral low back pain with bilateral sciatica M54.42 724.2 Followed by ortho and pain management    M54.41 724.3     G89.29 338.29    2. Essential hypertension ( controlled) I10 401.9 Continue current medication   3. Screening for colon cancer Z12.11 V76.51 REFERRAL FOR COLONOSCOPY           Additional Notes: Discussed today's diagnosis, treatment plans. Discussed medication indications and side effects. After Visit Summary: Provided and discussed printed patient instructions. Answered questions in relation to today's diagnosis.   Follow-up Disposition:  As needed                 Nabeel Langston NP-BC  [address] [facility]          Orders Placed This Encounter    REFERRAL FOR COLONOSCOPY

## 2018-04-19 NOTE — MR AVS SNAPSHOT
Beverly Basurto [facility] [address] [facility] [place]. [address] [id number] 
[phone] Patient: Kaelyn Huerta MRN: [id number] Visit Information Date & Time Provider Department Dept. Phone Encounter #  
 4/19/2018  7:30 AM Du Harden, [address] [facility] [phone] [id number] Follow-up Instructions Return if symptoms worsen or fail to improve. Your Appointments 5/8/2018  8:00 AM  
Follow Up with LIZETT Rosales 23 ([facility]) Appt Note: 6 MONTH FU APPT [facility] [place] [address]  
  
   
 [address] [place] [address] [address] Upcoming Health Maintenance Date Due COLONOSCOPY 5/30/2018 DTaP/Tdap/Td series (2 - Td) 2/12/2023 Allergies as of 4/19/2018  Review Complete On: 4/19/2018 By: Becky Oliva LPN Severity Noted Reaction Type Reactions Simvastatin Medium 05/07/2015    Other (comments) Current Immunizations  Reviewed on 7/24/2017 Name Date Influenza Vaccine 10/24/2013 12:00 AM  
 Influenza Vaccine (Quad) PF 11/20/2017 Pneumococcal Polysaccharide (PPSV-23) 1/16/2014 12:00 AM  
 Zoster Vaccine, Live 2/16/2015 12:00 AM  
  
 Not reviewed this visit You Were Diagnosed With   
  
 Codes Comments Screening for colon cancer    -  Primary ICD-10-CM: Z12.11 ICD-9-CM: V76.51 Essential hypertension     ICD-10-CM: I10 
ICD-9-CM: 401.9 Mixed hyperlipidemia     ICD-10-CM: E78.2 ICD-9-CM: 272.2 Chronic bilateral low back pain with bilateral sciatica     ICD-10-CM: M54.42, M54.41, G89.29 ICD-9-CM: 724.2, 724.3, 338.29 Vitals BP Pulse Temp Resp Height(growth percentile) Weight(growth percentile) 133/82 70 96.9 °F (36.1 °C) (Oral) 18 6' 2\" (1.88 m) 209 lb (94.8 kg) BMI Smoking Status 26.83 kg/m2 Current Every Day Smoker Vitals History BMI and BSA Data Body Mass Index Body Surface Area  
 26.83 kg/m 2 2.22 m 2 Preferred Pharmacy Pharmacy Name Phone [id number], [address] [phone] Your Updated Medication List  
  
   
This list is accurate as of 4/19/18  8:10 AM.  Always use your most recent med list. amLODIPine 5 mg tablet Commonly known as:  NORVASC  
take 1 tablet by mouth once daily  
  
 aspirin 81 mg chewable tablet Take 81 mg by mouth daily. cetirizine 10 mg tablet Commonly known as:  ZYRTEC Take 1 Tab by mouth daily. clopidogrel 75 mg Tab Commonly known as:  PLAVIX Take 1 Tab by mouth daily. fenofibrate nanocrystallized 145 mg tablet Commonly known as:  TRICOR  
take 1 tablet by mouth once daily FISH  mg Cap Generic drug:  Omega-3 Fatty Acids Take  by mouth. fluticasone 50 mcg/actuation nasal spray Commonly known as:  FLONASE  
1 spray into each nostril daily  
  
 nebivolol 5 mg tablet Commonly known as:  BYSTOLIC Take 1 Tab by mouth daily. valsartan 320 mg tablet Commonly known as:  DIOVAN Take 1 Tab by mouth daily. VITAMIN C 500 mg tablet Generic drug:  ascorbic acid (vitamin C) Take  by mouth. VITAMIN D2 50,000 unit capsule Generic drug:  ergocalciferol  
take 1 capsule by mouth every week We Performed the Following REFERRAL FOR COLONOSCOPY [QMV127 Custom] Comments:  
 Please evaluate patient for screening for colon ca Follow-up Instructions Return if symptoms worsen or fail to improve. Referral Information Referral ID Referred By Referred To  
  
 [id number] Yahir Decker Not Available Visits Status Start Date End Date 1 New Request 4/19/18 4/19/19 If your referral has a status of pending review or denied, additional information will be sent to support the outcome of this decision. Patient Instructions Colon Cancer Screening: Care Instructions Your Care Instructions Colorectal cancer occurs in the colon or rectum. That's the lower part of your digestive system. It is the second-leading cause of cancer deaths in the [place]. It often starts with small growths called polyps in the colon or rectum. Polyps are usually found with screening tests. Depending on the type of test, any polyps found may be removed during the tests. Colorectal cancer usually does not cause symptoms at first. But regular tests can help find it early, before it spreads and becomes harder to treat. Experts advise routine tests for colon cancer for people starting at age 48. And they advise people with a higher risk of colon cancer to get tested sooner. Talk with your doctor about when you should start testing. Discuss which tests you need. Follow-up care is a key part of your treatment and safety. Be sure to make and go to all appointments, and call your doctor if you are having problems. It's also a good idea to know your test results and keep a list of the medicines you take. What are the main screening tests for colon cancer? · Stool tests. These include the fecal immunochemical test (FIT) and the fecal occult blood test (FOBT). These tests check stool samples for signs of cancer. If your test is positive, you will need to have a colonoscopy. · Sigmoidoscopy. This test lets your doctor look at the lining of your rectum and the lowest part of your colon. Your doctor uses a lighted tube called a sigmoidoscope. This test can't find cancers or polyps in the upper part of your colon. In some cases, polyps that are found can be removed. But if your doctor finds polyps, you will need to have a colonoscopy to check the upper part of your colon. · Colonoscopy. This test lets your doctor look at the lining of your rectum and your entire colon.  The doctor uses a thin, flexible tool called a colonoscope. It can also be used to remove polyps or get a tissue sample (biopsy). What tests do you need? The following guidelines are for people age 48 and over who are not at high risk for colorectal cancer. You may have at least one of these tests as directed by your doctor. · Fecal immunochemical test (FIT) or fecal occult blood test (FOBT) every year · Sigmoidoscopy every 5 years · Colonoscopy every 10 years If you are age 68 to 80, you can work with your doctor to decide if screening is a good option. If you are age 80 or older, your doctor will likely advise that screening is not helpful. Talk with your doctor about when you need to be tested. And discuss which tests are right for you. Your doctor may recommend earlier or more frequent testing if you: 
· Have had colorectal cancer before. · Have had colon polyps. · Have symptoms of colorectal cancer. These include blood in your stool and changes in your bowel habits. · Have a parent, brother or sister, or child with colon polyps or colorectal cancer. · Have a bowel disease. This includes ulcerative colitis and Crohn's disease. · Have a rare polyp syndrome that runs in families, such as familial adenomatous polyposis (FAP). · Have had radiation treatments to the belly or pelvis. When should you call for help? Watch closely for changes in your health, and be sure to contact your doctor if: 
? · You have any changes in your bowel habits. ? · You have any problems. Where can you learn more? Go to http://jemma-javi.info/. Enter M541 in the search box to learn more about \"Colon Cancer Screening: Care Instructions. \" Current as of: May 12, 2017 Content Version: 11.4 © 0461-6018 [employer]. Care instructions adapted under license by [employer] (which disclaims liability or warranty for this information).  If you have questions about a medical condition or this instruction, always ask your healthcare professional. [place] [address] any warranty or liability for your use of this information. Learning About Lumbar Epidural Steroid Injections What is a lumbar epidural steroid injection? A doctor may give you a lumbar epidural steroid injection to try to decrease pain, tingling, or numbness in your back, buttock, or leg. These might be the result of a back or disc problem. The injection goes directly into your epidural space. This is the area in your back around your spinal cord. This injection may have both a local anesthetic and a steroid medicine. Or it may only have a steroid. Local anesthetic medicines numb your nerves right away for a short time. Steroids reduce swelling and pain. But they take a few days to start working. Some people get a series of these injections over weeks or months. How is a lumbar epidural done? The doctor may use an imaging test before or during your injection. This can be an MRI, a CT scan, or an X-ray. These tests can show where your nerve problems are. After finding the right spot, the doctor may inject a numbing medicine into the skin where you will get the steroid injection. Then he or she puts the needle for the steroid into the numbed area. You may feel some pressure. You could feel some stinging or burning during the injection. It takes about 10 to 15 minutes to get this injection. You will probably go home about 20 to 30 minutes after you get it. You will need someone to drive you home. What can you expect after a lumbar epidural? 
If your injection had local anesthetic and a steroid, your legs may feel heavy or numb right after. You will probably be able to walk. But you may need to be extra careful. Take care not to lose your balance and be sure to follow your doctor's instructions.  
If your injection contained local anesthetic, you may feel better right away. But this pain relief will last only a few hours. Your pain will probably return. This is because the steroids have not started working yet. Before the steroids start to work, your back may be sore for a few days. These injections don't always work. When they do, it takes 1 to 5 days. This pain relief can last for several days to a few months or longer. You may want to do less than normal for a few days. But you may also be able to return to your daily routine. Some people are dizzy or feel sick to their stomach after getting this injection. These symptoms usually do not last very long. If your pain is better, you may be able to keep doing your normal activities or physical therapy. But try not to overdo it, even if your back pain has improved a lot. If your pain is only a little better or if it comes back, your doctor may recommend another injection in a few weeks. If your pain has not changed, talk to your doctor about other treatment choices. Side effects from an epidural steroid injection include headache, fever, or infection. Serious side effects are rare. But they can include stroke, paralysis, or loss of vision. Follow-up care is a key part of your treatment and safety. Be sure to make and go to all appointments, and call your doctor if you are having problems. It's also a good idea to know your test results and keep a list of the medicines you take. Where can you learn more? Go to http://jemma-javi.info/. Enter Angelina Rolling in the search box to learn more about \"Learning About Lumbar Epidural Steroid Injections. \" Current as of: March 21, 2017 Content Version: 11.4 © 6709-5453 [employer]. Care instructions adapted under license by [employer] (which disclaims liability or warranty for this information).  If you have questions about a medical condition or this instruction, always ask your healthcare professional. Rik Smith, [facility] disclaims any warranty or liability for your use of this information. Learning About Lumbar Epidural Steroid Injections What is a lumbar epidural steroid injection? A doctor may give you a lumbar epidural steroid injection to try to decrease pain, tingling, or numbness in your back, buttock, or leg. These might be the result of a back or disc problem. The injection goes directly into your epidural space. This is the area in your back around your spinal cord. This injection may have both a local anesthetic and a steroid medicine. Or it may only have a steroid. Local anesthetic medicines numb your nerves right away for a short time. Steroids reduce swelling and pain. But they take a few days to start working. Some people get a series of these injections over weeks or months. How is a lumbar epidural done? The doctor may use an imaging test before or during your injection. This can be an MRI, a CT scan, or an X-ray. These tests can show where your nerve problems are. After finding the right spot, the doctor may inject a numbing medicine into the skin where you will get the steroid injection. Then he or she puts the needle for the steroid into the numbed area. You may feel some pressure. You could feel some stinging or burning during the injection. It takes about 10 to 15 minutes to get this injection. You will probably go home about 20 to 30 minutes after you get it. You will need someone to drive you home. What can you expect after a lumbar epidural? 
If your injection had local anesthetic and a steroid, your legs may feel heavy or numb right after. You will probably be able to walk. But you may need to be extra careful. Take care not to lose your balance and be sure to follow your doctor's instructions. If your injection contained local anesthetic, you may feel better right away. But this pain relief will last only a few hours.  Your pain will probably return. This is because the steroids have not started working yet. Before the steroids start to work, your back may be sore for a few days. These injections don't always work. When they do, it takes 1 to 5 days. This pain relief can last for several days to a few months or longer. You may want to do less than normal for a few days. But you may also be able to return to your daily routine. Some people are dizzy or feel sick to their stomach after getting this injection. These symptoms usually do not last very long. If your pain is better, you may be able to keep doing your normal activities or physical therapy. But try not to overdo it, even if your back pain has improved a lot. If your pain is only a little better or if it comes back, your doctor may recommend another injection in a few weeks. If your pain has not changed, talk to your doctor about other treatment choices. Side effects from an epidural steroid injection include headache, fever, or infection. Serious side effects are rare. But they can include stroke, paralysis, or loss of vision. Follow-up care is a key part of your treatment and safety. Be sure to make and go to all appointments, and call your doctor if you are having problems. It's also a good idea to know your test results and keep a list of the medicines you take. Where can you learn more? Go to http://jemma-javi.info/. Enter Arielle Jerod in the search box to learn more about \"Learning About Lumbar Epidural Steroid Injections. \" Current as of: March 21, 2017 Content Version: 11.4 © 6961-6655 [employer]. Care instructions adapted under license by [employer] (which disclaims liability or warranty for this information). If you have questions about a medical condition or this instruction, always ask your healthcare professional. [address] any warranty or liability for your use of this information. Introducing [facility]! Dottie Isabel introduces [employer] patient portal. Now you can access parts of your medical record, email your doctor's office, and request medication refills online. 1. In your internet browser, go to [email or web address] The [email or web address] 2. Click on the First Time User? Click Here link in the Sign In box. You will see the New Member Sign Up page. 3. Enter your [employer] Access Code exactly as it appears below. You will not need to use this code after youve completed the sign-up process. If you do not sign up before the expiration date, you must request a new code. · [employer] Access Code: [email or web address] Expires: 5/27/2018 10:28 AM 
 
4. Enter the last four digits of your Social Security Number (xxxx) and Date of Birth (mm/dd/yyyy) as indicated and click Submit. You will be taken to the next sign-up page. 5. Create a [employer] ID. This will be your [employer] login ID and cannot be changed, so think of one that is secure and easy to remember. 6. Create a [employer] password. You can change your password at any time. 7. Enter your Password Reset Question and Answer. This can be used at a later time if you forget your password. 8. Enter your e-mail address. You will receive e-mail notification when new information is available in [address] 9. Click Sign Up. You can now view and download portions of your medical record. 10. Click the Download Summary menu link to download a portable copy of your medical information. If you have questions, please visit the Frequently Asked Questions section of the [employer] website. Remember, [employer] is NOT to be used for urgent needs. For medical emergencies, dial 911. Now available from your iPhone and Android! Please provide this summary of care documentation to your next provider. Your primary care clinician is listed as Enrique Boland. If you have any questions after today's visit, please call [phone].

## 2018-04-19 NOTE — PATIENT INSTRUCTIONS
Colon Cancer Screening: Care Instructions  Your Care Instructions    Colorectal cancer occurs in the colon or rectum. That's the lower part of your digestive system. It is the second-leading cause of cancer deaths in the [place]. It often starts with small growths called polyps in the colon or rectum. Polyps are usually found with screening tests. Depending on the type of test, any polyps found may be removed during the tests. Colorectal cancer usually does not cause symptoms at first. But regular tests can help find it early, before it spreads and becomes harder to treat. Experts advise routine tests for colon cancer for people starting at age 48. And they advise people with a higher risk of colon cancer to get tested sooner. Talk with your doctor about when you should start testing. Discuss which tests you need. Follow-up care is a key part of your treatment and safety. Be sure to make and go to all appointments, and call your doctor if you are having problems. It's also a good idea to know your test results and keep a list of the medicines you take. What are the main screening tests for colon cancer? · Stool tests. These include the fecal immunochemical test (FIT) and the fecal occult blood test (FOBT). These tests check stool samples for signs of cancer. If your test is positive, you will need to have a colonoscopy. · Sigmoidoscopy. This test lets your doctor look at the lining of your rectum and the lowest part of your colon. Your doctor uses a lighted tube called a sigmoidoscope. This test can't find cancers or polyps in the upper part of your colon. In some cases, polyps that are found can be removed. But if your doctor finds polyps, you will need to have a colonoscopy to check the upper part of your colon. · Colonoscopy. This test lets your doctor look at the lining of your rectum and your entire colon. The doctor uses a thin, flexible tool called a colonoscope.  It can also be used to remove polyps or get a tissue sample (biopsy). What tests do you need? The following guidelines are for people age 48 and over who are not at high risk for colorectal cancer. You may have at least one of these tests as directed by your doctor. · Fecal immunochemical test (FIT) or fecal occult blood test (FOBT) every year  · Sigmoidoscopy every 5 years  · Colonoscopy every 10 years  If you are age 68 to 80, you can work with your doctor to decide if screening is a good option. If you are age 80 or older, your doctor will likely advise that screening is not helpful. Talk with your doctor about when you need to be tested. And discuss which tests are right for you. Your doctor may recommend earlier or more frequent testing if you:  · Have had colorectal cancer before. · Have had colon polyps. · Have symptoms of colorectal cancer. These include blood in your stool and changes in your bowel habits. · Have a parent, brother or sister, or child with colon polyps or colorectal cancer. · Have a bowel disease. This includes ulcerative colitis and Crohn's disease. · Have a rare polyp syndrome that runs in families, such as familial adenomatous polyposis (FAP). · Have had radiation treatments to the belly or pelvis. When should you call for help? Watch closely for changes in your health, and be sure to contact your doctor if:  ? · You have any changes in your bowel habits. ? · You have any problems. Where can you learn more? Go to http://jemma-javi.info/. Enter M541 in the search box to learn more about \"Colon Cancer Screening: Care Instructions. \"  Current as of: May 12, 2017  Content Version: 11.4  © 8692-6233 [employer]. Care instructions adapted under license by [employer] (which disclaims liability or warranty for this information).  If you have questions about a medical condition or this instruction, always ask your healthcare professional. Penelope Espitia disclaims any warranty or liability for your use of this information. Learning About Lumbar Epidural Steroid Injections  What is a lumbar epidural steroid injection? A doctor may give you a lumbar epidural steroid injection to try to decrease pain, tingling, or numbness in your back, buttock, or leg. These might be the result of a back or disc problem. The injection goes directly into your epidural space. This is the area in your back around your spinal cord. This injection may have both a local anesthetic and a steroid medicine. Or it may only have a steroid. Local anesthetic medicines numb your nerves right away for a short time. Steroids reduce swelling and pain. But they take a few days to start working. Some people get a series of these injections over weeks or months. How is a lumbar epidural done? The doctor may use an imaging test before or during your injection. This can be an MRI, a CT scan, or an X-ray. These tests can show where your nerve problems are. After finding the right spot, the doctor may inject a numbing medicine into the skin where you will get the steroid injection. Then he or she puts the needle for the steroid into the numbed area. You may feel some pressure. You could feel some stinging or burning during the injection. It takes about 10 to 15 minutes to get this injection. You will probably go home about 20 to 30 minutes after you get it. You will need someone to drive you home. What can you expect after a lumbar epidural?  If your injection had local anesthetic and a steroid, your legs may feel heavy or numb right after. You will probably be able to walk. But you may need to be extra careful. Take care not to lose your balance and be sure to follow your doctor's instructions. If your injection contained local anesthetic, you may feel better right away. But this pain relief will last only a few hours. Your pain will probably return.  This is because the steroids have not started working yet. Before the steroids start to work, your back may be sore for a few days. These injections don't always work. When they do, it takes 1 to 5 days. This pain relief can last for several days to a few months or longer. You may want to do less than normal for a few days. But you may also be able to return to your daily routine. Some people are dizzy or feel sick to their stomach after getting this injection. These symptoms usually do not last very long. If your pain is better, you may be able to keep doing your normal activities or physical therapy. But try not to overdo it, even if your back pain has improved a lot. If your pain is only a little better or if it comes back, your doctor may recommend another injection in a few weeks. If your pain has not changed, talk to your doctor about other treatment choices. Side effects from an epidural steroid injection include headache, fever, or infection. Serious side effects are rare. But they can include stroke, paralysis, or loss of vision. Follow-up care is a key part of your treatment and safety. Be sure to make and go to all appointments, and call your doctor if you are having problems. It's also a good idea to know your test results and keep a list of the medicines you take. Where can you learn more? Go to http://jemma-javi.info/. Enter Caridad Coffman in the search box to learn more about \"Learning About Lumbar Epidural Steroid Injections. \"  Current as of: March 21, 2017  Content Version: 11.4  © 7561-3022 [employer]. Care instructions adapted under license by [employer] (which disclaims liability or warranty for this information). If you have questions about a medical condition or this instruction, always ask your healthcare professional. [address] any warranty or liability for your use of this information.        Learning About Lumbar Epidural Steroid Injections  What is a lumbar epidural steroid injection? A doctor may give you a lumbar epidural steroid injection to try to decrease pain, tingling, or numbness in your back, buttock, or leg. These might be the result of a back or disc problem. The injection goes directly into your epidural space. This is the area in your back around your spinal cord. This injection may have both a local anesthetic and a steroid medicine. Or it may only have a steroid. Local anesthetic medicines numb your nerves right away for a short time. Steroids reduce swelling and pain. But they take a few days to start working. Some people get a series of these injections over weeks or months. How is a lumbar epidural done? The doctor may use an imaging test before or during your injection. This can be an MRI, a CT scan, or an X-ray. These tests can show where your nerve problems are. After finding the right spot, the doctor may inject a numbing medicine into the skin where you will get the steroid injection. Then he or she puts the needle for the steroid into the numbed area. You may feel some pressure. You could feel some stinging or burning during the injection. It takes about 10 to 15 minutes to get this injection. You will probably go home about 20 to 30 minutes after you get it. You will need someone to drive you home. What can you expect after a lumbar epidural?  If your injection had local anesthetic and a steroid, your legs may feel heavy or numb right after. You will probably be able to walk. But you may need to be extra careful. Take care not to lose your balance and be sure to follow your doctor's instructions. If your injection contained local anesthetic, you may feel better right away. But this pain relief will last only a few hours. Your pain will probably return. This is because the steroids have not started working yet. Before the steroids start to work, your back may be sore for a few days. These injections don't always work.  When they do, it takes 1 to 5 days. This pain relief can last for several days to a few months or longer. You may want to do less than normal for a few days. But you may also be able to return to your daily routine. Some people are dizzy or feel sick to their stomach after getting this injection. These symptoms usually do not last very long. If your pain is better, you may be able to keep doing your normal activities or physical therapy. But try not to overdo it, even if your back pain has improved a lot. If your pain is only a little better or if it comes back, your doctor may recommend another injection in a few weeks. If your pain has not changed, talk to your doctor about other treatment choices. Side effects from an epidural steroid injection include headache, fever, or infection. Serious side effects are rare. But they can include stroke, paralysis, or loss of vision. Follow-up care is a key part of your treatment and safety. Be sure to make and go to all appointments, and call your doctor if you are having problems. It's also a good idea to know your test results and keep a list of the medicines you take. Where can you learn more? Go to http://jemma-javi.info/. Enter Elridge Elisa in the search box to learn more about \"Learning About Lumbar Epidural Steroid Injections. \"  Current as of: March 21, 2017  Content Version: 11.4  © 7345-6830 [employer]. Care instructions adapted under license by [employer] (which disclaims liability or warranty for this information). If you have questions about a medical condition or this instruction, always ask your healthcare professional. [place] [address] any warranty or liability for your use of this information.

## 2018-04-23 DIAGNOSIS — I10 ESSENTIAL HYPERTENSION: ICD-10-CM

## 2018-04-25 RX ORDER — CLOPIDOGREL BISULFATE 75 MG/1
TABLET ORAL
Qty: 30 TAB | Refills: 5 | Status: SHIPPED | OUTPATIENT
Start: 2018-04-25 | End: 2019-01-16 | Stop reason: SDUPTHER

## 2018-05-08 ENCOUNTER — OFFICE VISIT (OUTPATIENT)
Dept: FAMILY MEDICINE CLINIC | Age: 63
End: 2018-05-08

## 2018-05-08 VITALS
DIASTOLIC BLOOD PRESSURE: 74 MMHG | RESPIRATION RATE: 16 BRPM | SYSTOLIC BLOOD PRESSURE: 115 MMHG | WEIGHT: 216 LBS | HEART RATE: 62 BPM | HEIGHT: 74 IN | BODY MASS INDEX: 27.72 KG/M2 | OXYGEN SATURATION: 100 % | TEMPERATURE: 96.7 F

## 2018-05-08 DIAGNOSIS — E55.9 VITAMIN D DEFICIENCY: ICD-10-CM

## 2018-05-08 DIAGNOSIS — I10 ESSENTIAL HYPERTENSION: Primary | ICD-10-CM

## 2018-05-08 DIAGNOSIS — E66.3 OVERWEIGHT (BMI 25.0-29.9): ICD-10-CM

## 2018-05-08 DIAGNOSIS — E78.2 MIXED HYPERLIPIDEMIA: ICD-10-CM

## 2018-05-08 DIAGNOSIS — F17.200 HEAVY SMOKER: ICD-10-CM

## 2018-05-08 NOTE — PROGRESS NOTES
Chief Complaint   Patient presents with    Follow Up Chronic Condition       This is a 57 y/o  patient who presents for follow up chronic conditions. HTN: BP good today- 115/74  No headache or dizziness    Hyperlipidemia:LDL and Total cholesterol elevated    Component Results   Component Value Flag Ref Range Units Status   Cholesterol, total 242 (H) 100 - 199 mg/dL Final   Triglyceride 140  0 - 149 mg/dL Final   HDL Cholesterol 45  >39 mg/dL Final   VLDL, calculated 28  5 - 40 mg/dL Final   LDL, calculated 169 (H) 0 - 99 mg/dL Final         Patient confirm he has appointment with Nephrology today for elevated kidney function. Patient smokes about a pack of cigarette/per day  He was advised to quit. Due for colonoscopy- he confirm he have one scheduled for august      ROS:  Pertinent positive as above in HPI. All others were negative         Past Medical History:   Diagnosis Date    Hypercholesterolemia     Hypertension      History reviewed. No pertinent surgical history. Family History   Problem Relation Age of Onset    Diabetes Mother     Heart Disease Mother     Stroke Father     Diabetes Maternal Grandmother          Social History     Social History    Marital status:      Spouse name: N/A    Number of children: N/A    Years of education: N/A     Occupational History    Not on file.      Social History Main Topics    Smoking status: Current Every Day Smoker     Packs/day: 1.00     Types: Cigarettes    Smokeless tobacco: Never Used    Alcohol use No    Drug use: No    Sexual activity: Yes     Partners: Female     Other Topics Concern    Not on file     Social History Narrative     Current Outpatient Prescriptions   Medication Sig Dispense Refill    clopidogrel (PLAVIX) 75 mg tab take 1 tablet by mouth once daily 30 Tab 5    fenofibrate nanocrystallized (TRICOR) 145 mg tablet take 1 tablet by mouth once daily 30 Tab 5    amLODIPine (NORVASC) 5 mg tablet take 1 tablet by mouth once daily 30 Tab 5    valsartan (DIOVAN) 320 mg tablet Take 1 Tab by mouth daily. 320 Tab 5    nebivolol (BYSTOLIC) 5 mg tablet Take 1 Tab by mouth daily. 30 Tab 5    cetirizine (ZYRTEC) 10 mg tablet Take 1 Tab by mouth daily. 30 Tab 2    Omega-3 Fatty Acids (FISH OIL) 500 mg cap Take  by mouth.  aspirin 81 mg chewable tablet Take 81 mg by mouth daily.  VITAMIN D2 50,000 unit capsule take 1 capsule by mouth every week 12 Cap 1    fluticasone (FLONASE) 50 mcg/actuation nasal spray 1 spray into each nostril daily 1 Bottle 6    ascorbic acid, vitamin C, (VITAMIN C) 500 mg tablet Take  by mouth. Allergies   Allergen Reactions    Simvastatin Other (comments)         Physical Exam:    Vital Signs:   Visit Vitals    /74 (BP 1 Location: Left arm, BP Patient Position: Sitting)    Pulse 62    Temp 96.7 °F (35.9 °C) (Oral)    Resp 16    Ht 6' 2\" (1.88 m)    Wt 216 lb (98 kg)    SpO2 100%    BMI 27.73 kg/m2           General: a, a & o x 3, afebrile, interacting appropriately, in no acute distress  HEENT: head normocephalic and atraumatic, conjuctiva clear  Skin: color race-appropriate, warm and dry, no rashes , no bruises  Neck: supple, symmetrical, no palpable mass, no thyromegaly  Respiratory: symmetrical chest expansion, lung sounds clear,  no wheezes or crackles  Cardiovascular: normal S1S2, regular rate and rhythm, no murmurs  Musculoskeletal: normal ROM on all joints, no swelling or deformity  Lymphatic: no cervical lymphadenopathy   Neurological: sensation and motor function intact  Psychological: a, a & o x 3, appropriate mood and affect, no thought disorder      Assessment/Plan:        ICD-10-CM ICD-9-CM    1. Essential hypertension I10 401.9 CBC WITH AUTOMATED DIFF   2. Mixed hyperlipidemia E78.2 272.2 TSH 3RD GENERATION      LIPID PANEL      T4, FREE   3. Heavy smoker F17.200 305.1 The patient was counseled on the dangers of tobacco use, and was advised to quit.   Reviewed strategies to maximize success, including removing cigarettes and smoking materials from environment, stress management and substitution of other forms of reinforcement. 4. Vitamin D deficiency E55.9 268.9 VITAMIN D, 25 HYDROXY   5. Overweight (BMI 25.0-29. 9) C63.7 143.87 METABOLIC PANEL, COMPREHENSIVE      HEMOGLOBIN A1C WITH EAG             Additional Notes: Discussed today's diagnosis, treatment plans. Discussed medication indications and side effects. After Visit Summary: Provided and discussed printed patient instructions. Answered questions in relation to today's diagnosis.   Follow-up Disposition:  As needed                 Anabel Durán NP-BC  Family Medicine  [facility]        Orders Placed This Encounter    CBC WITH AUTOMATED DIFF    TSH 3RD GENERATION    LIPID PANEL    VITAMIN D, 25 HYDROXY    METABOLIC PANEL, COMPREHENSIVE    T4, FREE    HEMOGLOBIN A1C WITH EAG

## 2018-05-08 NOTE — PATIENT INSTRUCTIONS
DASH Diet: Care Instructions  Your Care Instructions    The DASH diet is an eating plan that can help lower your blood pressure. DASH stands for Dietary Approaches to Stop Hypertension. Hypertension is high blood pressure. The DASH diet focuses on eating foods that are high in calcium, potassium, and magnesium. These nutrients can lower blood pressure. The foods that are highest in these nutrients are fruits, vegetables, low-fat dairy products, nuts, seeds, and legumes. But taking calcium, potassium, and magnesium supplements instead of eating foods that are high in those nutrients does not have the same effect. The DASH diet also includes whole grains, fish, and poultry. The DASH diet is one of several lifestyle changes your doctor may recommend to lower your high blood pressure. Your doctor may also want you to decrease the amount of sodium in your diet. Lowering sodium while following the DASH diet can lower blood pressure even further than just the DASH diet alone. Follow-up care is a key part of your treatment and safety. Be sure to make and go to all appointments, and call your doctor if you are having problems. It's also a good idea to know your test results and keep a list of the medicines you take. How can you care for yourself at home? Following the DASH diet  · Eat 4 to 5 servings of fruit each day. A serving is 1 medium-sized piece of fruit, ½ cup chopped or canned fruit, 1/4 cup dried fruit, or 4 ounces (½ cup) of fruit juice. Choose fruit more often than fruit juice. · Eat 4 to 5 servings of vegetables each day. A serving is 1 cup of lettuce or raw leafy vegetables, ½ cup of chopped or cooked vegetables, or 4 ounces (½ cup) of vegetable juice. Choose vegetables more often than vegetable juice. · Get 2 to 3 servings of low-fat and fat-free dairy each day. A serving is 8 ounces of milk, 1 cup of yogurt, or 1 ½ ounces of cheese. · Eat 6 to 8 servings of grains each day.  A serving is 1 slice of bread, 1 ounce of dry cereal, or ½ cup of cooked rice, pasta, or cooked cereal. Try to choose whole-grain products as much as possible. · Limit lean meat, poultry, and fish to 2 servings each day. A serving is 3 ounces, about the size of a deck of cards. · Eat 4 to 5 servings of nuts, seeds, and legumes (cooked dried beans, lentils, and split peas) each week. A serving is 1/3 cup of nuts, 2 tablespoons of seeds, or ½ cup of cooked beans or peas. · Limit fats and oils to 2 to 3 servings each day. A serving is 1 teaspoon of vegetable oil or 2 tablespoons of salad dressing. · Limit sweets and added sugars to 5 servings or less a week. A serving is 1 tablespoon jelly or jam, ½ cup sorbet, or 1 cup of lemonade. · Eat less than 2,300 milligrams (mg) of sodium a day. If you limit your sodium to 1,500 mg a day, you can lower your blood pressure even more. Tips for success  · Start small. Do not try to make dramatic changes to your diet all at once. You might feel that you are missing out on your favorite foods and then be more likely to not follow the plan. Make small changes, and stick with them. Once those changes become habit, add a few more changes. · Try some of the following:  ¨ Make it a goal to eat a fruit or vegetable at every meal and at snacks. This will make it easy to get the recommended amount of fruits and vegetables each day. ¨ Try yogurt topped with fruit and nuts for a snack or healthy dessert. ¨ Add lettuce, tomato, cucumber, and onion to sandwiches. ¨ Combine a ready-made pizza crust with low-fat mozzarella cheese and lots of vegetable toppings. Try using tomatoes, squash, spinach, broccoli, carrots, cauliflower, and onions. ¨ Have a variety of cut-up vegetables with a low-fat dip as an appetizer instead of chips and dip. ¨ Sprinkle sunflower seeds or chopped almonds over salads. Or try adding chopped walnuts or almonds to cooked vegetables.   ¨ Try some vegetarian meals using beans and peas. Add garbanzo or kidney beans to salads. Make burritos and tacos with mashed higgins beans or black beans. Where can you learn more? Go to http://jemma-javi.info/. Enter E623 in the search box to learn more about \"DASH Diet: Care Instructions. \"  Current as of: September 21, 2016  Content Version: 11.4  © 5228-0987 [employer]. Care instructions adapted under license by [employer] (which disclaims liability or warranty for this information). If you have questions about a medical condition or this instruction, always ask your healthcare professional. [address] any warranty or liability for your use of this information. High Cholesterol: Care Instructions  Your Care Instructions    Cholesterol is a type of fat in your blood. It is needed for many body functions, such as making new cells. Cholesterol is made by your body. It also comes from food you eat. High cholesterol means that you have too much of the fat in your blood. This raises your risk of a heart attack and stroke. LDL and HDL are part of your total cholesterol. LDL is the \"bad\" cholesterol. High LDL can raise your risk for heart disease, heart attack, and stroke. HDL is the \"good\" cholesterol. It helps clear bad cholesterol from the body. High HDL is linked with a lower risk of heart disease, heart attack, and stroke. Your cholesterol levels help your doctor find out your risk for having a heart attack or stroke. You and your doctor can talk about whether you need to lower your risk and what treatment is best for you. A heart-healthy lifestyle along with medicines can help lower your cholesterol and your risk. The way you choose to lower your risk will depend on how high your risk is for heart attack and stroke. It will also depend on how you feel about taking medicines. Follow-up care is a key part of your treatment and safety.  Be sure to make and go to all appointments, and call your doctor if you are having problems. It's also a good idea to know your test results and keep a list of the medicines you take. How can you care for yourself at home? · Eat a variety of foods every day. Good choices include fruits, vegetables, whole grains (like oatmeal), dried beans and peas, nuts and seeds, soy products (like tofu), and fat-free or low-fat dairy products. · Replace butter, margarine, and hydrogenated or partially hydrogenated oils with olive and canola oils. (Canola oil margarine without trans fat is fine.)  · Replace red meat with fish, poultry, and soy protein (like tofu). · Limit processed and packaged foods like chips, crackers, and cookies. · Bake, broil, or steam foods. Don't sarkar them. · Be physically active. Get at least 30 minutes of exercise on most days of the week. Walking is a good choice. You also may want to do other activities, such as running, swimming, cycling, or playing tennis or team sports. · Stay at a healthy weight or lose weight by making the changes in eating and physical activity listed above. Losing just a small amount of weight, even 5 to 10 pounds, can reduce your risk for having a heart attack or stroke. · Do not smoke. When should you call for help? Watch closely for changes in your health, and be sure to contact your doctor if:  ? · You need help making lifestyle changes. ? · You have questions about your medicine. Where can you learn more? Go to http://jemma-javi.info/. Enter V415 in the search box to learn more about \"High Cholesterol: Care Instructions. \"  Current as of: September 21, 2016  Content Version: 11.4  © 0902-4867 [employer]. Care instructions adapted under license by [employer] (which disclaims liability or warranty for this information).  If you have questions about a medical condition or this instruction, always ask your healthcare professional. Fernando Rollins disclaims any warranty or liability for your use of this information.

## 2018-05-08 NOTE — MR AVS SNAPSHOT
Beverly Herrera [place] [address] [facility] [place] [facility] 83 [address] 
[phone] Patient: Althea Fall MRN: [id number] Visit Information Date & Time Provider Department Dept. Phone Encounter #  
 5/8/2018  8:00 AM Umberto Miller NP [address] [facility] [phone] [id number] Follow-up Instructions Return in about 6 months (around 11/8/2018). Upcoming Health Maintenance Date Due COLONOSCOPY 5/30/2018 Influenza Age 5 to Adult 8/1/2018 DTaP/Tdap/Td series (2 - Td) 2/12/2023 Allergies as of 5/8/2018  Review Complete On: 5/8/2018 By: Nate Marroquin LPN Severity Noted Reaction Type Reactions Simvastatin Medium 05/07/2015    Other (comments) Current Immunizations  Reviewed on 7/24/2017 Name Date Influenza Vaccine 10/24/2013 12:00 AM  
 Influenza Vaccine (Quad) PF 11/20/2017 Pneumococcal Polysaccharide (PPSV-23) 1/16/2014 12:00 AM  
 Zoster Vaccine, Live 2/16/2015 12:00 AM  
  
 Not reviewed this visit You Were Diagnosed With   
  
 Codes Comments Essential hypertension    -  Primary ICD-10-CM: I10 
ICD-9-CM: 401.9 Mixed hyperlipidemia     ICD-10-CM: E78.2 ICD-9-CM: 272.2 Heavy smoker     ICD-10-CM: F17.200 ICD-9-CM: 305.1 Vitamin D deficiency     ICD-10-CM: E55.9 ICD-9-CM: 268.9 Overweight (BMI 25.0-29. 9)     ICD-10-CM: Y66.6 ICD-9-CM: 278.02 Vitals BP Pulse Temp Resp Height(growth percentile) Weight(growth percentile) 115/74 (BP 1 Location: Left arm, BP Patient Position: Sitting) 62 96.7 °F (35.9 °C) (Oral) 16 6' 2\" (1.88 m) 216 lb (98 kg) SpO2 BMI Smoking Status 100% 27.73 kg/m2 Current Every Day Smoker Vitals History BMI and BSA Data Body Mass Index Body Surface Area  
 27.73 kg/m 2 2.26 m 2 Preferred Pharmacy Pharmacy Name Phone [facility] [address], [place] [address] [facility] [phone] Your Updated Medication List  
  
   
This list is accurate as of 5/8/18  8:39 AM.  Always use your most recent med list. amLODIPine 5 mg tablet Commonly known as:  NORVASC  
take 1 tablet by mouth once daily  
  
 aspirin 81 mg chewable tablet Take 81 mg by mouth daily. cetirizine 10 mg tablet Commonly known as:  ZYRTEC Take 1 Tab by mouth daily. clopidogrel 75 mg Tab Commonly known as:  PLAVIX  
take 1 tablet by mouth once daily  
  
 fenofibrate nanocrystallized 145 mg tablet Commonly known as:  TRICOR  
take 1 tablet by mouth once daily FISH  mg Cap Generic drug:  Omega-3 Fatty Acids Take  by mouth. fluticasone 50 mcg/actuation nasal spray Commonly known as:  FLONASE  
1 spray into each nostril daily  
  
 nebivolol 5 mg tablet Commonly known as:  BYSTOLIC Take 1 Tab by mouth daily. valsartan 320 mg tablet Commonly known as:  DIOVAN Take 1 Tab by mouth daily. VITAMIN C 500 mg tablet Generic drug:  ascorbic acid (vitamin C) Take  by mouth. VITAMIN D2 50,000 unit capsule Generic drug:  ergocalciferol  
take 1 capsule by mouth every week Follow-up Instructions Return in about 6 months (around 11/8/2018). To-Do List   
 05/08/2018 Lab:  CBC WITH AUTOMATED DIFF   
  
 05/08/2018 Lab:  HEMOGLOBIN A1C WITH EAG   
  
 05/08/2018 Lab:  LIPID PANEL   
  
 05/08/2018 Lab:  METABOLIC PANEL, COMPREHENSIVE   
  
 05/08/2018 Lab:  T4, FREE   
  
 05/08/2018 Lab:  TSH 3RD GENERATION   
  
 05/08/2018 Lab:  VITAMIN D, 25 HYDROXY Patient Instructions DASH Diet: Care Instructions Your Care Instructions The DASH diet is an eating plan that can help lower your blood pressure. DASH stands for Dietary Approaches to Stop Hypertension. Hypertension is high blood pressure.  
The DASH diet focuses on eating foods that are high in calcium, potassium, and magnesium. These nutrients can lower blood pressure. The foods that are highest in these nutrients are fruits, vegetables, low-fat dairy products, nuts, seeds, and legumes. But taking calcium, potassium, and magnesium supplements instead of eating foods that are high in those nutrients does not have the same effect. The DASH diet also includes whole grains, fish, and poultry. The DASH diet is one of several lifestyle changes your doctor may recommend to lower your high blood pressure. Your doctor may also want you to decrease the amount of sodium in your diet. Lowering sodium while following the DASH diet can lower blood pressure even further than just the DASH diet alone. Follow-up care is a key part of your treatment and safety. Be sure to make and go to all appointments, and call your doctor if you are having problems. It's also a good idea to know your test results and keep a list of the medicines you take. How can you care for yourself at home? Following the DASH diet · Eat 4 to 5 servings of fruit each day. A serving is 1 medium-sized piece of fruit, ½ cup chopped or canned fruit, 1/4 cup dried fruit, or 4 ounces (½ cup) of fruit juice. Choose fruit more often than fruit juice. · Eat 4 to 5 servings of vegetables each day. A serving is 1 cup of lettuce or raw leafy vegetables, ½ cup of chopped or cooked vegetables, or 4 ounces (½ cup) of vegetable juice. Choose vegetables more often than vegetable juice. · Get 2 to 3 servings of low-fat and fat-free dairy each day. A serving is 8 ounces of milk, 1 cup of yogurt, or 1 ½ ounces of cheese. · Eat 6 to 8 servings of grains each day. A serving is 1 slice of bread, 1 ounce of dry cereal, or ½ cup of cooked rice, pasta, or cooked cereal. Try to choose whole-grain products as much as possible. · Limit lean meat, poultry, and fish to 2 servings each day. A serving is 3 ounces, about the size of a deck of cards. · Eat 4 to 5 servings of nuts, seeds, and legumes (cooked dried beans, lentils, and split peas) each week. A serving is 1/3 cup of nuts, 2 tablespoons of seeds, or ½ cup of cooked beans or peas. · Limit fats and oils to 2 to 3 servings each day. A serving is 1 teaspoon of vegetable oil or 2 tablespoons of salad dressing. · Limit sweets and added sugars to 5 servings or less a week. A serving is 1 tablespoon jelly or jam, ½ cup sorbet, or 1 cup of lemonade. · Eat less than 2,300 milligrams (mg) of sodium a day. If you limit your sodium to 1,500 mg a day, you can lower your blood pressure even more. Tips for success · Start small. Do not try to make dramatic changes to your diet all at once. You might feel that you are missing out on your favorite foods and then be more likely to not follow the plan. Make small changes, and stick with them. Once those changes become habit, add a few more changes. · Try some of the following: ¨ Make it a goal to eat a fruit or vegetable at every meal and at snacks. This will make it easy to get the recommended amount of fruits and vegetables each day. ¨ Try yogurt topped with fruit and nuts for a snack or healthy dessert. ¨ Add lettuce, tomato, cucumber, and onion to sandwiches. ¨ Combine a ready-made pizza crust with low-fat mozzarella cheese and lots of vegetable toppings. Try using tomatoes, squash, spinach, broccoli, carrots, cauliflower, and onions. ¨ Have a variety of cut-up vegetables with a low-fat dip as an appetizer instead of chips and dip. ¨ Sprinkle sunflower seeds or chopped almonds over salads. Or try adding chopped walnuts or almonds to cooked vegetables. ¨ Try some vegetarian meals using beans and peas. Add garbanzo or kidney beans to salads. Make burritos and tacos with mashed higgins beans or black beans. Where can you learn more? Go to http://jemma-javi.info/.  
Enter C176 in the search box to learn more about \"DASH Diet: Care Instructions. \" Current as of: September 21, 2016 Content Version: 11.4 © 5643-0064 [employer]. Care instructions adapted under license by [employer] (which disclaims liability or warranty for this information). If you have questions about a medical condition or this instruction, always ask your healthcare professional. [address] any warranty or liability for your use of this information. High Cholesterol: Care Instructions Your Care Instructions Cholesterol is a type of fat in your blood. It is needed for many body functions, such as making new cells. Cholesterol is made by your body. It also comes from food you eat. High cholesterol means that you have too much of the fat in your blood. This raises your risk of a heart attack and stroke. LDL and HDL are part of your total cholesterol. LDL is the \"bad\" cholesterol. High LDL can raise your risk for heart disease, heart attack, and stroke. HDL is the \"good\" cholesterol. It helps clear bad cholesterol from the body. High HDL is linked with a lower risk of heart disease, heart attack, and stroke. Your cholesterol levels help your doctor find out your risk for having a heart attack or stroke. You and your doctor can talk about whether you need to lower your risk and what treatment is best for you. A heart-healthy lifestyle along with medicines can help lower your cholesterol and your risk. The way you choose to lower your risk will depend on how high your risk is for heart attack and stroke. It will also depend on how you feel about taking medicines. Follow-up care is a key part of your treatment and safety. Be sure to make and go to all appointments, and call your doctor if you are having problems. It's also a good idea to know your test results and keep a list of the medicines you take. How can you care for yourself at home? · Eat a variety of foods every day.  Good choices include fruits, vegetables, whole grains (like oatmeal), dried beans and peas, nuts and seeds, soy products (like tofu), and fat-free or low-fat dairy products. · Replace butter, margarine, and hydrogenated or partially hydrogenated oils with olive and canola oils. (Canola oil margarine without trans fat is fine.) · Replace red meat with fish, poultry, and soy protein (like tofu). · Limit processed and packaged foods like chips, crackers, and cookies. · Bake, broil, or steam foods. Don't sarkar them. · Be physically active. Get at least 30 minutes of exercise on most days of the week. Walking is a good choice. You also may want to do other activities, such as running, swimming, cycling, or playing tennis or team sports. · Stay at a healthy weight or lose weight by making the changes in eating and physical activity listed above. Losing just a small amount of weight, even 5 to 10 pounds, can reduce your risk for having a heart attack or stroke. · Do not smoke. When should you call for help? Watch closely for changes in your health, and be sure to contact your doctor if: 
? · You need help making lifestyle changes. ? · You have questions about your medicine. Where can you learn more? Go to http://jemma-javi.info/. Enter B067 in the search box to learn more about \"High Cholesterol: Care Instructions. \" Current as of: September 21, 2016 Content Version: 11.4 © 3406-3006 [employer]. Care instructions adapted under license by [employer] (which disclaims liability or warranty for this information). If you have questions about a medical condition or this instruction, always ask your healthcare professional. [place] [address] any warranty or liability for your use of this information. Introducing [facility]!    
 [employer] introduces [employer] patient portal. Now you can access parts of your medical record, email your doctor's office, and request medication refills online. 1. In your internet browser, go to [email or web address] 2. Click on the First Time User? Click Here link in the Sign In box. You will see the New Member Sign Up page. 3. Enter your Peers App Access Code exactly as it appears below. You will not need to use this code after youve completed the sign-up process. If you do not sign up before the expiration date, you must request a new code. · [employer]ers App Access Code: [email or web address] Expires: 5/27/2018 10:28 AM 
 
4. Enter the last four digits of your Social Security Number (xxxx) and Date of Birth (mm/dd/yyyy) as indicated and click Submit. You will be taken to the next sign-up page. 5. Create a Peers App ID. This will be your Peers App login ID and cannot be changed, so think of one that is secure and easy to remember. 6. Create a [employer] App password. You can change your password at any time. 7. Enter your Password Reset Question and Answer. This can be used at a later time if you forget your password. 8. Enter your e-mail address. You will receive e-mail notification when new information is available in [address] 9. Click Sign Up. You can now view and download portions of your medical record. 10. Click the Download Summary menu link to download a portable copy of your medical information. If you have questions, please visit the Frequently Asked Questions section of the Pe[employer] App website. Remember, Peers App is NOT to be used for urgent needs. For medical emergencies, dial 911. Now available from your iPhone and Android! Please provide this summary of care documentation to your next provider. Your primary care clinician is listed as Du Harden. If you have any questions after today's visit, please call [phone].

## 2018-05-08 NOTE — PROGRESS NOTES
Chief Complaint   Patient presents with    Follow Up Chronic Condition     1. Have you been to the ER, urgent care clinic since your last visit? Hospitalized since your last visit? No    2. Have you seen or consulted any other health care providers outside of the [address] [place] [address] since your last visit? Include any pap smears or colon screening.  Yes When: 5/7/2018 Where:  Reason for visit: steroid injections in back

## 2018-05-09 ENCOUNTER — TELEPHONE (OUTPATIENT)
Dept: FAMILY MEDICINE CLINIC | Age: 63
End: 2018-05-09

## 2018-05-09 LAB
25(OH)D3+25(OH)D2 SERPL-MCNC: 33.8 NG/ML (ref 30–100)
ALBUMIN SERPL-MCNC: 4.4 G/DL (ref 3.6–4.8)
ALBUMIN/GLOB SERPL: 1.9 {RATIO} (ref 1.2–2.2)
ALP SERPL-CCNC: 42 IU/L (ref 39–117)
ALT SERPL-CCNC: 9 IU/L (ref 0–44)
AST SERPL-CCNC: 12 IU/L (ref 0–40)
BASOPHILS # BLD AUTO: 0 X10E3/UL (ref 0–0.2)
BASOPHILS NFR BLD AUTO: 0 %
BILIRUB SERPL-MCNC: 0.4 MG/DL (ref 0–1.2)
BUN SERPL-MCNC: 38 MG/DL (ref 8–27)
BUN/CREAT SERPL: 21 (ref 10–24)
CALCIUM SERPL-MCNC: 9.5 MG/DL (ref 8.6–10.2)
CHLORIDE SERPL-SCNC: 105 MMOL/L (ref 96–106)
CHOLEST SERPL-MCNC: 205 MG/DL (ref 100–199)
CO2 SERPL-SCNC: 23 MMOL/L (ref 18–29)
CREAT SERPL-MCNC: 1.8 MG/DL (ref 0.76–1.27)
EOSINOPHIL # BLD AUTO: 0.1 X10E3/UL (ref 0–0.4)
EOSINOPHIL NFR BLD AUTO: 1 %
ERYTHROCYTE [DISTWIDTH] IN BLOOD BY AUTOMATED COUNT: 15.6 % (ref 12.3–15.4)
EST. AVERAGE GLUCOSE BLD GHB EST-MCNC: 117 MG/DL
GFR SERPLBLD CREATININE-BSD FMLA CKD-EPI: 39 ML/MIN/1.73
GFR SERPLBLD CREATININE-BSD FMLA CKD-EPI: 45 ML/MIN/1.73
GLOBULIN SER CALC-MCNC: 2.3 G/DL (ref 1.5–4.5)
GLUCOSE SERPL-MCNC: 79 MG/DL (ref 65–99)
HBA1C MFR BLD: 5.7 % (ref 4.8–5.6)
HCT VFR BLD AUTO: 43.8 % (ref 37.5–51)
HDLC SERPL-MCNC: 44 MG/DL
HGB BLD-MCNC: 14.6 G/DL (ref 13–17.7)
IMM GRANULOCYTES # BLD: 0 X10E3/UL (ref 0–0.1)
IMM GRANULOCYTES NFR BLD: 0 %
INTERPRETATION, 910389: NORMAL
INTERPRETATION: NORMAL
LDLC SERPL CALC-MCNC: 134 MG/DL (ref 0–99)
LYMPHOCYTES # BLD AUTO: 2.5 X10E3/UL (ref 0.7–3.1)
LYMPHOCYTES NFR BLD AUTO: 20 %
MCH RBC QN AUTO: 28.6 PG (ref 26.6–33)
MCHC RBC AUTO-ENTMCNC: 33.3 G/DL (ref 31.5–35.7)
MCV RBC AUTO: 86 FL (ref 79–97)
MONOCYTES # BLD AUTO: 0.7 X10E3/UL (ref 0.1–0.9)
MONOCYTES NFR BLD AUTO: 5 %
NEUTROPHILS # BLD AUTO: 9.2 X10E3/UL (ref 1.4–7)
NEUTROPHILS NFR BLD AUTO: 74 %
PDF IMAGE, 910387: NORMAL
PLATELET # BLD AUTO: 305 X10E3/UL (ref 150–379)
POTASSIUM SERPL-SCNC: 5.2 MMOL/L (ref 3.5–5.2)
PROT SERPL-MCNC: 6.7 G/DL (ref 6–8.5)
RBC # BLD AUTO: 5.1 X10E6/UL (ref 4.14–5.8)
SODIUM SERPL-SCNC: 142 MMOL/L (ref 134–144)
T4 FREE SERPL-MCNC: 1.23 NG/DL (ref 0.82–1.77)
TRIGL SERPL-MCNC: 136 MG/DL (ref 0–149)
TSH SERPL DL<=0.005 MIU/L-ACNC: 3.31 UIU/ML (ref 0.45–4.5)
VLDLC SERPL CALC-MCNC: 27 MG/DL (ref 5–40)
WBC # BLD AUTO: 12.6 X10E3/UL (ref 3.4–10.8)

## 2018-05-09 NOTE — TELEPHONE ENCOUNTER
Called [facility] and spoke with the . She states there is no Danitza Siemens that works there and there are no notes in the patient chart.  Explained to  we received a call about patient referral. She states patient has an appointment scheduled for 6/28/2018 for an initial appointment before the colonoscopy can be scheduled and she does not know why someone from the office would be calling the clinic about his referral.

## 2018-05-11 ENCOUNTER — TELEPHONE (OUTPATIENT)
Dept: FAMILY MEDICINE CLINIC | Age: 63
End: 2018-05-11

## 2018-05-11 NOTE — TELEPHONE ENCOUNTER
----- Message from Du Harden NP sent at 5/11/2018 12:07 PM EDT -----  Kidney function elevated but stable- keep all appt with nephrology    Cholesterol improved

## 2018-05-11 NOTE — TELEPHONE ENCOUNTER
Left message stating his recent labs were stable and if there were any questions to call the office.

## 2018-05-21 DIAGNOSIS — I10 ESSENTIAL HYPERTENSION: ICD-10-CM

## 2018-05-21 RX ORDER — NEBIVOLOL HYDROCHLORIDE 5 MG/1
TABLET ORAL
Qty: 30 TAB | Refills: 5 | Status: SHIPPED | OUTPATIENT
Start: 2018-05-21 | End: 2018-12-15 | Stop reason: SDUPTHER

## 2018-05-21 RX ORDER — AMLODIPINE BESYLATE 5 MG/1
TABLET ORAL
Qty: 30 TAB | Refills: 5 | Status: SHIPPED | OUTPATIENT
Start: 2018-05-21 | End: 2018-12-15 | Stop reason: SDUPTHER

## 2018-06-18 DIAGNOSIS — E78.2 MIXED HYPERLIPIDEMIA: ICD-10-CM

## 2018-06-22 RX ORDER — FENOFIBRATE 145 MG/1
TABLET, COATED ORAL
Qty: 30 TAB | Refills: 5 | Status: SHIPPED | OUTPATIENT
Start: 2018-06-22 | End: 2019-06-26 | Stop reason: SDUPTHER

## 2018-08-27 ENCOUNTER — TELEPHONE (OUTPATIENT)
Dept: FAMILY MEDICINE CLINIC | Age: 63
End: 2018-08-27

## 2018-08-28 DIAGNOSIS — I10 ESSENTIAL HYPERTENSION: Primary | ICD-10-CM

## 2018-08-28 RX ORDER — LOSARTAN POTASSIUM 100 MG/1
100 TABLET ORAL DAILY
Qty: 30 TAB | Refills: 1 | Status: SHIPPED | OUTPATIENT
Start: 2018-08-28 | End: 2019-06-26

## 2018-11-09 ENCOUNTER — OFFICE VISIT (OUTPATIENT)
Dept: FAMILY MEDICINE CLINIC | Age: 63
End: 2018-11-09

## 2018-11-09 VITALS
SYSTOLIC BLOOD PRESSURE: 136 MMHG | RESPIRATION RATE: 16 BRPM | HEIGHT: 74 IN | TEMPERATURE: 95.8 F | DIASTOLIC BLOOD PRESSURE: 76 MMHG | WEIGHT: 208 LBS | HEART RATE: 59 BPM | BODY MASS INDEX: 26.69 KG/M2

## 2018-11-09 DIAGNOSIS — F17.200 HEAVY SMOKER: ICD-10-CM

## 2018-11-09 DIAGNOSIS — Z23 ENCOUNTER FOR IMMUNIZATION: ICD-10-CM

## 2018-11-09 DIAGNOSIS — I10 ESSENTIAL HYPERTENSION: Primary | ICD-10-CM

## 2018-11-09 DIAGNOSIS — R73.03 PREDIABETES: ICD-10-CM

## 2018-11-09 DIAGNOSIS — Z12.5 SCREENING FOR PROSTATE CANCER: ICD-10-CM

## 2018-11-09 DIAGNOSIS — M67.431 GANGLION CYST OF DORSUM OF RIGHT WRIST: ICD-10-CM

## 2018-11-09 DIAGNOSIS — N18.30 CKD (CHRONIC KIDNEY DISEASE) STAGE 3, GFR 30-59 ML/MIN (HCC): ICD-10-CM

## 2018-11-09 DIAGNOSIS — E78.2 MIXED HYPERLIPIDEMIA: ICD-10-CM

## 2018-11-09 DIAGNOSIS — E66.3 OVERWEIGHT (BMI 25.0-29.9): ICD-10-CM

## 2018-11-09 DIAGNOSIS — E55.9 VITAMIN D DEFICIENCY: ICD-10-CM

## 2018-11-09 DIAGNOSIS — M24.542 CONTRACTURE OF FINGER JOINT, LEFT: ICD-10-CM

## 2018-11-09 RX ORDER — VARENICLINE TARTRATE 1 MG/1
TABLET, FILM COATED ORAL
Qty: 60 TAB | Refills: 2 | Status: SHIPPED | OUTPATIENT
Start: 2018-11-09 | End: 2019-02-07

## 2018-11-09 NOTE — PROGRESS NOTES
1. Have you been to the ER, urgent care clinic since your last visit? Hospitalized since your last visit? No.     2. Have you seen or consulted any other health care providers outside of the [address] [place] [address] since your last visit? Include any pap smears or colon screening.  No.     Chief Complaint   Patient presents with    Follow Up Chronic Condition    Hypertension    Cholesterol Problem

## 2018-11-09 NOTE — PROGRESS NOTES
Patient presents for Flu vaccination. Area prepped and injection given in the left deltoid muscle. No signs nor symptoms of adverse reaction noted during 15 minute waiting period. Patient tolerated injection well.

## 2018-11-09 NOTE — PROGRESS NOTES
Chief Complaint   Patient presents with    Follow Up Chronic Condition    Hypertension    Cholesterol Problem       This is a 59 y/o  patient who presents for follow up chronic conditions. HTN: BP good today- 136/76  No headache or dizziness    Hyperlipidemia: labs done 5/8/18 shows elevated  and Total cholesterol 134      Prediabetes: last A1c- 5.7      Patient smokes about a pack of cigarette/per day  He was advised to quit- today he request to try Chantix            ROS:   Pt denies: Wt loss, Fever/Chills, HA, Visual changes, Fatigue, Chest pain, SOB, ALMANZAR, Abd pain, N/V/D/C, Blood in stool or urine, Edema. Pertinent positive as above in HPI. All others were negative         Past Medical History:   Diagnosis Date    Hypercholesterolemia     Hypertension      No past surgical history on file. Family History   Problem Relation Age of Onset    Diabetes Mother     Heart Disease Mother     Stroke Father     Diabetes Maternal Grandmother          Social History     Socioeconomic History    Marital status:      Spouse name: Not on file    Number of children: Not on file    Years of education: Not on file    Highest education level: Not on file   Social Needs    Financial resource strain: Not on file    Food insecurity - worry: Not on file    Food insecurity - inability: Not on file   [employer] needs - medical: Not on file   [employer] needs - non-medical: Not on file   Occupational History    Not on file   Tobacco Use    Smoking status: Current Every Day Smoker     Packs/day: 1.00     Types: Cigarettes    Smokeless tobacco: Never Used   Substance and Sexual Activity    Alcohol use: No    Drug use: No    Sexual activity: Yes     Partners: Female   Other Topics Concern    Not on file   Social History Narrative    Not on file     Current Outpatient Medications   Medication Sig Dispense Refill    losartan (COZAAR) 100 mg tablet Take 1 Tab by mouth daily.  30 Tab 1  fenofibrate nanocrystallized (TRICOR) 145 mg tablet take 1 tablet by mouth once daily 30 Tab 5    amLODIPine (NORVASC) 5 mg tablet take 1 tablet by mouth once daily 30 Tab 5    BYSTOLIC 5 mg tablet take 1 tablet by mouth once daily 30 Tab 5    clopidogrel (PLAVIX) 75 mg tab take 1 tablet by mouth once daily (Patient taking differently: 1 tablets 3 days a week) 30 Tab 5    cetirizine (ZYRTEC) 10 mg tablet Take 1 Tab by mouth daily. 30 Tab 2    fluticasone (FLONASE) 50 mcg/actuation nasal spray 1 spray into each nostril daily 1 Bottle 6    Omega-3 Fatty Acids (FISH OIL) 500 mg cap Take  by mouth.  aspirin 81 mg chewable tablet Take 81 mg by mouth daily.  VITAMIN D2 50,000 unit capsule take 1 capsule by mouth every week 12 Cap 1    ascorbic acid, vitamin C, (VITAMIN C) 500 mg tablet Take  by mouth. Allergies   Allergen Reactions    Simvastatin Other (comments)         Physical Exam:    Vital Signs:   Visit Vitals  /76   Pulse (!) 59   Temp 95.8 °F (35.4 °C) (Oral)   Resp 16   Ht 6' 2\" (1.88 m)   Wt 208 lb (94.3 kg)   BMI 26.71 kg/m²           General: a, a & o x 3, afebrile, interacting appropriately, in no acute distress  HEENT: head normocephalic and atraumatic, conjuctiva clear  Skin: color race-appropriate, warm and dry, no rashes , no bruises  Neck: supple, symmetrical, no palpable mass, no thyromegaly  Respiratory: symmetrical chest expansion, lung sounds clear,  no wheezes or crackles  Cardiovascular: normal S1S2, regular rate and rhythm, no murmurs  Musculoskeletal: normal ROM on all joints, no swelling or deformity  Lymphatic: no cervical lymphadenopathy   Neurological: sensation and motor function intact  Psychological: a, a & o x 3, appropriate mood and affect, no thought disorder      Assessment/Plan:      ICD-10-CM ICD-9-CM    1. Essential hypertension ( controlled) I10 401.9 CBC WITH AUTOMATED DIFF   continue current medication   2.  Encounter for immunization Z23 V03.89 INFLUENZA VIRUS VAC QUAD,SPLIT,PRESV FREE SYRINGE IM   3. Mixed hyperlipidemia E78.2 272.2 TSH 3RD GENERATION      LIPID PANEL  Continue to take Fenofibrate as prescribed. Will repeat lab in 6 months   4. Heavy smoker F17.200 305.1 varenicline (CHANTIX) 1 mg tablet     medication side effect such as suicidality, depression and neuropsychiatric disorder discussed    Patient advised to discontinue med if this occur and call our office. 5. CKD (chronic kidney disease) stage 3, GFR 30-59 ml/min (HCC) N18.3 585. 3 Followed by Nephrology   6. Overweight (BMI 25.0-29. 9) E66.3 278.02    7. Prediabetes Z11.20 184.46 METABOLIC PANEL, COMPREHENSIVE      HEMOGLOBIN A1C WITH EAG   8. Vitamin D deficiency E55.9 268.9 VITAMIN D, 25 HYDROXY   9. Screening for prostate cancer Z12.5 V76.44 PSA SCREENING   10. Ganglion cyst of dorsum of right wrist M67.431 727.41 Decline referral to hand surgery   11. Contracture of finger joint, left M24.542 718.44 Decline referral today                   Additional Notes: Discussed today's diagnosis, treatment plans. Discussed medication indications and side effects. After Visit Summary: Provided and discussed printed patient instructions. Answered questions in relation to today's diagnosis.   Follow-up Disposition:  3 months              ANABELLE Kumar  [address] [place] [address],[address]              Orders Placed This Encounter    Influenza virus vaccine (QUADRIVALENT PRES FREE SYRINGE) IM ([id number])

## 2018-11-09 NOTE — PATIENT INSTRUCTIONS
Hyperlipidemia: After Your Visit  Your Care Instructions  Hyperlipidemia is too much fat in your blood. The body has several kinds of fat, including cholesterol and triglycerides. Your body needs fat for many things, such as making new cells. But too much fat in your blood increases your chances of having a heart attack or stroke. You may be able to lower your cholesterol and triglycerides with a heart-healthy diet, exercise, and if needed, medicine. Your doctor may want you to try lifestyle changes first to see whether they lower the fat in your blood. You may need to take medicine if lifestyle changes do not lower the fat in your blood enough. Follow-up care is a key part of your treatment and safety. Be sure to make and go to all appointments, and call your doctor if you are having problems. Its also a good idea to know your test results and keep a list of the medicines you take. How can you care for yourself at home? Take your medicines  · Take your medicines exactly as prescribed. Call your doctor if you think you are having a problem with your medicine. · If you take medicine to lower your cholesterol, go to follow-up visits. You will need to have blood tests. · Do not take large doses of niacin, which is a B vitamin, while taking medicine called statins. It may increase the chance of muscle pain and liver problems. · Talk to your doctor about avoiding grapefruit juice if you are taking statins. Grapefruit juice can raise the level of this medicine in your blood. This could increase side effects. Eat more fruits, vegetables, and fiber  · Fruits and vegetables have lots of nutrients that help protect against heart disease, and they have little--if any--fat. Try to eat at least five servings a day. Dark green, deep orange, or yellow fruits and vegetables are healthy choices. · Keep carrots, celery, and other veggies handy for snacks.  Buy fruit that is in season and store it where you can see it so that you will be tempted to eat it. Cook dishes that have a lot of veggies in them, such as stir-fries and soups. · Foods high in fiber may reduce your cholesterol and provide important vitamins and minerals. High-fiber foods include whole-grain cereals and breads, oatmeal, beans, brown rice, citrus fruits, and apples. · Buy whole-grain breads and cereals instead of white bread and pastries. Limit saturated fat  · Read food labels and try to avoid saturated fat and trans fat. They increase your risk of heart disease. · Use olive or canola oil when you cook. Try cholesterol-lowering spreads, such as Benecol or Take Control. · Bake, broil, grill, or steam foods instead of frying them. · Limit the amount of high-fat meats you eat, including hot dogs and sausages. Cut out all visible fat when you prepare meat. · Eat fish, skinless poultry, and soy products such as tofu instead of high-fat meats. Soybeans may be especially good for your heart. Eat at least two servings of fish a week. Certain fish, such as salmon, contain omega-3 fatty acids, which may help reduce your risk of heart attack. · Choose low-fat or fat-free milk and dairy products. Get exercise, limit alcohol, and quit smoking  · Get more exercise. Work with your doctor to set up an exercise program. Even if you can do only a small amount, exercise will help you get stronger, have more energy, and manage your weight and your stress. Walking is an easy way to get exercise. Gradually increase the amount you walk every day. Aim for at least 30 minutes on most days of the week. You also may want to swim, bike, or do other activities. · Limit alcohol to no more than 2 drinks a day for men and 1 drink a day for women. · Do not smoke. If you need help quitting, talk to your doctor about stop-smoking programs and medicines. These can increase your chances of quitting for good. When should you call for help?   Call 911 anytime you think you may need emergency care. For example, call if:  · You have symptoms of a heart attack. These may include:  ¨ Chest pain or pressure, or a strange feeling in the chest.  ¨ Sweating. ¨ Shortness of breath. ¨ Nausea or vomiting. ¨ Pain, pressure, or a strange feeling in the back, neck, jaw, or upper belly or in one or both shoulders or arms. ¨ Lightheadedness or sudden weakness. ¨ A fast or irregular heartbeat. After you call 911, the  may tell you to chew 1 adult-strength or 2 to 4 low-dose aspirin. Wait for an ambulance. Do not try to drive yourself. · You have signs of a stroke. These may include:  ¨ Sudden numbness, paralysis, or weakness in your face, arm, or leg, especially on only one side of your body. ¨ New problems with walking or balance. ¨ Sudden vision changes. ¨ Drooling or slurred speech. ¨ New problems speaking or understanding simple statements, or feeling confused. ¨ A sudden, severe headache that is different from past headaches. · You passed out (lost consciousness). Call your doctor now or seek immediate medical care if:  · You have muscle pain or weakness. Watch closely for changes in your health, and be sure to contact your doctor if:  · You are very tired. · You have an upset stomach, gas, constipation, or belly pain or cramps. Where can you learn more? Go to [employer]  Enter C406 in the search box to learn more about \"Hyperlipidemia: After Your Visit. \"   © 7031-4071 [employer]. Care instructions adapted under license by [facility] (which disclaims liability or warranty for this information). This care instruction is for use with your licensed healthcare professional. If you have questions about a medical condition or this instruction, always ask your healthcare professional. [place] [address] any warranty or liability for your use of this information.   Content Version: 5.8.286719; Last Revised: October 13, 2011

## 2018-11-10 LAB
25(OH)D3+25(OH)D2 SERPL-MCNC: 27.5 NG/ML (ref 30–100)
ALBUMIN SERPL-MCNC: 4.6 G/DL (ref 3.6–4.8)
ALBUMIN/GLOB SERPL: 1.9 {RATIO} (ref 1.2–2.2)
ALP SERPL-CCNC: 54 IU/L (ref 39–117)
ALT SERPL-CCNC: 15 IU/L (ref 0–44)
AST SERPL-CCNC: 23 IU/L (ref 0–40)
BASOPHILS # BLD AUTO: 0.1 X10E3/UL (ref 0–0.2)
BASOPHILS NFR BLD AUTO: 1 %
BILIRUB SERPL-MCNC: 0.2 MG/DL (ref 0–1.2)
BUN SERPL-MCNC: 33 MG/DL (ref 8–27)
BUN/CREAT SERPL: 19 (ref 10–24)
CALCIUM SERPL-MCNC: 9.6 MG/DL (ref 8.6–10.2)
CHLORIDE SERPL-SCNC: 105 MMOL/L (ref 96–106)
CHOLEST SERPL-MCNC: 204 MG/DL (ref 100–199)
CO2 SERPL-SCNC: 20 MMOL/L (ref 20–29)
CREAT SERPL-MCNC: 1.72 MG/DL (ref 0.76–1.27)
EOSINOPHIL # BLD AUTO: 0.2 X10E3/UL (ref 0–0.4)
EOSINOPHIL NFR BLD AUTO: 2 %
ERYTHROCYTE [DISTWIDTH] IN BLOOD BY AUTOMATED COUNT: 13.7 % (ref 12.3–15.4)
EST. AVERAGE GLUCOSE BLD GHB EST-MCNC: 123 MG/DL
GLOBULIN SER CALC-MCNC: 2.4 G/DL (ref 1.5–4.5)
GLUCOSE SERPL-MCNC: 87 MG/DL (ref 65–99)
HBA1C MFR BLD: 5.9 % (ref 4.8–5.6)
HCT VFR BLD AUTO: 46 % (ref 37.5–51)
HDLC SERPL-MCNC: 39 MG/DL
HGB BLD-MCNC: 15.1 G/DL (ref 13–17.7)
IMM GRANULOCYTES # BLD: 0 X10E3/UL (ref 0–0.1)
IMM GRANULOCYTES NFR BLD: 0 %
INTERPRETATION, 910389: NORMAL
INTERPRETATION: NORMAL
LDLC SERPL CALC-MCNC: 141 MG/DL (ref 0–99)
LYMPHOCYTES # BLD AUTO: 2.3 X10E3/UL (ref 0.7–3.1)
LYMPHOCYTES NFR BLD AUTO: 27 %
MCH RBC QN AUTO: 28.4 PG (ref 26.6–33)
MCHC RBC AUTO-ENTMCNC: 32.8 G/DL (ref 31.5–35.7)
MCV RBC AUTO: 87 FL (ref 79–97)
MONOCYTES # BLD AUTO: 0.7 X10E3/UL (ref 0.1–0.9)
MONOCYTES NFR BLD AUTO: 9 %
NEUTROPHILS # BLD AUTO: 5.1 X10E3/UL (ref 1.4–7)
NEUTROPHILS NFR BLD AUTO: 61 %
PDF IMAGE, 910387: NORMAL
PLATELET # BLD AUTO: 250 X10E3/UL (ref 150–379)
POTASSIUM SERPL-SCNC: 4.8 MMOL/L (ref 3.5–5.2)
PROT SERPL-MCNC: 7 G/DL (ref 6–8.5)
RBC # BLD AUTO: 5.32 X10E6/UL (ref 4.14–5.8)
SODIUM SERPL-SCNC: 142 MMOL/L (ref 134–144)
TRIGL SERPL-MCNC: 122 MG/DL (ref 0–149)
TSH SERPL DL<=0.005 MIU/L-ACNC: 3.13 UIU/ML (ref 0.45–4.5)
VLDLC SERPL CALC-MCNC: 24 MG/DL (ref 5–40)
WBC # BLD AUTO: 8.3 X10E3/UL (ref 3.4–10.8)

## 2018-11-14 NOTE — PROGRESS NOTES
Vit d low- advise pt to take 1000 units daily  Kidney function still elevated but stable- advise patient to follow up with nephrology  Lipids elevated - continue current medication  a1c elevated 5.9- low carb  And sweets

## 2018-11-15 NOTE — PROGRESS NOTES
Patient made aware of lab results and instructions to take vitamin d 1000 units and follow a low carb diet. Patient verbalized understanding.

## 2018-12-11 RX ORDER — VALSARTAN 320 MG/1
TABLET ORAL
Qty: 30 TAB | Refills: 5 | Status: SHIPPED | OUTPATIENT
Start: 2018-12-11 | End: 2019-06-26 | Stop reason: SDUPTHER

## 2018-12-15 DIAGNOSIS — I10 ESSENTIAL HYPERTENSION: ICD-10-CM

## 2018-12-19 RX ORDER — AMLODIPINE BESYLATE 5 MG/1
TABLET ORAL
Qty: 30 TAB | Refills: 5 | Status: SHIPPED | OUTPATIENT
Start: 2018-12-19 | End: 2019-06-26 | Stop reason: SDUPTHER

## 2018-12-19 RX ORDER — NEBIVOLOL HYDROCHLORIDE 5 MG/1
TABLET ORAL
Qty: 30 TAB | Refills: 5 | Status: SHIPPED | OUTPATIENT
Start: 2018-12-19 | End: 2019-06-26 | Stop reason: SDUPTHER

## 2019-01-16 DIAGNOSIS — I10 ESSENTIAL HYPERTENSION: ICD-10-CM

## 2019-01-17 RX ORDER — CLOPIDOGREL BISULFATE 75 MG/1
TABLET ORAL
Qty: 30 TAB | Refills: 5 | Status: SHIPPED | OUTPATIENT
Start: 2019-01-17 | End: 2019-06-26 | Stop reason: SDUPTHER

## 2019-02-08 ENCOUNTER — OFFICE VISIT (OUTPATIENT)
Dept: FAMILY MEDICINE CLINIC | Age: 64
End: 2019-02-08

## 2019-02-08 VITALS
TEMPERATURE: 95.9 F | RESPIRATION RATE: 16 BRPM | HEART RATE: 60 BPM | SYSTOLIC BLOOD PRESSURE: 133 MMHG | HEIGHT: 74 IN | DIASTOLIC BLOOD PRESSURE: 75 MMHG | BODY MASS INDEX: 28.11 KG/M2 | WEIGHT: 219 LBS

## 2019-02-08 DIAGNOSIS — Z13.5 SCREENING FOR GLAUCOMA: ICD-10-CM

## 2019-02-08 DIAGNOSIS — Z23 ENCOUNTER FOR IMMUNIZATION: ICD-10-CM

## 2019-02-08 DIAGNOSIS — R73.03 PREDIABETES: ICD-10-CM

## 2019-02-08 DIAGNOSIS — Z12.5 SCREENING FOR PROSTATE CANCER: ICD-10-CM

## 2019-02-08 DIAGNOSIS — N18.30 CKD (CHRONIC KIDNEY DISEASE) STAGE 3, GFR 30-59 ML/MIN (HCC): ICD-10-CM

## 2019-02-08 DIAGNOSIS — E55.9 VITAMIN D DEFICIENCY: ICD-10-CM

## 2019-02-08 DIAGNOSIS — E78.2 MIXED HYPERLIPIDEMIA: ICD-10-CM

## 2019-02-08 DIAGNOSIS — I10 ESSENTIAL HYPERTENSION: Primary | ICD-10-CM

## 2019-02-08 RX ORDER — GLUCOSAMINE SULFATE 1500 MG
2000 POWDER IN PACKET (EA) ORAL EVERY OTHER DAY
COMMUNITY
End: 2019-12-27 | Stop reason: SDUPTHER

## 2019-02-08 NOTE — PROGRESS NOTES
Chief Complaint Patient presents with  Follow Up Chronic Condition  Hypertension  Cholesterol Problem This is a 57 y/o  patient who presents for follow up chronic conditions. HTN: BP good today No headache or dizziness BP Readings from Last 3 Encounters:  
02/08/19 133/75  
11/09/18 136/76  
05/08/18 115/74 Hyperlipidemia: labs done 5/8/18 shows elevated  and Total cholesterol 134 Lab Results Component Value Date/Time Cholesterol, total 204 (H) 02/08/2019 08:37 AM  
 Cholesterol, total 204 (H) 11/09/2018 12:00 AM  
 Cholesterol, total 205 (H) 05/08/2018 08:42 AM  
 Cholesterol, total 242 (H) 11/20/2017 12:00 AM  
 Cholesterol, total 225 (H) 05/24/2017 03:17 AM  
 HDL Cholesterol 48 02/08/2019 08:37 AM  
 HDL Cholesterol 39 (L) 11/09/2018 12:00 AM  
 HDL Cholesterol 44 05/08/2018 08:42 AM  
 HDL Cholesterol 45 11/20/2017 12:00 AM  
 HDL Cholesterol 40 05/24/2017 03:17 AM  
 LDL, calculated 138 (H) 02/08/2019 08:37 AM  
 LDL, calculated 141 (H) 11/09/2018 12:00 AM  
 LDL, calculated 134 (H) 05/08/2018 08:42 AM  
 LDL, calculated 169 (H) 11/20/2017 12:00 AM  
 LDL, calculated 160 (H) 05/24/2017 03:17 AM  
 Triglyceride 90 02/08/2019 08:37 AM  
 Triglyceride 122 11/09/2018 12:00 AM  
 Triglyceride 136 05/08/2018 08:42 AM  
 Triglyceride 140 11/20/2017 12:00 AM  
 Triglyceride 127 05/24/2017 03:17 AM  
 
Prediabetes: last A1c- 5.7 Lab Results Component Value Date/Time Hemoglobin A1c 6.1 (H) 02/08/2019 08:37 AM  
 Hemoglobin A1c 5.9 (H) 11/09/2018 12:00 AM  
 Hemoglobin A1c 5.7 (H) 05/08/2018 08:42 AM  
 
 
 
Results for orders placed or performed in visit on 02/08/19 CBC WITH AUTOMATED DIFF Result Value Ref Range WBC 7.9 3.4 - 10.8 x10E3/uL  
 RBC 5.02 4.14 - 5.80 x10E6/uL HGB 14.4 13.0 - 17.7 g/dL HCT 42.5 37.5 - 51.0 % MCV 85 79 - 97 fL  
 MCH 28.7 26.6 - 33.0 pg  
 MCHC 33.9 31.5 - 35.7 g/dL  
 RDW 15.2 12.3 - 15.4 % PLATELET 411 714 - 069 x10E3/uL NEUTROPHILS 62 Not Estab. % Lymphocytes 25 Not Estab. % MONOCYTES 9 Not Estab. % EOSINOPHILS 3 Not Estab. % BASOPHILS 1 Not Estab. %  
 ABS. NEUTROPHILS 5.0 1.4 - 7.0 x10E3/uL Abs Lymphocytes 2.0 0.7 - 3.1 x10E3/uL  
 ABS. MONOCYTES 0.7 0.1 - 0.9 x10E3/uL  
 ABS. EOSINOPHILS 0.3 0.0 - 0.4 x10E3/uL  
 ABS. BASOPHILS 0.0 0.0 - 0.2 x10E3/uL IMMATURE GRANULOCYTES 0 Not Estab. %  
 ABS. IMM. GRANS. 0.0 0.0 - 0.1 x10E3/uL METABOLIC PANEL, COMPREHENSIVE Result Value Ref Range Glucose 79 65 - 99 mg/dL BUN 35 (H) 8 - 27 mg/dL Creatinine 2.02 (H) 0.76 - 1.27 mg/dL GFR est non-AA 34 (L) >59 mL/min/1.73 GFR est AA 39 (L) >59 mL/min/1.73  
 BUN/Creatinine ratio 17 10 - 24 Sodium 141 134 - 144 mmol/L Potassium 5.8 (H) 3.5 - 5.2 mmol/L Chloride 104 96 - 106 mmol/L  
 CO2 21 20 - 29 mmol/L Calcium 9.6 8.6 - 10.2 mg/dL Protein, total 7.2 6.0 - 8.5 g/dL Albumin 4.5 3.6 - 4.8 g/dL GLOBULIN, TOTAL 2.7 1.5 - 4.5 g/dL A-G Ratio 1.7 1.2 - 2.2 Bilirubin, total 0.7 0.0 - 1.2 mg/dL Alk. phosphatase 51 39 - 117 IU/L  
 AST (SGOT) 21 0 - 40 IU/L  
 ALT (SGPT) 19 0 - 44 IU/L  
LIPID PANEL Result Value Ref Range Cholesterol, total 204 (H) 100 - 199 mg/dL Triglyceride 90 0 - 149 mg/dL HDL Cholesterol 48 >39 mg/dL VLDL, calculated 18 5 - 40 mg/dL LDL, calculated 138 (H) 0 - 99 mg/dL CVD REPORT Result Value Ref Range INTERPRETATION Note PDF IMAGE Not applicable CKD REPORT Result Value Ref Range Interpretation Note HEMOGLOBIN A1C WITH EAG Result Value Ref Range Hemoglobin A1c 6.1 (H) 4.8 - 5.6 % Estimated average glucose 128 mg/dL TSH 3RD GENERATION Result Value Ref Range TSH 3.460 0.450 - 4.500 uIU/mL VITAMIN D, 25 HYDROXY Result Value Ref Range VITAMIN D, 25-HYDROXY 24.7 (L) 30.0 - 100.0 ng/mL ROS:   Pt denies: Wt loss, Fever/Chills, HA, Visual changes, Fatigue, Chest pain, SOB, ALMANZAR, Abd pain, N/V/D/C, Blood in stool or urine, Edema. Pertinent positive as above in HPI. All others were negative Past Medical History:  
Diagnosis Date  Hypercholesterolemia  Hypertension No past surgical history on file. Family History Problem Relation Age of Onset  Diabetes Mother  Heart Disease Mother  Stroke Father  Diabetes Maternal Grandmother Social History Socioeconomic History  Marital status:  Spouse name: Not on file  Number of children: Not on file  Years of education: Not on file  Highest education level: Not on file Social Needs  Financial resource strain: Not on file  Food insecurity - worry: Not on file  Food insecurity - inability: Not on file  Transportation needs - medical: Not on file  Transportation needs - non-medical: Not on file Occupational History  Not on file Tobacco Use  Smoking status: Current Every Day Smoker Packs/day: 1.00 Types: Cigarettes  Smokeless tobacco: Never Used Substance and Sexual Activity  Alcohol use: No  
 Drug use: No  
 Sexual activity: Yes  
  Partners: Female Other Topics Concern  Not on file Social History Narrative  Not on file Current Outpatient Medications Medication Sig Dispense Refill  cholecalciferol (VITAMIN D3) 1,000 unit cap Take 2,000 Units by mouth every other day.  clopidogrel (PLAVIX) 75 mg tab take 1 tablet by mouth once daily 30 Tab 5  
 BYSTOLIC 5 mg tablet take 1 tablet by mouth once daily 30 Tab 5  
 amLODIPine (NORVASC) 5 mg tablet take 1 tablet by mouth once daily 30 Tab 5  
 valsartan (DIOVAN) 320 mg tablet take 1 tablet by mouth once daily 30 Tab 5  
 losartan (COZAAR) 100 mg tablet Take 1 Tab by mouth daily. 30 Tab 1  
 fenofibrate nanocrystallized (TRICOR) 145 mg tablet take 1 tablet by mouth once daily 30 Tab 5  cetirizine (ZYRTEC) 10 mg tablet Take 1 Tab by mouth daily. 30 Tab 2  
 fluticasone (FLONASE) 50 mcg/actuation nasal spray 1 spray into each nostril daily 1 Bottle 6  
 Omega-3 Fatty Acids (FISH OIL) 500 mg cap Take  by mouth.  aspirin 81 mg chewable tablet Take 81 mg by mouth daily.  ascorbic acid, vitamin C, (VITAMIN C) 500 mg tablet Take  by mouth.  VITAMIN D2 50,000 unit capsule take 1 capsule by mouth every week 12 Cap 1 Allergies Allergen Reactions  Simvastatin Other (comments) Physical Exam: 
 
Vital Signs:  
Visit Vitals /75 Pulse 60 Temp 95.9 °F (35.5 °C) (Oral) Resp 16 Ht 6' 2\" (1.88 m) Wt 219 lb (99.3 kg) BMI 28.12 kg/m² General: a, a & o x 3, afebrile, interacting appropriately, in no acute distress HEENT: head normocephalic and atraumatic, conjuctiva clear Skin: color race-appropriate, warm and dry, no rashes , no bruises Neck: supple, symmetrical, no palpable mass, no thyromegaly Respiratory: symmetrical chest expansion, lung sounds clear,  no wheezes or crackles Cardiovascular: normal S1S2, regular rate and rhythm, no murmurs Musculoskeletal: normal ROM on all joints, no swelling or deformity Lymphatic: no cervical lymphadenopathy Neurological: sensation and motor function intact Psychological: a, a & o x 3, appropriate mood and affect, no thought disorder Assessment/Plan: ICD-10-CM ICD-9-CM 1. Essential hypertension I10 401.9 CBC WITH AUTOMATED DIFF 2. Mixed hyperlipidemia E78.2 272.2 LIPID PANEL  
   TSH 3RD GENERATION 3. CKD (chronic kidney disease) stage 3, GFR 30-59 ml/min ([facility]) M99.4 662.3 METABOLIC PANEL, COMPREHENSIVE 4. Prediabetes R58.62 564.31 METABOLIC PANEL, COMPREHENSIVE  
   HEMOGLOBIN A1C WITH EAG  
5. Vitamin D deficiency E55.9 268.9 VITAMIN D, 25 HYDROXY 6. Encounter for immunization Z23 V03.89 varicella-zoster recombinant, PF, (SHINGRIX, PF,) 50 mcg/0.5 mL susr injection ADMIN PNEUMOCOCCAL VACCINE  
   PNEUMOCOCCAL CONJ VACCINE 13 VALENT IM  
7. Screening for glaucoma Z13.5 V80.1 REFERRAL TO OPHTHALMOLOGY 8. Screening for prostate cancer Z12.5 V76.44 PSA SCREENING Additional Notes: Discussed today's diagnosis, treatment plans. Discussed medication indications and side effects. After Visit Summary: Provided and discussed printed patient instructions. Answered questions in relation to today's diagnosis. Follow-up Disposition:  3 months Tawnya Piña NP-BC [facility] [address] Orders Placed This Encounter  Pneumococcal Conjugate Vaccine  LIPID PANEL  
 TSH 3RD GENERATION  
 CBC WITH AUTOMATED DIFF  
 VITAMIN D, 25 HYDROXY  METABOLIC PANEL, COMPREHENSIVE  
 HEMOGLOBIN A1C WITH EAG  
 CBC WITH AUTOMATED DIFF  
 METABOLIC PANEL, COMPREHENSIVE  LIPID PANEL  
 CVD REPORT  CKD REPORT  
 HEMOGLOBIN A1C WITH EAG  
 TSH 3RD GENERATION  
 VITAMIN D, 25 HYDROXY  REFERRAL TO OPHTHALMOLOGY  Pneumococcal Admin ()  cholecalciferol (VITAMIN D3) 1,000 unit cap  varicella-zoster recombinant, PF, (SHINGRIX, PF,) 50 mcg/0.5 mL susr injection

## 2019-02-08 NOTE — PROGRESS NOTES
After obtaining consent, and per orders of Rohan Carballo NP, injection of 0.5ml Pnemoccal 13 given by Migue Stewart to left delotid . Patient instructed to remain in clinic for 20 minutes afterwards, and to report any adverse reaction to me immediately. Patient tolerated well.

## 2019-02-08 NOTE — PROGRESS NOTES
1. Have you been to the ER, urgent care clinic since your last visit? Hospitalized since your last visit? 2. Have you seen or consulted any other health care providers outside of the [address] [place] [address] since your last visit? Include any pap smears or colon screening. Chief Complaint Patient presents with  Follow Up Chronic Condition  Hypertension  Cholesterol Problem

## 2019-02-08 NOTE — PATIENT INSTRUCTIONS
Medicare Wellness Visit, Male The best way to live healthy is to have a lifestyle where you eat a well-balanced diet, exercise regularly, limit alcohol use, and quit all forms of tobacco/nicotine, if applicable. Regular preventive services are another way to keep healthy. Preventive services (vaccines, screening tests, monitoring & exams) can help personalize your care plan, which helps you manage your own care. Screening tests can find health problems at the earliest stages, when they are easiest to treat. [facility] follows the current, evidence-based guidelines published by the [facility] ([facility]) when recommending preventive services for our patients. Because we follow these guidelines, sometimes recommendations change over time as research supports it. (For example, a prostate screening blood test is no longer routinely recommended for men with no symptoms.) Of course, you and your doctor may decide to screen more often for some diseases, based on your risk and co-morbidities (chronic disease you are already diagnosed with). Preventive services for you include: - Medicare offers their members a free annual wellness visit, which is time for you and your primary care provider to discuss and plan for your preventive service needs. Take advantage of this benefit every year! 
-All adults over age 72 should receive the recommended pneumonia vaccines. Current USPSTF guidelines recommend a series of two vaccines for the best pneumonia protection.  
-All adults should have a flu vaccine yearly and an ECG.  All adults age 61 and older should receive a shingles vaccine once in their lifetime.   
-All adults age 38-68 who are overweight should have a diabetes screening test once every three years.  
-Other screening tests & preventive services for persons with diabetes include: an eye exam to screen for diabetic retinopathy, a kidney function test, a foot exam, and stricter control over your cholesterol.  
-Cardiovascular screening for adults with routine risk involves an electrocardiogram (ECG) at intervals determined by the provider.  
-Colorectal cancer screening should be done for adults age 54-65 with no increased risk factors for colorectal cancer. There are a number of acceptable methods of screening for this type of cancer. Each test has its own benefits and drawbacks. Discuss with your provider what is most appropriate for you during your annual wellness visit. The different tests include: colonoscopy (considered the best screening method), a fecal occult blood test, a fecal DNA test, and sigmoidoscopy. 
-All adults born between [facility] should be screened once for Hepatitis C. 
-An Abdominal Aortic Aneurysm (AAA) Screening is recommended for men age 73-68 who has ever smoked in their lifetime. Here is a list of your current Health Maintenance items (your personalized list of preventive services) with a due date: 
Health Maintenance Due Topic Date Due  Shingles Vaccine (1 of 2) 01/12/2004  Glaucoma Screening   01/12/2019  Pneumococcal Vaccine (1 of 2 - PCV13) 01/12/2019 High Cholesterol: Care Instructions Your Care Instructions Cholesterol is a type of fat in your blood. It is needed for many body functions, such as making new cells. Cholesterol is made by your body. It also comes from food you eat. High cholesterol means that you have too much of the fat in your blood. This raises your risk of a heart attack and stroke. LDL and HDL are part of your total cholesterol. LDL is the \"bad\" cholesterol. High LDL can raise your risk for heart disease, heart attack, and stroke. HDL is the \"good\" cholesterol. It helps clear bad cholesterol from the body. High HDL is linked with a lower risk of heart disease, heart attack, and stroke.  
Your cholesterol levels help your doctor find out your risk for having a heart attack or stroke. You and your doctor can talk about whether you need to lower your risk and what treatment is best for you. A heart-healthy lifestyle along with medicines can help lower your cholesterol and your risk. The way you choose to lower your risk will depend on how high your risk is for heart attack and stroke. It will also depend on how you feel about taking medicines. Follow-up care is a key part of your treatment and safety. Be sure to make and go to all appointments, and call your doctor if you are having problems. It's also a good idea to know your test results and keep a list of the medicines you take. How can you care for yourself at home? · Eat a variety of foods every day. Good choices include fruits, vegetables, whole grains (like oatmeal), dried beans and peas, nuts and seeds, soy products (like tofu), and fat-free or low-fat dairy products. · Replace butter, margarine, and hydrogenated or partially hydrogenated oils with olive and canola oils. (Canola oil margarine without trans fat is fine.) · Replace red meat with fish, poultry, and soy protein (like tofu). · Limit processed and packaged foods like chips, crackers, and cookies. · Bake, broil, or steam foods. Don't sarkar them. · Be physically active. Get at least 30 minutes of exercise on most days of the week. Walking is a good choice. You also may want to do other activities, such as running, swimming, cycling, or playing tennis or team sports. · Stay at a healthy weight or lose weight by making the changes in eating and physical activity listed above. Losing just a small amount of weight, even 5 to 10 pounds, can reduce your risk for having a heart attack or stroke. · Do not smoke. When should you call for help? Watch closely for changes in your health, and be sure to contact your doctor if: 
  · You need help making lifestyle changes.  
  · You have questions about your medicine. Where can you learn more? Go to http://jemma-javi.info/. Enter E318 in the search box to learn more about \"High Cholesterol: Care Instructions. \" Current as of: July 22, 2018 Content Version: 11.9 © 2006-2018 [employer]. Care instructions adapted under license by [employer] (which disclaims liability or warranty for this information). If you have questions about a medical condition or this instruction, always ask your healthcare professional. [place] [address] any warranty or liability for your use of this information. Pneumococcal Conjugate Vaccine for Children: Care Instructions Your Care Instructions The pneumococcal shot (PCV13) protects against a type of bacteria that causes pneumonia, meningitis, blood infections (sepsis), and ear infections. All children need four dosesone at age 2 months, one at 4 months, one at 7 months, and one at 15 to 17 months. If your child does not get the shots in this time frame, ask your doctor about a schedule for catch-up shots. The shot may cause pain or swelling in the area where the shot is given. It may cause your child to feel sleepy or not feel like eating or cause a fever. These reactions may last 1 to 2 days. Follow-up care is a key part of your child's treatment and safety. Be sure to make and go to all appointments, and call your doctor if your child is having problems. It's also a good idea to know your child's test results and keep a list of the medicines your child takes. How can you care for your child at home? · Give your child acetaminophen (Tylenol) or ibuprofen (Advil, Motrin) for fever or for pain at the shot area. Be safe with medicines. Read and follow all instructions on the label. Do not give aspirin to anyone younger than 20. It has been linked to Reye syndrome, a serious illness.  
· Do not give a child two or more pain medicines at the same time unless the doctor told you to. Many pain medicines have acetaminophen, which is Tylenol. Too much acetaminophen (Tylenol) can be harmful. · Put ice or a cold pack on the sore area for 10 to 20 minutes at a time. Put a thin cloth between the ice and your child's skin. When should you call for help? Call 911 anytime you think your child may need emergency care. For example, call if: 
  · Your child has a seizure.  
  · Your child has symptoms of a severe allergic reaction. These may include: 
? Sudden raised, red areas (hives) all over the body. ? Swelling of the throat, mouth, lips, or tongue. ? Trouble breathing. ? Passing out (losing consciousness). Or your child may feel very lightheaded or suddenly feel weak, confused, or restless.  
 Call your doctor now or seek immediate medical care if: 
  · Your child has symptoms of an allergic reaction, such as: ? A rash or hives (raised, red areas on the skin). ? Itching. ? Swelling. ? Belly pain, nausea, or vomiting.  
  · Your child has a high fever.  
  · Your child cries for 3 hours or more within 2 to 3 days after getting the shot.  
 Watch closely for changes in your child's health, and be sure to contact your doctor if your child has any problems. Where can you learn more? Go to http://jemma-javi.info/. Enter E819 in the search box to learn more about \"Pneumococcal Conjugate Vaccine for Children: Care Instructions. \" Current as of: June 17, 2018 Content Version: 11.9 © 4931-2424 [employer]. Care instructions adapted under license by [employer] (which disclaims liability or warranty for this information). If you have questions about a medical condition or this instruction, always ask your healthcare professional. [place] [address] any warranty or liability for your use of this information.

## 2019-02-09 LAB
25(OH)D3+25(OH)D2 SERPL-MCNC: 24.7 NG/ML (ref 30–100)
ALBUMIN SERPL-MCNC: 4.5 G/DL (ref 3.6–4.8)
ALBUMIN/GLOB SERPL: 1.7 {RATIO} (ref 1.2–2.2)
ALP SERPL-CCNC: 51 IU/L (ref 39–117)
ALT SERPL-CCNC: 19 IU/L (ref 0–44)
AST SERPL-CCNC: 21 IU/L (ref 0–40)
BASOPHILS # BLD AUTO: 0 X10E3/UL (ref 0–0.2)
BASOPHILS NFR BLD AUTO: 1 %
BILIRUB SERPL-MCNC: 0.7 MG/DL (ref 0–1.2)
BUN SERPL-MCNC: 35 MG/DL (ref 8–27)
BUN/CREAT SERPL: 17 (ref 10–24)
CALCIUM SERPL-MCNC: 9.6 MG/DL (ref 8.6–10.2)
CHLORIDE SERPL-SCNC: 104 MMOL/L (ref 96–106)
CHOLEST SERPL-MCNC: 204 MG/DL (ref 100–199)
CO2 SERPL-SCNC: 21 MMOL/L (ref 20–29)
CREAT SERPL-MCNC: 2.02 MG/DL (ref 0.76–1.27)
EOSINOPHIL # BLD AUTO: 0.3 X10E3/UL (ref 0–0.4)
EOSINOPHIL NFR BLD AUTO: 3 %
ERYTHROCYTE [DISTWIDTH] IN BLOOD BY AUTOMATED COUNT: 15.2 % (ref 12.3–15.4)
EST. AVERAGE GLUCOSE BLD GHB EST-MCNC: 128 MG/DL
GLOBULIN SER CALC-MCNC: 2.7 G/DL (ref 1.5–4.5)
GLUCOSE SERPL-MCNC: 79 MG/DL (ref 65–99)
HBA1C MFR BLD: 6.1 % (ref 4.8–5.6)
HCT VFR BLD AUTO: 42.5 % (ref 37.5–51)
HDLC SERPL-MCNC: 48 MG/DL
HGB BLD-MCNC: 14.4 G/DL (ref 13–17.7)
IMM GRANULOCYTES # BLD AUTO: 0 X10E3/UL (ref 0–0.1)
IMM GRANULOCYTES NFR BLD AUTO: 0 %
INTERPRETATION, 910389: NORMAL
INTERPRETATION: NORMAL
LDLC SERPL CALC-MCNC: 138 MG/DL (ref 0–99)
LYMPHOCYTES # BLD AUTO: 2 X10E3/UL (ref 0.7–3.1)
LYMPHOCYTES NFR BLD AUTO: 25 %
MCH RBC QN AUTO: 28.7 PG (ref 26.6–33)
MCHC RBC AUTO-ENTMCNC: 33.9 G/DL (ref 31.5–35.7)
MCV RBC AUTO: 85 FL (ref 79–97)
MONOCYTES # BLD AUTO: 0.7 X10E3/UL (ref 0.1–0.9)
MONOCYTES NFR BLD AUTO: 9 %
NEUTROPHILS # BLD AUTO: 5 X10E3/UL (ref 1.4–7)
NEUTROPHILS NFR BLD AUTO: 62 %
PDF IMAGE, 910387: NORMAL
PLATELET # BLD AUTO: 271 X10E3/UL (ref 150–379)
POTASSIUM SERPL-SCNC: 5.8 MMOL/L (ref 3.5–5.2)
PROT SERPL-MCNC: 7.2 G/DL (ref 6–8.5)
RBC # BLD AUTO: 5.02 X10E6/UL (ref 4.14–5.8)
SODIUM SERPL-SCNC: 141 MMOL/L (ref 134–144)
TRIGL SERPL-MCNC: 90 MG/DL (ref 0–149)
TSH SERPL DL<=0.005 MIU/L-ACNC: 3.46 UIU/ML (ref 0.45–4.5)
VLDLC SERPL CALC-MCNC: 18 MG/DL (ref 5–40)
WBC # BLD AUTO: 7.9 X10E3/UL (ref 3.4–10.8)

## 2019-05-20 NOTE — PROGRESS NOTES
Chief Complaint   Patient presents with    Abdominal Pain     x 4 days    Diarrhea     x 3 days    Fatigue       HPI:    This is a 59 y/o  patient who presents for the above symptoms  Diarrhea with nausea for 4 days. Moved bowels up to 6 x on thursady and Friday  Starting to improve . Patient unsure if he ate any bad food. No blood in stool. No fever,  urinary or URI symptoms       ROS:  Pertinent positive as above in HPI. All others were negative         Past Medical History:   Diagnosis Date    Hypercholesterolemia     Hypertension        No past surgical history on file. No family history on file. Social History     Social History    Marital status:      Spouse name: N/A    Number of children: N/A    Years of education: N/A     Occupational History    Not on file. Social History Main Topics    Smoking status: Current Every Day Smoker     Packs/day: 1.00     Types: Cigarettes    Smokeless tobacco: Never Used    Alcohol use Yes      Comment: OCCASSIONALLY    Drug use: No    Sexual activity: Yes     Partners: Female     Other Topics Concern    Not on file     Social History Narrative     Current Outpatient Prescriptions   Medication Sig Dispense Refill    fenofibrate nanocrystallized (TRICOR) 145 mg tablet take 1 tablet by mouth once daily 30 Tab 5    amLODIPine (NORVASC) 5 mg tablet take 1 tablet by mouth once daily 30 Tab 5    clopidogrel (PLAVIX) 75 mg tab Take 1 Tab by mouth daily. 30 Tab 5    valsartan (DIOVAN) 320 mg tablet Take 1 Tab by mouth daily. 320 Tab 5    nebivolol (BYSTOLIC) 5 mg tablet Take 1 Tab by mouth daily. 30 Tab 5    VITAMIN D2 50,000 unit capsule take 1 capsule by mouth every week 12 Cap 1    aspirin 81 mg chewable tablet Take 81 mg by mouth daily.  cetirizine (ZYRTEC) 10 mg tablet Take 1 Tab by mouth daily.  30 Tab 2    fluticasone (FLONASE) 50 mcg/actuation nasal spray 1 spray into each nostril daily 1 Bottle 6    Omega-3 Fatty Acids (FISH OIL) 500 mg cap Take  by mouth.  ascorbic acid, vitamin C, (VITAMIN C) 500 mg tablet Take  by mouth. Allergies   Allergen Reactions    Simvastatin Other (comments)         Physical Exam:    Vital Signs:   Visit Vitals    /56    Pulse 63    Temp 96.7 °F (35.9 °C) (Oral)    Resp 18    Ht 6' 2\" (1.88 m)    Wt 209 lb (94.8 kg)    BMI 26.83 kg/m2         General: a, a & o x 3, afebrile, interacting appropriately, in no acute distress  Respiratory: lung sounds clear bilaterally,  no wheezes or crackles  Cardiovascular: normal S1S2, regular rate and rhythm, no murmurs  Abdomen: non-distended, normal bowel sounds x 4 quadrants, soft, non-tender to palpation    Assessment/Plan:      ICD-10-CM ICD-9-CM    1. Gastroenteritis K52.9 558.9 Patient advised to drink plenty of fluid. Avoid offending foods. Brat diet advised               Additional Notes: Discussed today's diagnosis, treatment plans. Discussed medication indications and side effects. After Visit Summary: Provided and discussed printed patient instructions. Answered questions in relation to today's diagnosis.   Follow-up Disposition: as needed          Leopoldo Varma NP-BC  [address] [facility],[address] How Severe Is Your Acne?: mild Is This A New Presentation, Or A Follow-Up?: Follow Up Acne

## 2019-05-24 ENCOUNTER — TELEPHONE (OUTPATIENT)
Dept: FAMILY MEDICINE CLINIC | Age: 64
End: 2019-05-24

## 2019-05-24 ENCOUNTER — HOSPITAL ENCOUNTER (OUTPATIENT)
Dept: LAB | Age: 64
Discharge: HOME | End: 2019-05-24
Payer: MEDICARE

## 2019-05-24 ENCOUNTER — OFFICE VISIT (OUTPATIENT)
Dept: FAMILY MEDICINE CLINIC | Age: 64
End: 2019-05-24

## 2019-05-24 VITALS
OXYGEN SATURATION: 100 % | WEIGHT: 213.2 LBS | HEART RATE: 56 BPM | HEIGHT: 74 IN | SYSTOLIC BLOOD PRESSURE: 125 MMHG | BODY MASS INDEX: 27.36 KG/M2 | DIASTOLIC BLOOD PRESSURE: 71 MMHG | RESPIRATION RATE: 16 BRPM

## 2019-05-24 DIAGNOSIS — E55.9 VITAMIN D DEFICIENCY: ICD-10-CM

## 2019-05-24 DIAGNOSIS — R00.1 BRADYCARDIA: Primary | ICD-10-CM

## 2019-05-24 DIAGNOSIS — Z72.0 TOBACCO ABUSE: ICD-10-CM

## 2019-05-24 DIAGNOSIS — I10 ESSENTIAL HYPERTENSION: ICD-10-CM

## 2019-05-24 DIAGNOSIS — E78.2 MIXED HYPERLIPIDEMIA: ICD-10-CM

## 2019-05-24 DIAGNOSIS — R06.02 SOB (SHORTNESS OF BREATH): ICD-10-CM

## 2019-05-24 DIAGNOSIS — I70.1 RENAL ARTERY STENOSIS (HCC): Primary | ICD-10-CM

## 2019-05-24 LAB
25(OH)D3 SERPL-MCNC: 31.4 NG/ML (ref 30–100)
ALBUMIN SERPL-MCNC: 4 G/DL (ref 3.4–5)
ALBUMIN/GLOB SERPL: 1.3 {RATIO} (ref 0.8–1.7)
ALP SERPL-CCNC: 55 U/L (ref 45–117)
ALT SERPL-CCNC: 21 U/L (ref 16–61)
ANION GAP SERPL CALC-SCNC: 5 MMOL/L (ref 3–18)
AST SERPL-CCNC: 21 U/L (ref 15–37)
BILIRUB SERPL-MCNC: 0.5 MG/DL (ref 0.2–1)
BUN SERPL-MCNC: 32 MG/DL (ref 7–18)
BUN/CREAT SERPL: 17 (ref 12–20)
CALCIUM SERPL-MCNC: 9.2 MG/DL (ref 8.5–10.1)
CHLORIDE SERPL-SCNC: 115 MMOL/L (ref 100–108)
CHOLEST SERPL-MCNC: 196 MG/DL
CO2 SERPL-SCNC: 23 MMOL/L (ref 21–32)
CREAT SERPL-MCNC: 1.87 MG/DL (ref 0.6–1.3)
GLOBULIN SER CALC-MCNC: 3.1 G/DL (ref 2–4)
GLUCOSE SERPL-MCNC: 92 MG/DL (ref 74–99)
HDLC SERPL-MCNC: 42 MG/DL (ref 40–60)
HDLC SERPL: 4.7 {RATIO} (ref 0–5)
LDLC SERPL CALC-MCNC: 134.2 MG/DL (ref 0–100)
LIPID PROFILE,FLP: ABNORMAL
POTASSIUM SERPL-SCNC: 4.7 MMOL/L (ref 3.5–5.5)
PROT SERPL-MCNC: 7.1 G/DL (ref 6.4–8.2)
SODIUM SERPL-SCNC: 143 MMOL/L (ref 136–145)
TRIGL SERPL-MCNC: 99 MG/DL (ref ?–150)
VLDLC SERPL CALC-MCNC: 19.8 MG/DL

## 2019-05-24 PROCEDURE — 80053 COMPREHEN METABOLIC PANEL: CPT

## 2019-05-24 PROCEDURE — 36415 COLL VENOUS BLD VENIPUNCTURE: CPT

## 2019-05-24 PROCEDURE — 82306 VITAMIN D 25 HYDROXY: CPT

## 2019-05-24 PROCEDURE — 80061 LIPID PANEL: CPT

## 2019-05-24 NOTE — TELEPHONE ENCOUNTER
Called mr hanna to let him know the stress test is canceled and instead he will be referred to cardiology    All diagnosis have been discussed with the patient and all of the patient's questions have been answered.

## 2019-05-24 NOTE — PATIENT INSTRUCTIONS
Home Blood Pressure Test: About This Test  What is it? A home blood pressure test allows you to keep track of your blood pressure at home. Blood pressure is a measure of the force of blood against the walls of your arteries. Blood pressure readings include two numbers, such as 130/80 (say \"130 over 80\"). The first number is the systolic pressure. The second number is the diastolic pressure. Why is this test done? You may do this test at home to:  · Find out if you have high blood pressure. · Track your blood pressure if you have high blood pressure. · Track how well medicine is working to reduce high blood pressure. · Check how lifestyle changes, such as weight loss and exercise, are affecting blood pressure. How can you prepare for the test?  · Do not use caffeine, tobacco, or medicines known to raise blood pressure (such as nasal decongestant sprays) for at least 30 minutes before taking your blood pressure. · Do not exercise for at least 30 minutes before taking your blood pressure. What happens before the test?  Take your blood pressure while you feel comfortable and relaxed. Sit quietly with both feet on the floor for at least 5 minutes before the test.  What happens during the test?  · Sit with your arm slightly bent and resting on a table so that your upper arm is at the same level as your heart. · Roll up your sleeve or take off your shirt to expose your upper arm. · Wrap the blood pressure cuff around your upper arm so that the lower edge of the cuff is about 1 inch above the bend of your elbow. Proceed with the following steps depending on if you are using an automatic or manual pressure monitor. Automatic blood pressure monitors  · Press the on/off button on the automatic monitor and wait until the ready-to-measure \"heart\" symbol appears next to zero in the display window. · Press the start button. The cuff will inflate and deflate by itself.   · Your blood pressure numbers will appear on the screen. · Write your numbers in your log book, along with the date and time. Manual blood pressure monitors  · Place the earpieces of a stethoscope in your ears, and place the bell of the stethoscope over the artery, just below the cuff. · Close the valve on the rubber inflating bulb. · Squeeze the bulb rapidly with your opposite hand to inflate the cuff until the dial or column of mercury reads about 30 mm Hg higher than your usual systolic pressure. If you do not know your usual pressure, inflate the cuff to 210 mm Hg or until the pulse at your wrist disappears. · Open the pressure valve just slightly by twisting or pressing the valve on the bulb. · As you watch the pressure slowly fall, note the level on the dial at which you first start to hear a pulsing or tapping sound through the stethoscope. This is your systolic blood pressure. · Continue letting the air out slowly. The sounds will become muffled and will finally disappear. Note the pressure when the sounds completely disappear. This is your diastolic blood pressure. Let out all the remaining air. · Write your numbers in your log book, along with the date and time. What else should you know about the test?  It is more accurate to take the average of several readings made throughout the day than to rely on a single reading. It's normal for blood pressure to go up and down throughout the day. Follow-up care is a key part of your treatment and safety. Be sure to make and go to all appointments, and call your doctor if you are having problems. It's also a good idea to keep a list of the medicines you take. Where can you learn more? Go to http://jemma-javi.info/. Enter C427 in the search box to learn more about \"Home Blood Pressure Test: About This Test.\"  Current as of: July 22, 2018  Content Version: 11.9  © 5014-0269 [employer].  Care instructions adapted under license by [employer] (which disclaims liability or warranty for this information). If you have questions about a medical condition or this instruction, always ask your healthcare professional. [address] any warranty or liability for your use of this information.

## 2019-05-24 NOTE — PROGRESS NOTES
1. Have you been to the ER, urgent care clinic since your last visit? Hospitalized since your last visit? No    2. Have you seen or consulted any other health care providers outside of the [address] [place] [address] since your last visit? Include any pap smears or colon screening. No     Chief Complaint   Patient presents with    Follow Up Chronic Condition     HTN     There were no vitals taken for this visit.     Visit Vitals  /71 (BP 1 Location: Left arm, BP Patient Position: At rest)   Pulse (!) 56   Resp 16   Ht 6' 2\" (1.88 m)   Wt 213 lb 3.2 oz (96.7 kg)   SpO2 100%   BMI 27.37 kg/m²

## 2019-05-24 NOTE — PROGRESS NOTES
[address] [place] [address]               [phone]      Preston Patterson is a 71 y.o. male and presents with Follow Up Chronic Condition (HTN)         Subjective:    Hypertension:   Patient reports taking medications as instructed. yes   Medication side effects noted. no  Headache upon wakening. no   Home BP monitoring in range of 968/48'M systolic. No results found for: Dc Rudi, MCACR    HLD:  Has been compliant with meds yes  Compliant with low-fat diet. no    Denies myalgias or other side effects. yes  Cholesterol, total   Date Value Ref Range Status   05/24/2019 196 <200 MG/DL Final     Triglyceride   Date Value Ref Range Status   05/24/2019 99 <150 MG/DL Final     Comment:     The drugs N-acetylcysteine (NAC) and  Metamiszole have been found to cause falsely  low results in this chemical assay. Please  be sure to submit blood samples obtained  BEFORE administration of either of these  drugs to assure correct results. HDL Cholesterol   Date Value Ref Range Status   05/24/2019 42 40 - 60 MG/DL Final     LDL, calculated   Date Value Ref Range Status   05/24/2019 134.2 (H) 0 - 100 MG/DL Final   ]  Anxiety  Started a couple of weeks ago  Walked up one flight,10 foot, stairs  Got in office and it was hot  [place] like he could not breath and clausterphobic, had to get out of there  Felt like this in the office this morning, splashed cold water on face, felt better  Did not feel palpitations, just feel like he cant breath    Tobacco abuse  Had stopped smoking for 4 months  Started back up in February    ROS:  As stated in HPI, otherwise all others negative. ROS    The problem list was updated as a part of today's visit.   Patient Active Problem List   Diagnosis Code    Essential hypertension I10    Mixed hyperlipidemia E78.2    Heavy smoker F17.200    Chronic bilateral low back pain with bilateral sciatica M54.42, M54.41, G89.29    Atherosclerosis of coronary artery I25.10    Renal artery stenosis (HCC) I70.1       The PSH, FH were reviewed. SH:  Social History     Tobacco Use    Smoking status: Current Every Day Smoker     Packs/day: 1.00     Types: Cigarettes    Smokeless tobacco: Never Used   Substance Use Topics    Alcohol use: No    Drug use: No       Medications/Allergies:  Current Outpatient Medications on File Prior to Visit   Medication Sig Dispense Refill    cholecalciferol (VITAMIN D3) 1,000 unit cap Take 2,000 Units by mouth every other day.  clopidogrel (PLAVIX) 75 mg tab take 1 tablet by mouth once daily 30 Tab 5    BYSTOLIC 5 mg tablet take 1 tablet by mouth once daily 30 Tab 5    amLODIPine (NORVASC) 5 mg tablet take 1 tablet by mouth once daily 30 Tab 5    losartan (COZAAR) 100 mg tablet Take 1 Tab by mouth daily. 30 Tab 1    fenofibrate nanocrystallized (TRICOR) 145 mg tablet take 1 tablet by mouth once daily 30 Tab 5    cetirizine (ZYRTEC) 10 mg tablet Take 1 Tab by mouth daily. 30 Tab 2    fluticasone (FLONASE) 50 mcg/actuation nasal spray 1 spray into each nostril daily 1 Bottle 6    aspirin 81 mg chewable tablet Take 81 mg by mouth daily.  ascorbic acid, vitamin C, (VITAMIN C) 500 mg tablet Take  by mouth.  varicella-zoster recombinant, PF, (SHINGRIX, PF,) 50 mcg/0.5 mL susr injection 0.5mL by IntraMUSCular route once now and then repeat in 2-6 months 0.5 mL 1    valsartan (DIOVAN) 320 mg tablet take 1 tablet by mouth once daily 30 Tab 5    Omega-3 Fatty Acids (FISH OIL) 500 mg cap Take  by mouth. No current facility-administered medications on file prior to visit. Allergies   Allergen Reactions    Simvastatin Other (comments)       Objective:  Visit Vitals  /71 (BP 1 Location: Left arm, BP Patient Position: At rest)   Pulse (!) 56   Resp 16   Ht 6' 2\" (1.88 m)   Wt 213 lb 3.2 oz (96.7 kg)   SpO2 100%   BMI 27.37 kg/m²    Body mass index is 27.37 kg/m².     Physical assessment  Physical Exam Constitutional: He is oriented to person, place, and time and well-developed, well-nourished, and in no distress. Eyes: Pupils are equal, round, and reactive to light. Conjunctivae are normal.   Neck: Normal range of motion. No JVD present. Cardiovascular: Normal rate, regular rhythm and normal heart sounds. Exam reveals no gallop and no friction rub. No murmur heard. Pulmonary/Chest: Effort normal and breath sounds normal.   Neurological: He is alert and oriented to person, place, and time. Skin: Skin is warm and dry. Psychiatric: Memory, affect and judgment normal.   Nursing note and vitals reviewed.         Labwork and Ancillary Studies:    CBC w/Diff  Lab Results   Component Value Date/Time    WBC 7.9 02/08/2019 08:37 AM    HGB 14.4 02/08/2019 08:37 AM    PLATELET 048 23/70/2301 08:37 AM         Basic Metabolic Profile  Lab Results   Component Value Date/Time    Sodium 143 05/24/2019 09:34 AM    Potassium 4.7 05/24/2019 09:34 AM    Chloride 115 (H) 05/24/2019 09:34 AM    CO2 23 05/24/2019 09:34 AM    Anion gap 5 05/24/2019 09:34 AM    Glucose 92 05/24/2019 09:34 AM    BUN 32 (H) 05/24/2019 09:34 AM    Creatinine 1.87 (H) 05/24/2019 09:34 AM    BUN/Creatinine ratio 17 05/24/2019 09:34 AM    GFR est AA 44 (L) 05/24/2019 09:34 AM    GFR est non-AA 36 (L) 05/24/2019 09:34 AM    Calcium 9.2 05/24/2019 09:34 AM        Cholesterol  Lab Results   Component Value Date/Time    Cholesterol, total 196 05/24/2019 09:34 AM    HDL Cholesterol 42 05/24/2019 09:34 AM    LDL, calculated 134.2 (H) 05/24/2019 09:34 AM    Triglyceride 99 05/24/2019 09:34 AM    CHOL/HDL Ratio 4.7 05/24/2019 09:34 AM       Health Maintenance:   Health Maintenance   Topic Date Due    Shingrix Vaccine Age 50> (1 of 2) 01/12/2004    GLAUCOMA SCREENING Q2Y  01/12/2019    MEDICARE YEARLY EXAM  02/08/2019    COLONOSCOPY  02/08/2020 (Originally 5/30/2018)    Influenza Age 5 to Adult  08/01/2019    Pneumococcal 65+ years (2 of 2 - PPSV23) 02/08/2020    DTaP/Tdap/Td series (2 - Td) 02/12/2023    Hepatitis C Screening  Completed       Assessment/Plan:    Diagnoses and all orders for this visit:    1. Renal artery stenosis ([address]    2. Essential hypertension  -     METABOLIC PANEL, COMPREHENSIVE; Future    3. Mixed hyperlipidemia  -     LIPID PANEL; Future    4. Vitamin D deficiency  -     VITAMIN D, 25 HYDROXY; Future    5. Tobacco abuse    6. SOB (shortness of breath)  -     AMB POC EKG ROUTINE W/ 12 LEADS, INTER & REP      Verbalized sob and feeling hot with exertion giving him an anxiety feeling  PMH ADRIEL w/ stent, cardiac cath w/ 50% LAD  12 lead EKG reviewed by me: SB rate 56, no st/t wave abnormalities  Obtain cardiac stress test    Started smoking after stopping for four months, mother passed in February and he is executor of the estate causing him much stress  Encouraged to quit, educated on the effects of nicotine on the CV system    Blood pressure stable, 125/71, continue same medications, labs today    Taking tricor but is out of fish oil, gets these from chiropractor, recheck levels        I have discussed the diagnosis with the patient and the intended plan as seen in the above orders. The patient has received an After-Visit Summary and questions were answered concerning future plans. An After Visit Summary was printed and given to the patient. All diagnosis have been discussed with the patient and all of the patient's questions have been answered. Follow-up and Dispositions    · Return in about 3 months (around 8/24/2019) for hypertension, hyperlipidemia, 15 minutes. Greater than 50% of the time was spent in counseling and coordination of care. Morena Negron, [facility]  [address] [facility]   [address] [facility] [address]  [address]

## 2019-05-28 NOTE — PROGRESS NOTES
Please let him know his labs look good. Kidney function is decrease but better than the last time we checked. Needs to work on decreasing fatty food intake.  LDL is high

## 2019-05-29 ENCOUNTER — TELEPHONE (OUTPATIENT)
Dept: FAMILY MEDICINE CLINIC | Age: 64
End: 2019-05-29

## 2019-05-29 NOTE — TELEPHONE ENCOUNTER
Call patient at [phone], made aware of his labs and Kidney function decrease but better than last check up and to work on his diet by reducing the fatty intake, due ti LDL is high.

## 2019-06-26 DIAGNOSIS — E78.2 MIXED HYPERLIPIDEMIA: ICD-10-CM

## 2019-06-26 DIAGNOSIS — I10 ESSENTIAL HYPERTENSION: ICD-10-CM

## 2019-06-26 RX ORDER — NEBIVOLOL 5 MG/1
TABLET ORAL
Qty: 30 TAB | Refills: 2 | Status: SHIPPED | OUTPATIENT
Start: 2019-06-26 | End: 2019-09-30 | Stop reason: SDUPTHER

## 2019-06-26 RX ORDER — CLOPIDOGREL BISULFATE 75 MG/1
TABLET ORAL
Qty: 30 TAB | Refills: 2 | Status: SHIPPED | OUTPATIENT
Start: 2019-06-26 | End: 2019-09-30 | Stop reason: SDUPTHER

## 2019-06-26 RX ORDER — FENOFIBRATE 145 MG/1
TABLET, COATED ORAL
Qty: 30 TAB | Refills: 2 | Status: SHIPPED | OUTPATIENT
Start: 2019-06-26 | End: 2019-09-30 | Stop reason: SDUPTHER

## 2019-06-26 RX ORDER — AMLODIPINE BESYLATE 5 MG/1
TABLET ORAL
Qty: 30 TAB | Refills: 2 | Status: SHIPPED | OUTPATIENT
Start: 2019-06-26 | End: 2019-09-30 | Stop reason: SDUPTHER

## 2019-06-26 RX ORDER — VALSARTAN 320 MG/1
TABLET ORAL
Qty: 30 TAB | Refills: 2 | Status: SHIPPED | OUTPATIENT
Start: 2019-06-26 | End: 2019-09-30 | Stop reason: SDUPTHER

## 2019-06-26 NOTE — TELEPHONE ENCOUNTER
Last appt was 5-24-19  Next appt is 8-26-19      Last refill of medications was from [facility] in 2018

## 2019-08-26 ENCOUNTER — OFFICE VISIT (OUTPATIENT)
Dept: FAMILY MEDICINE CLINIC | Age: 64
End: 2019-08-26

## 2019-08-26 VITALS
BODY MASS INDEX: 25.8 KG/M2 | TEMPERATURE: 96.5 F | SYSTOLIC BLOOD PRESSURE: 115 MMHG | DIASTOLIC BLOOD PRESSURE: 62 MMHG | HEIGHT: 74 IN | HEART RATE: 61 BPM | RESPIRATION RATE: 18 BRPM | WEIGHT: 201 LBS | OXYGEN SATURATION: 98 %

## 2019-08-26 DIAGNOSIS — Z87.891 PERSONAL HISTORY OF TOBACCO USE, PRESENTING HAZARDS TO HEALTH: ICD-10-CM

## 2019-08-26 DIAGNOSIS — I25.118 ATHEROSCLEROSIS OF CORONARY ARTERY OF NATIVE HEART WITH STABLE ANGINA PECTORIS, UNSPECIFIED VESSEL OR LESION TYPE (HCC): ICD-10-CM

## 2019-08-26 DIAGNOSIS — F17.210 CIGARETTE NICOTINE DEPENDENCE WITHOUT COMPLICATION: ICD-10-CM

## 2019-08-26 DIAGNOSIS — I10 ESSENTIAL HYPERTENSION: ICD-10-CM

## 2019-08-26 DIAGNOSIS — Z00.00 WELCOME TO MEDICARE PREVENTIVE VISIT: Primary | ICD-10-CM

## 2019-08-26 DIAGNOSIS — E78.2 MIXED HYPERLIPIDEMIA: ICD-10-CM

## 2019-08-26 DIAGNOSIS — Z87.891 PERSONAL HISTORY OF NICOTINE DEPENDENCE: ICD-10-CM

## 2019-08-26 PROBLEM — N18.30 CKD (CHRONIC KIDNEY DISEASE) STAGE 3, GFR 30-59 ML/MIN (HCC): Status: ACTIVE | Noted: 2019-08-26

## 2019-08-26 RX ORDER — VARENICLINE TARTRATE 25 MG
0.5 KIT ORAL
Qty: 1 DOSE PACK | Refills: 0 | Status: SHIPPED | OUTPATIENT
Start: 2019-08-26 | End: 2019-11-26

## 2019-08-26 NOTE — PROGRESS NOTES
Tulio Morocho               [phone]    This is a \"Welcome to [facility]"  Initial Preventive Physical Examination (IPPE) providing Personalized Prevention Plan Services (Performed in the first 12 months of enrollment)    I have reviewed the patient's medical history in detail and updated the computerized patient record. History     Past Medical History:   Diagnosis Date    Hypercholesterolemia     Hypertension       No past surgical history on file. Current Outpatient Medications   Medication Sig Dispense Refill    varenicline (CHANTIX STARTER PONCE) 0.5 mg (11)- 1 mg (42) DsPk Take 0.5 mg by mouth daily (after breakfast). 1 Dose Pack 0    fenofibrate nanocrystallized (TRICOR) 145 mg tablet take 1 tablet by mouth once daily 30 Tab 2    nebivolol (BYSTOLIC) 5 mg tablet take 1 tablet by mouth once daily 30 Tab 2    clopidogrel (PLAVIX) 75 mg tab take 1 tablet by mouth once daily 30 Tab 2    amLODIPine (NORVASC) 5 mg tablet take 1 tablet by mouth once daily 30 Tab 2    valsartan (DIOVAN) 320 mg tablet take 1 tablet by mouth once daily 30 Tab 2    cholecalciferol (VITAMIN D3) 1,000 unit cap Take 2,000 Units by mouth every other day.  Omega-3 Fatty Acids (FISH OIL) 500 mg cap Take  by mouth.  ascorbic acid, vitamin C, (VITAMIN C) 500 mg tablet Take  by mouth.        Allergies   Allergen Reactions    Simvastatin Other (comments)     Family History   Problem Relation Age of Onset    Diabetes Mother     Heart Disease Mother     Stroke Father     Diabetes Maternal Grandmother      Social History     Tobacco Use    Smoking status: Current Every Day Smoker     Packs/day: 1.00     Types: Cigarettes    Smokeless tobacco: Never Used   Substance Use Topics    Alcohol use: No     Diet, Lifestyle: No special diet    Exercise level: moderately active    Depression Risk Screen     3 most recent PHQ Screens 8/26/2019   Little interest or pleasure in doing things Not at all   Feeling down, depressed, irritable, or hopeless Not at all   Total Score PHQ 2 0     Alcohol Risk Screen   You do not drink alcohol or very rarely. Functional Ability and Level of Safety   Hearing Loss  Hearing is good. Vision Screening  Vision is good. The patient needs further evaluation. Visual Acuity Screening    Right eye Left eye Both eyes   Without correction:      With correction: 20/25 20/35 20/25         Activities of Daily Living  The home contains: handrails and grab bars  Patient does total self care    Fall Risk Screen  Fall Risk Assessment, last 12 mths 8/26/2019   Able to walk? Yes   Fall in past 12 months? No       Abuse Screen  Patient is not abused    Hypertension:   Patient reports taking medications as instructed. yes   Medication side effects noted. no  Headache upon wakening. no   Home BP monitoring in range of does not check's systolic. HLD:  Has been compliant with meds yes  Compliant with low-fat diet. yes    Denies myalgias or other side effects. yes  Cholesterol, total   Date Value Ref Range Status   05/24/2019 196 <200 MG/DL Final     Triglyceride   Date Value Ref Range Status   05/24/2019 99 <150 MG/DL Final     Comment:     The drugs N-acetylcysteine (NAC) and  Metamiszole have been found to cause falsely  low results in this chemical assay. Please  be sure to submit blood samples obtained  BEFORE administration of either of these  drugs to assure correct results. HDL Cholesterol   Date Value Ref Range Status   05/24/2019 42 40 - 60 MG/DL Final     LDL, calculated   Date Value Ref Range Status   05/24/2019 134.2 (H) 0 - 100 MG/DL Final     Physical Exam   Constitutional: He is oriented to person, place, and time and well-developed, well-nourished, and in no distress. Eyes: Pupils are equal, round, and reactive to light. Conjunctivae are normal.   Neck: Normal range of motion. No JVD present.    Cardiovascular: Normal rate, regular rhythm and normal heart sounds. Exam reveals no gallop and no friction rub. No murmur heard. Pulmonary/Chest: Effort normal and breath sounds normal.   Musculoskeletal: Normal range of motion. Neurological: He is alert and oriented to person, place, and time. Skin: Skin is warm and dry. Psychiatric: Memory, affect and judgment normal.   Nursing note and vitals reviewed. Screening EKG   EKG order placed: No    Patient Care Team   Patient Care Team:  Yg Veliz NP as PCP - General (Nurse Practitioner)  Rashi Mobley MD (Nephrology)     End of Life Planning   Advanced care planning directives were discussed with the patient and /or family/caregiver. Assessment/Plan   Education and counseling provided:  Are appropriate based on today's review and evaluation  End-of-Life planning (with patient's consent)    Diagnoses and all orders for this visit:    1. Welcome to Medicare preventive visit  -     [place] SMOKING AND TOBAC[place] USE CESSATION 3 - 10 MINUTES  -     REFERRAL TO OPHTHALMOLOGY   Completed welcome to Medicare visit today   Has had a twelve-lead EKG done previously, no EKG performed today    2. Essential hypertension  -     METABOLIC PANEL, COMPREHENSIVE; Future   Blood pressure controlled on current medications, will continue the same   Routine labs today    3. Mixed hyperlipidemia  -     LIPID PANEL; Future   Follow-up on lipid levels, asked him to return to have labs drawn while fasting    4.  Atherosclerosis of coronary artery of native heart with stable angina pectoris, unspecified vessel or lesion type [facility])   He is followed up with his cardiologist for complaints of shortness of breath   Echocardiogram done on 8/19/2019 revealed EF 20%, mild diastolic dysfunction, mild aortic and pulmonic regurgitation, mildly dilated aortic root 4.0 cm   He had a myocardial perfusion study performed on 8/23/2019: Impression is the study is abnormal, this is a medium risk study, there is a medium-sized area of moderate intensity infarct/ischemia in the inferior/lateral segment   He has an appointment with his cardiologist scheduled to review these results    5. Cigarette nicotine dependence without complication  -     varenicline (CHANTIX STARTER PONCE) 0.5 mg (11)- 1 mg (42) DsPk; Take 0.5 mg by mouth daily (after breakfast).  He continues to smoke, states the stress in his life is diminishing   He is interested in trying to quit smoking again, he had a good response to Chantix in the past, will restart this now  -Tobacco cessation  Ready to quit: yes  Counseling given: yes  Reviewed:  S/s of withdrawal: Typically peak in one to two weeks but may continue for months, increased appetite or weight gain, depressed mood, apathy, insomnia, irritability, frustration, anger, anxiety, difficulty concentrating, restlessness  Triggers identification: identify situations, internal states, activities  Problems solving to ACE (avoid, change or escape): Say away from high risk environments, alter a high risk situation, excuse oneself from high risk situation. Strategies: exercise, no smoking zones, behavioral distractions, cognitive distraction, oral strategies, positive self-talk and visualization, benefits of quitting    6. Personal history of tobacco use, presenting hazards to health  -     [place] SMOKING AND TOBACCO USE CESSATION 3 - 10 MINUTES  -     [place] LOW DOSE LUNG CANCER SCREENING; Future    7. Personal history of nicotine dependence   -     [place] SMOKING AND TOBACCO USE CESSATION 3 - 10 MINUTES        Health Maintenance Due   Topic Date Due    Shingrix Vaccine Age 49> (1 of 2) 01/12/2004    GLAUCOMA SCREENING Q2Y  01/12/2019     Discussed with patient current guidelines for screening for lung cancer. Current recommendations are to obtain yearly screening LDCT yearly for age 46-80, or until smoke free for 15 years. Patient has 50 pack year history of cigarette smoking and currently is smoking.   Discussed with patient risks and benefits of screening, including over-diagnosis, false positive rate, and total radiation exposure. Patient currently exhibits no signs or symptoms suggestive of lung cancer. Discussed with patient importance of compliance with yearly annual lung cancer screenings and willingness to undergo diagnosis and treatment if screening scan is positive. In addition, patient was counseled regarding (remaining smoke free/total smoking cessation). An After Visit Summary was printed and given to the patient. All diagnosis have been discussed with the patient and all of the patient's questions have been answered. Follow-up and Dispositions    · Return in about 3 months (around 11/26/2019) for hypertension, hyperlipidemia, 30 minutes AND Lab visit. [place] [id number] [address]   [address]

## 2019-08-26 NOTE — ACP (ADVANCE CARE PLANNING)
Advance Care Planning (ACP) Provider Conversation Snapshot    Date of ACP Conversation: 08/26/19  Persons included in Conversation:  patient  Length of ACP Conversation in minutes:  <16 minutes (Non-Billable)    Authorized Decision Maker (if patient is incapable of making informed decisions): This person is: Wendie Wilson, Son and Lorenzo Menon, daughter  Healthcare Agent/Medical Power of  under Advance Directive            For Patients with Decision Making Capacity:   Values/Goals: Exploration of values, goals, and preferences if recovery is not expected, even with continued medical treatment in the event of:  Imminent death  Severe, permanent brain injury  \"In these circumstances, what matters most to you? \"  Care focused more on comfort or quality of life.     Conversation Outcomes / Follow-Up Plan:   Recommended completion of Advance Directive form after review of ACP materials and conversation with prospective healthcare agent

## 2019-08-26 NOTE — PATIENT INSTRUCTIONS
Medicare Wellness Visit, Male    The best way to live healthy is to have a lifestyle where you eat a well-balanced diet, exercise regularly, limit alcohol use, and quit all forms of tobacco/nicotine, if applicable. Regular preventive services are another way to keep healthy. Preventive services (vaccines, screening tests, monitoring & exams) can help personalize your care plan, which helps you manage your own care. Screening tests can find health problems at the earliest stages, when they are easiest to treat. [address] [facility] follows the current, evidence-based guidelines published by the [facility] Benjamin Rebekah ([facility]) when recommending preventive services for our patients. Because we follow these guidelines, sometimes recommendations change over time as research supports it. (For example, a prostate screening blood test is no longer routinely recommended for men with no symptoms.)  Of course, you and your doctor may decide to screen more often for some diseases, based on your risk and co-morbidities (chronic disease you are already diagnosed with). Preventive services for you include:  - Medicare offers their members a free annual wellness visit, which is time for you and your primary care provider to discuss and plan for your preventive service needs. Take advantage of this benefit every year!  -All adults over age 72 should receive the recommended pneumonia vaccines. Current USPSTF guidelines recommend a series of two vaccines for the best pneumonia protection.   -All adults should have a flu vaccine yearly and an ECG.  All adults age 61 and older should receive a shingles vaccine once in their lifetime.    -All adults age 38-68 who are overweight should have a diabetes screening test once every three years.   -Other screening tests & preventive services for persons with diabetes include: an eye exam to screen for diabetic retinopathy, a kidney function test, a foot exam, and stricter control over your cholesterol.   -Cardiovascular screening for adults with routine risk involves an electrocardiogram (ECG) at intervals determined by the provider.   -Colorectal cancer screening should be done for adults age 54-65 with no increased risk factors for colorectal cancer. There are a number of acceptable methods of screening for this type of cancer. Each test has its own benefits and drawbacks. Discuss with your provider what is most appropriate for you during your annual wellness visit. The different tests include: colonoscopy (considered the best screening method), a fecal occult blood test, a fecal DNA test, and sigmoidoscopy.  -All adults born between [facility] should be screened once for Hepatitis C.  -An Abdominal Aortic Aneurysm (AAA) Screening is recommended for men age 73-68 who has ever smoked in their lifetime. Here is a list of your current Health Maintenance items (your personalized list of preventive services) with a due date:  Health Maintenance Due   Topic Date Due    Shingles Vaccine (1 of 2) 01/12/2004    Glaucoma Screening   01/12/2019    Annual Well Visit  02/08/2019    Flu Vaccine  08/01/2019     Medicare Wellness Visit, Male    The best way to live healthy is to have a lifestyle where you eat a well-balanced diet, exercise regularly, limit alcohol use, and quit all forms of tobacco/nicotine, if applicable. Regular preventive services are another way to keep healthy. Preventive services (vaccines, screening tests, monitoring & exams) can help personalize your care plan, which helps you manage your own care. Screening tests can find health problems at the earliest stages, when they are easiest to treat. [address] [facility] follows the current, evidence-based guidelines published by the [facility] Benjamin Welch ([facility]STF) when recommending preventive services for our patients.  Because we follow these guidelines, sometimes recommendations change over time as research supports it. (For example, a prostate screening blood test is no longer routinely recommended for men with no symptoms.)  Of course, you and your doctor may decide to screen more often for some diseases, based on your risk and co-morbidities (chronic disease you are already diagnosed with). Preventive services for you include:  - Medicare offers their members a free annual wellness visit, which is time for you and your primary care provider to discuss and plan for your preventive service needs. Take advantage of this benefit every year!  -All adults over age 72 should receive the recommended pneumonia vaccines. Current USPSTF guidelines recommend a series of two vaccines for the best pneumonia protection.   -All adults should have a flu vaccine yearly and an ECG. All adults age 61 and older should receive a shingles vaccine once in their lifetime.    -All adults age 38-68 who are overweight should have a diabetes screening test once every three years.   -Other screening tests & preventive services for persons with diabetes include: an eye exam to screen for diabetic retinopathy, a kidney function test, a foot exam, and stricter control over your cholesterol.   -Cardiovascular screening for adults with routine risk involves an electrocardiogram (ECG) at intervals determined by the provider.   -Colorectal cancer screening should be done for adults age 54-65 with no increased risk factors for colorectal cancer. There are a number of acceptable methods of screening for this type of cancer. Each test has its own benefits and drawbacks. Discuss with your provider what is most appropriate for you during your annual wellness visit.  The different tests include: colonoscopy (considered the best screening method), a fecal occult blood test, a fecal DNA test, and sigmoidoscopy.  -All adults born between [facility] should be screened once for Hepatitis C.  -An Abdominal Aortic Aneurysm (AAA) Screening is recommended for men age 73-68 who has ever smoked in their lifetime.      Here is a list of your current Health Maintenance items (your personalized list of preventive services) with a due date:  Health Maintenance Due   Topic Date Due    Shingles Vaccine (1 of 2) 01/12/2004    Glaucoma Screening   01/12/2019    Annual Well Visit  02/08/2019    Flu Vaccine  08/01/2019

## 2019-08-26 NOTE — PROGRESS NOTES
1. Have you been to the ER, urgent care clinic or hospitalized since your last visit? NO.     2. Have you seen or consulted any other health care providers outside of the [address] [place] [address] since your last visit (Include any pap smears or colon screening)? YES  Had Echo and Stress Test completed, 8/19 and 8/23    Do you have an Advanced Directive? NO    Would you like information on Advanced Directives?  NO

## 2019-08-27 ENCOUNTER — TELEPHONE (OUTPATIENT)
Dept: FAMILY MEDICINE CLINIC | Age: 64
End: 2019-08-27

## 2019-08-27 NOTE — TELEPHONE ENCOUNTER
Mikey from pharmacy called to get clarification on the directions for Chantix, he states that the beginners pack has directions on how to take it for 28 days and they do not match with the directions on the prescription.   Santana Lopez can be reached at [phone]

## 2019-08-30 NOTE — TELEPHONE ENCOUNTER
Kishor Goss, NP  You 3 days ago      Please tell them to follow the instructions according to the starters pack    Routing comment      Spoke to pt and made aware of the message, verbalized understanding.  Thank you

## 2019-09-10 ENCOUNTER — NURSE NAVIGATOR (OUTPATIENT)
Dept: OTHER | Age: 64
End: 2019-09-10

## 2019-09-10 NOTE — NURSE NAVIGATOR
Referring Provider: Perez Anderson NP      Lung Cancer Risk Profile:   Age: 71  Gender: Male  Height: 74\"  Weight: 215#    Smoking History:  Smoking Status: current use  # years smokin  # years quit: 0  Packs/day: 1  Pack years: 52    Patient discussed smoking cessation with PCP: Yes, per patient report    Patient participated in shared decision making process with PCP: Yes, per provider note    Patient is currently experiencing symptoms: No, per patient report    If yes what symptoms:     Co-Morbidities:  CAD    Cancer History:      Additional Risk Factors: Exposure to second hand smoke      Patient's smoking history discussed via phone. Patient meets LDCT lung cancer screening criteria.  Call transferred to central scheduling to schedule exam.      RANDY LemusN, RN, [address] [address] [facility] Nurse Navigator

## 2019-09-11 ENCOUNTER — HOSPITAL ENCOUNTER (OUTPATIENT)
Dept: LAB | Age: 64
Discharge: HOME | End: 2019-09-11
Payer: MEDICARE

## 2019-09-11 DIAGNOSIS — I10 ESSENTIAL HYPERTENSION: ICD-10-CM

## 2019-09-11 DIAGNOSIS — E78.2 MIXED HYPERLIPIDEMIA: ICD-10-CM

## 2019-09-11 LAB
ALBUMIN SERPL-MCNC: 4.1 G/DL (ref 3.4–5)
ALBUMIN/GLOB SERPL: 1.2 {RATIO} (ref 0.8–1.7)
ALP SERPL-CCNC: 47 U/L (ref 45–117)
ALT SERPL-CCNC: 26 U/L (ref 16–61)
ANION GAP SERPL CALC-SCNC: 9 MMOL/L (ref 3–18)
AST SERPL-CCNC: 20 U/L (ref 10–38)
BILIRUB SERPL-MCNC: 0.4 MG/DL (ref 0.2–1)
BUN SERPL-MCNC: 36 MG/DL (ref 7–18)
BUN/CREAT SERPL: 17 (ref 12–20)
CALCIUM SERPL-MCNC: 10 MG/DL (ref 8.5–10.1)
CHLORIDE SERPL-SCNC: 111 MMOL/L (ref 100–111)
CHOLEST SERPL-MCNC: 273 MG/DL
CO2 SERPL-SCNC: 23 MMOL/L (ref 21–32)
CREAT SERPL-MCNC: 2.1 MG/DL (ref 0.6–1.3)
GLOBULIN SER CALC-MCNC: 3.3 G/DL (ref 2–4)
GLUCOSE SERPL-MCNC: 97 MG/DL (ref 74–99)
HDLC SERPL-MCNC: 35 MG/DL (ref 40–60)
HDLC SERPL: 7.8 {RATIO} (ref 0–5)
LDLC SERPL CALC-MCNC: 198.6 MG/DL (ref 0–100)
LIPID PROFILE,FLP: ABNORMAL
POTASSIUM SERPL-SCNC: 4.9 MMOL/L (ref 3.5–5.5)
PROT SERPL-MCNC: 7.4 G/DL (ref 6.4–8.2)
SODIUM SERPL-SCNC: 143 MMOL/L (ref 136–145)
TRIGL SERPL-MCNC: 197 MG/DL (ref ?–150)
VLDLC SERPL CALC-MCNC: 39.4 MG/DL

## 2019-09-11 PROCEDURE — 80053 COMPREHEN METABOLIC PANEL: CPT

## 2019-09-11 PROCEDURE — 36415 COLL VENOUS BLD VENIPUNCTURE: CPT

## 2019-09-11 PROCEDURE — 80061 LIPID PANEL: CPT

## 2019-09-17 ENCOUNTER — TELEPHONE (OUTPATIENT)
Dept: FAMILY MEDICINE CLINIC | Age: 64
End: 2019-09-17

## 2019-09-17 NOTE — TELEPHONE ENCOUNTER
I called Mr. hanna to review his recent lab results, he informed me he had a stress test done in August which she was told was positive and he needed to go in for cardiac catheterization on October 1 at [facility].  Reviewed his chart and the results of his August 2019 stress test showed medium sized area of moderate intensity infarct/ischemia in the inferior/lateral segment with a normal LV function. We discussed his labs with the increase in his cholesterol and LDL and he states that he has been taking his medications daily as prescribed. He had labs drawn as ordered by Dr. Domi Carpenter and they did show his renal function improving from the labs that I had drawn form. Did spend some time giving him education on how to use nitroglycerin sublingually: 1 tablet under his tongue for any signs and symptoms of shortness of breath, chest tightness/pressure he can repeat this again in 5 minutes for 2 more times. Informed him that if he is not getting any relief after the second nitroglycerin to call 911. Mr. hanna verbalized understanding of the above all of his questions were answered.

## 2019-09-30 DIAGNOSIS — E78.2 MIXED HYPERLIPIDEMIA: ICD-10-CM

## 2019-09-30 DIAGNOSIS — I10 ESSENTIAL HYPERTENSION: ICD-10-CM

## 2019-10-01 RX ORDER — CLOPIDOGREL BISULFATE 75 MG/1
TABLET ORAL
Qty: 90 TAB | Refills: 0 | Status: SHIPPED | OUTPATIENT
Start: 2019-10-01 | End: 2020-02-03 | Stop reason: SDUPTHER

## 2019-10-01 RX ORDER — FENOFIBRATE 145 MG/1
TABLET, COATED ORAL
Qty: 90 TAB | Refills: 0 | Status: SHIPPED | OUTPATIENT
Start: 2019-10-01 | End: 2019-12-27 | Stop reason: SDUPTHER

## 2019-10-01 RX ORDER — NEBIVOLOL 5 MG/1
TABLET ORAL
Qty: 90 TAB | Refills: 0 | Status: SHIPPED | OUTPATIENT
Start: 2019-10-01 | End: 2019-12-27 | Stop reason: SDUPTHER

## 2019-10-01 RX ORDER — VALSARTAN 320 MG/1
TABLET ORAL
Qty: 90 TAB | Refills: 0 | Status: SHIPPED | OUTPATIENT
Start: 2019-10-01 | End: 2019-11-26

## 2019-10-01 RX ORDER — AMLODIPINE BESYLATE 5 MG/1
TABLET ORAL
Qty: 90 TAB | Refills: 0 | Status: SHIPPED | OUTPATIENT
Start: 2019-10-01 | End: 2019-12-27 | Stop reason: SDUPTHER

## 2019-11-26 ENCOUNTER — HOSPITAL ENCOUNTER (OUTPATIENT)
Dept: LAB | Age: 64
Discharge: HOME | End: 2019-11-26
Payer: MEDICARE

## 2019-11-26 ENCOUNTER — OFFICE VISIT (OUTPATIENT)
Dept: FAMILY MEDICINE CLINIC | Age: 64
End: 2019-11-26

## 2019-11-26 VITALS
HEART RATE: 67 BPM | SYSTOLIC BLOOD PRESSURE: 123 MMHG | DIASTOLIC BLOOD PRESSURE: 78 MMHG | TEMPERATURE: 96.5 F | RESPIRATION RATE: 14 BRPM | HEIGHT: 74 IN | OXYGEN SATURATION: 97 % | WEIGHT: 235 LBS | BODY MASS INDEX: 30.16 KG/M2

## 2019-11-26 DIAGNOSIS — E78.2 MIXED HYPERLIPIDEMIA: ICD-10-CM

## 2019-11-26 DIAGNOSIS — I10 ESSENTIAL HYPERTENSION: ICD-10-CM

## 2019-11-26 DIAGNOSIS — I25.118 CORONARY ARTERY DISEASE OF NATIVE ARTERY OF NATIVE HEART WITH STABLE ANGINA PECTORIS (HCC): Primary | ICD-10-CM

## 2019-11-26 DIAGNOSIS — F17.200 HEAVY SMOKER: ICD-10-CM

## 2019-11-26 DIAGNOSIS — Z23 ENCOUNTER FOR IMMUNIZATION: ICD-10-CM

## 2019-11-26 LAB
ALBUMIN SERPL-MCNC: 4 G/DL (ref 3.4–5)
ALBUMIN/GLOB SERPL: 1.1 {RATIO} (ref 0.8–1.7)
ALP SERPL-CCNC: 44 U/L (ref 45–117)
ALT SERPL-CCNC: 38 U/L (ref 16–61)
ANION GAP SERPL CALC-SCNC: 7 MMOL/L (ref 3–18)
AST SERPL-CCNC: 28 U/L (ref 10–38)
BILIRUB SERPL-MCNC: 0.6 MG/DL (ref 0.2–1)
BUN SERPL-MCNC: 36 MG/DL (ref 7–18)
BUN/CREAT SERPL: 18 (ref 12–20)
CALCIUM SERPL-MCNC: 9.5 MG/DL (ref 8.5–10.1)
CHLORIDE SERPL-SCNC: 109 MMOL/L (ref 100–111)
CHOLEST SERPL-MCNC: 279 MG/DL
CO2 SERPL-SCNC: 24 MMOL/L (ref 21–32)
CREAT SERPL-MCNC: 2.02 MG/DL (ref 0.6–1.3)
GLOBULIN SER CALC-MCNC: 3.5 G/DL (ref 2–4)
GLUCOSE SERPL-MCNC: 76 MG/DL (ref 74–99)
HDLC SERPL-MCNC: 38 MG/DL (ref 40–60)
HDLC SERPL: 7.3 {RATIO} (ref 0–5)
LDLC SERPL CALC-MCNC: 193.6 MG/DL (ref 0–100)
LIPID PROFILE,FLP: ABNORMAL
POTASSIUM SERPL-SCNC: 4.5 MMOL/L (ref 3.5–5.5)
PROT SERPL-MCNC: 7.5 G/DL (ref 6.4–8.2)
SODIUM SERPL-SCNC: 140 MMOL/L (ref 136–145)
TRIGL SERPL-MCNC: 237 MG/DL (ref ?–150)
VLDLC SERPL CALC-MCNC: 47.4 MG/DL

## 2019-11-26 PROCEDURE — 80061 LIPID PANEL: CPT

## 2019-11-26 PROCEDURE — 36415 COLL VENOUS BLD VENIPUNCTURE: CPT

## 2019-11-26 PROCEDURE — 80053 COMPREHEN METABOLIC PANEL: CPT

## 2019-11-26 RX ORDER — ISOSORBIDE MONONITRATE 30 MG/1
30 TABLET, EXTENDED RELEASE ORAL DAILY
Qty: 30 TAB | Refills: 1 | Status: SHIPPED | OUTPATIENT
Start: 2019-11-26 | End: 2019-12-27 | Stop reason: SDUPTHER

## 2019-11-26 RX ORDER — GUAIFENESIN 100 MG/5ML
81 LIQUID (ML) ORAL
COMMUNITY
Start: 2019-10-02

## 2019-11-26 RX ORDER — NITROGLYCERIN 0.4 MG/1
0.4 TABLET SUBLINGUAL
COMMUNITY
Start: 2019-09-16

## 2019-11-26 RX ORDER — VARENICLINE TARTRATE 1 MG/1
1 TABLET, FILM COATED ORAL 2 TIMES DAILY
Qty: 60 TAB | Refills: 3 | Status: SHIPPED | OUTPATIENT
Start: 2019-11-26 | End: 2020-03-31

## 2019-11-26 RX ORDER — VARENICLINE TARTRATE 1 MG/1
1 TABLET, FILM COATED ORAL
COMMUNITY
End: 2019-11-26 | Stop reason: DRUGHIGH

## 2019-11-26 RX ORDER — ISOSORBIDE MONONITRATE 30 MG/1
TABLET, EXTENDED RELEASE ORAL DAILY
COMMUNITY
End: 2019-11-26 | Stop reason: SDUPTHER

## 2019-11-26 NOTE — PROGRESS NOTES
[address] [place] [address]               [phone]      Kathyanne Meckel is a 71 y.o. male and presents with Hypertension and Cholesterol Problem       Assessment/Plan:    Diagnoses and all orders for this visit:    1. Coronary artery disease of native artery of native heart with stable angina pectoris ([address]  Since his last visit he has had a cardiac catheterization with the placement of a drug-eluting stent in his circumflex artery  Subsequently he also had an emergency room visit where he thought he was having a heart attack, it was determined at that time it was most likely a medication reaction and his medications were adjusted  He has follow-up visits planned with cardiologist and nephrology  He is due to start cardiac rehab at the beginning of December      2. Essential hypertension  -     METABOLIC PANEL, COMPREHENSIVE; Future  -     isosorbide mononitrate ER (IMDUR) 30 mg tablet; Take 1 Tab by mouth daily. Blood pressure is controlled at 123/78, no changes to his medications are needed  Routine labs ordered    3. Mixed hyperlipidemia  -     LIPID PANEL; Future  Follow-up on his lipid levels today, as we are mostly interested in the triglyceride level he will return for fasting labs    4. Encounter for immunization  -     ADMIN INFLUENZA VIRUS VAC  -     INFLUENZA VACCINE INACTIVATED (IIV), SUBUNIT, ADJUVANTED, IM  Health maintenance need of influenza vaccination administered today    5. Heavy smoker  -     varenicline (CHANTIX) 1 mg tablet; Take 1 Tab by mouth two (2) times a day. He continues to use the Chantix and he has successfully quit smoking at this time    This note was dictated using Dragon dictation system. Every effort was made to ensure accuracy, although occasional spelling errors and word substitutions are expected due to dictation system limitations. Thank you for your understanding and patience.       Follow-up and Dispositions    · Return in about 3 months (around 2/26/2020) for hypertension, hyperlipidemia, 30 minutes. Subjective:    Hypertension:   Patient reports taking medications as instructed. yes   Medication side effects noted. no  Headache upon wakening. no   Home BP monitoring in range of 134/84    HLD:  Has been compliant with meds yes  Compliant with low-fat diet. no    Denies myalgias or other side effects. yes  Cholesterol, total   Date Value Ref Range Status   09/11/2019 273 (H) <200 MG/DL Final     Triglyceride   Date Value Ref Range Status   09/11/2019 197 (H) <150 MG/DL Final     Comment:     The drugs N-acetylcysteine (NAC) and  Metamiszole have been found to cause falsely  low results in this chemical assay. Please  be sure to submit blood samples obtained  BEFORE administration of either of these  drugs to assure correct results. HDL Cholesterol   Date Value Ref Range Status   09/11/2019 35 (L) 40 - 60 MG/DL Final     LDL, calculated   Date Value Ref Range Status   09/11/2019 198.6 (H) 0 - 100 MG/DL Final   ]    10/1/19  Hospital Course:   Shannan Sandra is a 71 y.o. male admitted for evaluation and management of CAD with PCI. Patient was seen in the office with complaints of exertional SOB and intermittent chest pain. Patient was arranged for NST that showed medium sized of infarct/ischemia in the inferior/lateral segment. He underwent cardiac cath that showed lesion of Mid Cx that was stented with [place] KAM. Patient was kept overnight for observation and discharged home on ASA and plavix in addition to beta blocker , norvasc and ARB. Intolerant to statin therapy and will continue tricor   Creat on DC 2.0 (baseline 1.9) and will arrange for BMP check in 1 week given CKD  Patient has follow up arranged in 4 weeks with [facility], PA. Is due to start cardiac rehab 12/2/19    10/16/19  HPI:  Shannan Sandra is a 71 y.o. male we have been asked to see in consult for chest pain.  He has a history of CAD s/p recent PCI 10/1/19, ADRIEL, HTN, HLD, tobacco use. [facility] with complaints of dizziness and chest pain that started when waking up this morning. He has been followed since his PCI for DIA and yesterday he stopped taking Diovan and started on Hydralazine 50mg BID. He took 2 doses yesterday, one around 930 last night. This morning when he woke up he felt dizzy and developed a chest pressure. Associated with SOB, nausea. No vomiting, syncope, palpitations, LE edema. He called 911 and took 1 SL NTG with relief. He also noticed his BP was much lower this morning than it had been (SBPs ~100). His pain resolved prior to arrival in the ED. Currently he has no complains. He has not missed any doses of his DAPT. Assessment & Plan:    1. Chest pain, recent PCI to CFx 10/1/19 - 1 episode, resolved with NTG. ?due to spasm vs low BPs - diovan stopped and started on hydralazine yesterday with BPs low normal this morning. Continue to trend cardiac enzymes. If remain negative, recommend switching hydralazine to low dose Imdur 30mg daily  1. Continue ASA, plavix, bystolic   2. Avoid repeat LHC due to DIA  2. DIA s/p LHC - diovan stopped, Cr coming down, seen by nephrology yesterday with no specific recommendations  3. Hyperlipidemia - , will readdress as outpatient      ROS:As stated in HPI, otherwise all others negative. ROS    The problem list was updated as a part of today's visit. Patient Active Problem List   Diagnosis Code    Essential hypertension I10    Mixed hyperlipidemia E78.2    Heavy smoker F17.200    Chronic bilateral low back pain with bilateral sciatica M54.42, M54.41, G89.29    Atherosclerosis of coronary artery I25.10    Renal artery stenosis (HCC) I70.1    CKD (chronic kidney disease) stage 3, GFR 30-59 ml/min (HCC) N18.3    Coronary artery disease with stable angina pectoris (HCC) I25.118       The PSH, FH were reviewed.       SH:  Social History     Tobacco Use    Smoking status: Current Every Day Smoker     Packs/day: 1.00     Types: Cigarettes    Smokeless tobacco: Never Used   Substance Use Topics    Alcohol use: No    Drug use: No       Medications/Allergies:  Current Outpatient Medications on File Prior to Visit   Medication Sig Dispense Refill    aspirin 81 mg chewable tablet Take 81 mg by mouth.  nitroglycerin (NITROSTAT) 0.4 mg SL tablet 0.4 mg by SubLINGual route.  nebivolol (BYSTOLIC) 5 mg tablet take 1 tablet by mouth once daily 90 Tab 0    clopidogrel (PLAVIX) 75 mg tab take 1 tablet by mouth once daily 90 Tab 0    amLODIPine (NORVASC) 5 mg tablet take 1 tablet by mouth once daily 90 Tab 0    cholecalciferol (VITAMIN D3) 1,000 unit cap Take 2,000 Units by mouth every other day.  Omega-3 Fatty Acids (FISH OIL) 500 mg cap Take  by mouth.  ascorbic acid, vitamin C, (VITAMIN C) 500 mg tablet Take  by mouth.  [DISCONTINUED] isosorbide mononitrate ER (IMDUR) 30 mg tablet Take  by mouth daily.  [DISCONTINUED] varenicline (CHANTIX) 1 mg tablet Take 1 mg by mouth.  fenofibrate nanocrystallized (TRICOR) 145 mg tablet take 1 tablet by mouth once daily 90 Tab 0    [DISCONTINUED] valsartan (DIOVAN) 320 mg tablet take 1 tablet by mouth once daily 90 Tab 0    [DISCONTINUED] varenicline (CHANTIX STARTER PONCE) 0.5 mg (11)- 1 mg (42) DsPk Take 0.5 mg by mouth daily (after breakfast). 1 Dose Pack 0     No current facility-administered medications on file prior to visit. Allergies   Allergen Reactions    Simvastatin Other (comments)       Objective:  Visit Vitals  /78   Pulse 67   Temp 96.5 °F (35.8 °C) (Oral)   Resp 14   Ht 6' 2\" (1.88 m)   Wt 235 lb (106.6 kg)   SpO2 97%   BMI 30.17 kg/m²    Body mass index is 30.17 kg/m². Physical assessment  Physical Exam  Vitals signs and nursing note reviewed. Eyes:      Conjunctiva/sclera: Conjunctivae normal.      Pupils: Pupils are equal, round, and reactive to light.    Neck: Musculoskeletal: Normal range of motion. Vascular: No JVD. Cardiovascular:      Rate and Rhythm: Normal rate and regular rhythm. Heart sounds: Normal heart sounds. No murmur. No friction rub. No gallop. Pulmonary:      Effort: Pulmonary effort is normal.      Breath sounds: Normal breath sounds. Musculoskeletal: Normal range of motion. Skin:     General: Skin is warm and dry. Neurological:      Mental Status: He is alert and oriented to person, place, and time.    Psychiatric:         Mood and Affect: Affect normal.         Cognition and Memory: Memory normal.         Judgment: Judgment normal.           Labwork and Ancillary Studies:    CBC w/Diff  Lab Results   Component Value Date/Time    WBC 7.9 02/08/2019 08:37 AM    HGB 14.4 02/08/2019 08:37 AM    PLATELET 561 95/48/7196 08:37 AM         Basic Metabolic Profile  Lab Results   Component Value Date/Time    Sodium 143 09/11/2019 09:49 AM    Potassium 4.9 09/11/2019 09:49 AM    Chloride 111 09/11/2019 09:49 AM    CO2 23 09/11/2019 09:49 AM    Anion gap 9 09/11/2019 09:49 AM    Glucose 97 09/11/2019 09:49 AM    BUN 36 (H) 09/11/2019 09:49 AM    Creatinine 2.10 (H) 09/11/2019 09:49 AM    BUN/Creatinine ratio 17 09/11/2019 09:49 AM    GFR est AA 39 (L) 09/11/2019 09:49 AM    GFR est non-AA 32 (L) 09/11/2019 09:49 AM    Calcium 10.0 09/11/2019 09:49 AM        Cholesterol  Lab Results   Component Value Date/Time    Cholesterol, total 273 (H) 09/11/2019 09:49 AM    HDL Cholesterol 35 (L) 09/11/2019 09:49 AM    LDL, calculated 198.6 (H) 09/11/2019 09:49 AM    Triglyceride 197 (H) 09/11/2019 09:49 AM    CHOL/HDL Ratio 7.8 (H) 09/11/2019 09:49 AM       Health Maintenance:   Health Maintenance   Topic Date Due    Shingrix Vaccine Age 50> (1 of 2) 01/12/2004    GLAUCOMA SCREENING Q2Y  01/12/2019    Influenza Age 9 to Adult  08/01/2019    COLONOSCOPY  02/08/2020 (Originally 5/30/2018)    Pneumococcal 65+ years (2 of 2 - PPSV23) 02/08/2020    MEDICARE YEARLY EXAM  08/26/2020    DTaP/Tdap/Td series (2 - Td) 02/12/2023    Hepatitis C Screening  Completed       I have discussed the diagnosis with the patient and the intended plan as seen in the above orders. The patient has received an After-Visit Summary and questions were answered concerning future plans. An After Visit Summary was printed and given to the patient. All diagnosis have been discussed with the patient and all of the patient's questions have been answered. Follow-up and Dispositions    · Return in about 3 months (around 2/26/2020) for hypertension, hyperlipidemia, 30 minutes. Faustina Strickland, [facility]  [address] [facility] [address]  [id number]

## 2019-11-26 NOTE — PATIENT INSTRUCTIONS
Heart-Healthy Diet: Care Instructions Your Care Instructions A heart-healthy diet has lots of vegetables, fruits, nuts, beans, and whole grains, and is low in salt. It limits foods that are high in saturated fat, such as meats, cheeses, and fried foods. It may be hard to change your diet, but even small changes can lower your risk of heart attack and heart disease. Follow-up care is a key part of your treatment and safety. Be sure to make and go to all appointments, and call your doctor if you are having problems. It's also a good idea to know your test results and keep a list of the medicines you take. How can you care for yourself at home? Watch your portions · Learn what a serving is. A \"serving\" and a \"portion\" are not always the same thing. Make sure that you are not eating larger portions than are recommended. For example, a serving of pasta is ½ cup. A serving size of meat is 2 to 3 ounces. A 3-ounce serving is about the size of a deck of cards. Measure serving sizes until you are good at [place]" them. Keep in mind that restaurants often serve portions that are 2 or 3 times the size of one serving. · To keep your energy level up and keep you from feeling hungry, eat often but in smaller portions. · Eat only the number of calories you need to stay at a healthy weight. If you need to lose weight, eat fewer calories than your body burns (through exercise and other physical activity). Eat more fruits and vegetables · Eat a variety of fruit and vegetables every day. Dark green, deep orange, red, or yellow fruits and vegetables are especially good for you. Examples include spinach, carrots, peaches, and berries. · Keep carrots, celery, and other veggies handy for snacks. Buy fruit that is in season and store it where you can see it so that you will be tempted to eat it. · Cook dishes that have a lot of veggies in them, such as stir-fries and soups. Limit saturated and trans fat · Read food labels, and try to avoid saturated and trans fats. They increase your risk of heart disease. Trans fat is found in many processed foods such as cookies and crackers. · Use olive or canola oil when you cook. Try cholesterol-lowering spreads, such as Benecol or Take Control. · Bake, broil, grill, or steam foods instead of frying them. · Choose lean meats instead of high-fat meats such as hot dogs and sausages. Cut off all visible fat when you prepare meat. · Eat fish, skinless poultry, and meat alternatives such as soy products instead of high-fat meats. Soy products, such as tofu, may be especially good for your heart. · Choose low-fat or fat-free milk and dairy products. Eat fish · Eat at least two servings of fish a week. Certain fish, such as salmon and tuna, contain omega-3 fatty acids, which may help reduce your risk of heart attack. Eat foods high in fiber · Eat a variety of grain products every day. Include whole-grain foods that have lots of fiber and nutrients. Examples of whole-grain foods include oats, whole wheat bread, and brown rice. · Buy whole-grain breads and cereals, instead of white bread or pastries. Limit salt and sodium · Limit how much salt and sodium you eat to help lower your blood pressure. · Taste food before you salt it. Add only a little salt when you think you need it. With time, your taste buds will adjust to less salt. · Eat fewer snack items, fast foods, and other high-salt, processed foods. Check food labels for the amount of sodium in packaged foods. · Choose low-sodium versions of canned goods (such as soups, vegetables, and beans). Limit sugar · Limit drinks and foods with added sugar. These include candy, desserts, and soda pop. Limit alcohol · Limit alcohol to no more than 2 drinks a day for men and 1 drink a day for women. Too much alcohol can cause health problems. When should you call for help? Watch closely for changes in your health, and be sure to contact your doctor if: 
  · You would like help planning heart-healthy meals. Where can you learn more? Go to http://jemma-javi.info/. Enter V137 in the search box to learn more about \"Heart-Healthy Diet: Care Instructions. \" Current as of: April 9, 2019 Content Version: 12.2 © 2077-6615 [employer]. Care instructions adapted under license by [employer] (which disclaims liability or warranty for this information). If you have questions about a medical condition or this instruction, always ask your healthcare professional. [place] [address] any warranty or liability for your use of this information. Learning About Low-Fat Eating What is low-fat eating? Most food has some fat in it. Your body needs some fat to be healthy. But some kinds of fats are healthier than others. In a low-fat eating plan, you try to choose healthier fats and eat fewer unhealthy fats. Healthy fats include olive and canola oil. Try to avoid eating too much saturated fat (such as in cheese and meats) and trans fat (a type of fat found in many packaged snack foods and other baked goods). You do not need to cut all fat from your diet. But you can make healthier choices about the types and amount of fat you eat. Even though it is a good idea to choose healthier fats, it is still important to be careful of how much fat you eat, because all fats are high in calories. What are the different types of fats? Unhealthy fats · Saturated fat. Saturated fats are mostly in animal foods, such as meat and dairy foods. Tropical oils, such as coconut oil, palm oil, and cocoa butter, are also saturated fats. · Trans fat. Trans fats include shortening, partially hydrogenated vegetable oils, and hydrogenated vegetable oils.  Trans fats are made when a liquid fat is turned into a solid fat (for example, when corn oil is made into stick margarine). They are in many processed foods, such as cookies, crackers, and snack foods. Healthy fats · Monounsaturated fat. Monounsaturated fats are liquid at room temperature but get solid when refrigerated. Eating foods that are high in this fat may help lower your \"bad\" (LDL) cholesterol, keep your \"good\" (HDL) cholesterol level up, and lower your chances of getting coronary artery disease. This fat is found in canola oil, olive oil, peanut oil, olives, avocados, nuts, and nut butters. · Polyunsaturated fat. Polyunsaturated fats are liquid at room temperature. They are in safflower, sunflower, and corn oils. They are also the main fat in seafood. Omega-3 fatty acids are types of polyunsaturated fat. Eating fish may lower your chances of getting coronary artery disease. Fatty fish such as salmon and mackerel contain these healthy fatty acids. So do ground flaxseeds and flaxseed oil, soybeans, walnuts, and seeds. Why cut down on unhealthy fats? Eating foods that contain saturated fats can raise the LDL (\"bad\") cholesterol in your blood. Having a high level of LDL cholesterol increases your chance of hardening of the arteries (atherosclerosis), which can lead to heart disease, heart attack, and stroke. Trans fat raises the level of \"bad\" LDL cholesterol in your blood and may lower the \"good\" HDL cholesterol in your blood. Trans fat can raise your risk of heart disease, heart attack, and stroke. In general: · No more than 10% of your daily calories should come from saturated fat. This is about 20 grams in a 2,000-calorie diet. · No more than 10% of your daily calories should come from polyunsaturated fat. This is about 20 grams in a 2,000-calorie diet. · Monounsaturated fats can be up to 15% of your daily calories. This is about 25 to 30 grams in a 2,000-calorie diet.  
If you're not sure how much fat you should be eating or how many calories you need each day to stay at a healthy weight, talk to a registered dietitian. He or she can help you create a plan that's right for you. What can you do to cut down on fat? Foods like cheese, butter, sausage, and desserts can have a lot of unhealthy fats. Try these tips for healthier meals at home and when you eat out. At home · Fill up on fruits, vegetables, and whole grains. · Think of meat as a side dish instead of as the main part of your meal. 
· When you do eat meat, make it extra-lean ground beef (97% lean), ground turkey breast (without skin added), meats with fat trimmed off before cooking, or skinless chicken. · Try main dishes that use whole wheat pasta, brown rice, dried beans, or vegetables. · Use cooking methods that use little or no fat, such as broiling, steaming, or grilling. Use cooking spray instead of oil. If you use oil, use a monounsaturated oil, such as canola or olive oil. · Read food labels on canned, bottled, or packaged foods. Choose those with little saturated fat and no trans fat. When eating out at a restaurant · Order foods that are broiled or poached instead of fried or breaded. · Cut back on the amount of butter or margarine that you use on bread. Use small amounts of olive oil instead. · Order sauces, gravies, and salad dressings on the side, and use only a little. · When you order pasta, choose tomato sauce instead of cream sauce. · Ask for salsa with your baked potato instead of sour cream, butter, cheese, or pat. Where can you learn more? Go to http://jemma-javi.info/. Enter D224 in the search box to learn more about \"Learning About Low-Fat Eating. \" Current as of: November 7, 2018 Content Version: 12.2 © 9879-5674 [employer]. Care instructions adapted under license by [email or web address] (which disclaims liability or warranty for this information).  If you have questions about a medical condition or this instruction, always ask your healthcare professional. [place] [address] any warranty or liability for your use of this information.

## 2019-11-26 NOTE — PROGRESS NOTES
Chief Complaint   Patient presents with    Hypertension    Cholesterol Problem       1. Have you been to the ER, urgent care clinic since your last visit? Hospitalized since your last visit? Yes   [facility]    2. Have you seen or consulted any other health care providers outside of the [address] [place] [address] since your last visit? Include any pap smears or colon screening. Yes  cardiology and orthopedic for back   After obtaining consent, and per orders of NP Jayme, injection of influenza given by [facility]. Patient instructed to remain in clinic for 20 minutes afterwards, and to report any adverse reaction to me immediately. Pt tolerated well. No reactions.

## 2019-12-03 RX ORDER — EZETIMIBE 10 MG/1
10 TABLET ORAL DAILY
Qty: 30 TAB | Refills: 2 | Status: SHIPPED | OUTPATIENT
Start: 2019-12-03 | End: 2019-12-27 | Stop reason: SDUPTHER

## 2019-12-04 NOTE — PROGRESS NOTES
Pt informed of it being ok per NP Elaina Cullen that Fenofibrate and Noralee Kimberly are ok to be taking at the same time in the evening.  Thank you

## 2019-12-04 NOTE — PROGRESS NOTES
His triglycerides are acceptable at 237 however, his total cholesterol and LDL cholesterol are elevated and need treatment  Since he is allergic to statins I would like to start him on Zetia 10 mg 1 tablet by mouth once a day, a prescription has been sent to his pharmacy on file  The rest of his labs are normal or unchanged

## 2019-12-04 NOTE — PROGRESS NOTES
Called and spioke to pt and is aware of needing to get Zetia 10mg per NP Jayme. But wanted to know if he needs to still be on Fenofibrate? I advised him I would need to ask NP Gladystine Shall and get back to him.  Thank you

## 2019-12-27 DIAGNOSIS — I10 ESSENTIAL HYPERTENSION: ICD-10-CM

## 2019-12-27 DIAGNOSIS — E78.2 MIXED HYPERLIPIDEMIA: ICD-10-CM

## 2019-12-27 RX ORDER — AMLODIPINE BESYLATE 5 MG/1
TABLET ORAL
Qty: 90 TAB | Refills: 0 | Status: SHIPPED | OUTPATIENT
Start: 2019-12-27 | End: 2020-02-03 | Stop reason: SDUPTHER

## 2019-12-27 RX ORDER — EZETIMIBE 10 MG/1
10 TABLET ORAL DAILY
Qty: 30 TAB | Refills: 2 | Status: SHIPPED | OUTPATIENT
Start: 2019-12-27 | End: 2020-02-03 | Stop reason: SDUPTHER

## 2019-12-27 RX ORDER — ISOSORBIDE MONONITRATE 30 MG/1
30 TABLET, EXTENDED RELEASE ORAL DAILY
Qty: 30 TAB | Refills: 1 | Status: SHIPPED | OUTPATIENT
Start: 2019-12-27 | End: 2020-02-03 | Stop reason: SDUPTHER

## 2019-12-27 RX ORDER — NEBIVOLOL 5 MG/1
TABLET ORAL
Qty: 90 TAB | Refills: 0 | Status: SHIPPED | OUTPATIENT
Start: 2019-12-27 | End: 2020-02-03 | Stop reason: SDUPTHER

## 2019-12-27 RX ORDER — GLUCOSAMINE SULFATE 1500 MG
2000 POWDER IN PACKET (EA) ORAL EVERY OTHER DAY
Qty: 90 CAP | Refills: 0 | Status: SHIPPED | OUTPATIENT
Start: 2019-12-27 | End: 2021-01-05 | Stop reason: SDUPTHER

## 2019-12-27 RX ORDER — FENOFIBRATE 145 MG/1
TABLET, COATED ORAL
Qty: 90 TAB | Refills: 0 | Status: SHIPPED | OUTPATIENT
Start: 2019-12-27 | End: 2020-02-03 | Stop reason: SDUPTHER

## 2020-01-29 DIAGNOSIS — I10 ESSENTIAL HYPERTENSION: ICD-10-CM

## 2020-01-29 DIAGNOSIS — E78.2 MIXED HYPERLIPIDEMIA: ICD-10-CM

## 2020-01-29 RX ORDER — AMLODIPINE BESYLATE 5 MG/1
TABLET ORAL
Qty: 90 TAB | Refills: 0 | OUTPATIENT
Start: 2020-01-29

## 2020-01-29 RX ORDER — FENOFIBRATE 145 MG/1
TABLET, COATED ORAL
Qty: 90 TAB | Refills: 0 | OUTPATIENT
Start: 2020-01-29

## 2020-01-29 RX ORDER — EZETIMIBE 10 MG/1
10 TABLET ORAL DAILY
Qty: 30 TAB | Refills: 2 | OUTPATIENT
Start: 2020-01-29

## 2020-01-29 RX ORDER — ISOSORBIDE MONONITRATE 30 MG/1
30 TABLET, EXTENDED RELEASE ORAL DAILY
Qty: 30 TAB | Refills: 1 | OUTPATIENT
Start: 2020-01-29

## 2020-01-29 RX ORDER — NEBIVOLOL 5 MG/1
TABLET ORAL
Qty: 90 TAB | Refills: 0 | OUTPATIENT
Start: 2020-01-29

## 2020-01-29 RX ORDER — GLUCOSAMINE SULFATE 1500 MG
2000 POWDER IN PACKET (EA) ORAL EVERY OTHER DAY
Qty: 90 CAP | Refills: 0 | OUTPATIENT
Start: 2020-01-29

## 2020-01-29 NOTE — TELEPHONE ENCOUNTER
Requested Prescriptions     Pending Prescriptions Disp Refills    ezetimibe (ZETIA) 10 mg tablet 30 Tab 2     Sig: Take 1 Tab by mouth daily.  nebivolol (BYSTOLIC) 5 mg tablet 90 Tab 0     Sig: take 1 tablet by mouth once daily    amLODIPine (NORVASC) 5 mg tablet 90 Tab 0     Sig: take 1 tablet by mouth once daily    cholecalciferol (VITAMIN D3) 25 mcg (1,000 unit) cap 90 Cap 0     Sig: Take 2 Caps by mouth every other day.  isosorbide mononitrate ER (IMDUR) 30 mg tablet 30 Tab 1     Sig: Take 1 Tab by mouth daily.     fenofibrate nanocrystallized (TRICOR) 145 mg tablet 90 Tab 0     Sig: take 1 tablet by mouth once daily

## 2020-03-05 DIAGNOSIS — E78.2 MIXED HYPERLIPIDEMIA: ICD-10-CM

## 2020-03-05 DIAGNOSIS — I10 ESSENTIAL HYPERTENSION: ICD-10-CM

## 2020-03-05 NOTE — TELEPHONE ENCOUNTER
Requested Prescriptions     Pending Prescriptions Disp Refills    nebivoloL (BYSTOLIC) 5 mg tablet 30 Tab 3     Sig: take 1 tablet by mouth once daily    fenofibrate nanocrystallized (TRICOR) 145 mg tablet 30 Tab 3     Sig: take 1 tablet by mouth once daily

## 2020-03-06 RX ORDER — NEBIVOLOL 5 MG/1
TABLET ORAL
Qty: 30 TAB | Refills: 3 | Status: SHIPPED | OUTPATIENT
Start: 2020-03-06 | End: 2020-03-31 | Stop reason: SDUPTHER

## 2020-03-06 RX ORDER — FENOFIBRATE 145 MG/1
TABLET, COATED ORAL
Qty: 30 TAB | Refills: 3 | Status: SHIPPED | OUTPATIENT
Start: 2020-03-06 | End: 2020-03-31 | Stop reason: SDUPTHER

## 2020-03-31 ENCOUNTER — VIRTUAL VISIT (OUTPATIENT)
Dept: FAMILY MEDICINE CLINIC | Age: 65
End: 2020-03-31

## 2020-03-31 DIAGNOSIS — I10 ESSENTIAL HYPERTENSION: Primary | ICD-10-CM

## 2020-03-31 DIAGNOSIS — E78.2 MIXED HYPERLIPIDEMIA: ICD-10-CM

## 2020-03-31 DIAGNOSIS — F17.200 HEAVY SMOKER: ICD-10-CM

## 2020-03-31 RX ORDER — EZETIMIBE 10 MG/1
10 TABLET ORAL DAILY
Qty: 90 TAB | Refills: 1 | Status: SHIPPED | OUTPATIENT
Start: 2020-03-31 | End: 2020-10-05

## 2020-03-31 RX ORDER — ISOSORBIDE MONONITRATE 30 MG/1
30 TABLET, EXTENDED RELEASE ORAL DAILY
Qty: 90 TAB | Refills: 1 | Status: SHIPPED | OUTPATIENT
Start: 2020-03-31 | End: 2021-01-07 | Stop reason: SDUPTHER

## 2020-03-31 RX ORDER — AMLODIPINE BESYLATE 10 MG/1
10 TABLET ORAL DAILY
Qty: 90 TAB | Refills: 1 | Status: SHIPPED | OUTPATIENT
Start: 2020-03-31 | End: 2020-09-30

## 2020-03-31 RX ORDER — CLOPIDOGREL BISULFATE 75 MG/1
TABLET ORAL
Qty: 90 TAB | Refills: 1 | Status: SHIPPED | OUTPATIENT
Start: 2020-03-31 | End: 2020-11-02

## 2020-03-31 RX ORDER — NEBIVOLOL 5 MG/1
TABLET ORAL
Qty: 90 TAB | Refills: 1 | Status: SHIPPED | OUTPATIENT
Start: 2020-03-31 | End: 2020-10-05

## 2020-03-31 RX ORDER — FENOFIBRATE 145 MG/1
TABLET, COATED ORAL
Qty: 90 TAB | Refills: 1 | Status: SHIPPED | OUTPATIENT
Start: 2020-03-31 | End: 2020-12-10

## 2020-03-31 NOTE — PROGRESS NOTES
Chief Complaint   Patient presents with    Hypertension    Cholesterol Problem         1. Have you been to the ER, urgent care clinic since your last visit? Hospitalized since your last visit? No    2. Have you seen or consulted any other health care providers outside of the [address] [place] [address] since your last visit? Include any pap smears or colon screening.  Nephrology last month

## 2020-03-31 NOTE — PROGRESS NOTES
Abelardo Stewart is a 76 y.o. male who was seen by synchronous (real-time) audio-video technology on 3/31/2020. Consent:  He and/or his healthcare decision maker is aware that this patient-initiated Telehealth encounter is a billable service, with coverage as determined by his insurance carrier. He is aware that he may receive a bill and has provided verbal consent to proceed: Yes    I was in the office while conducting this encounter. Assessment & Plan:   Diagnoses and all orders for this visit:    1. Essential hypertension  -     amLODIPine (NORVASC) 10 mg tablet; Take 1 Tab by mouth daily. -     clopidogreL (PLAVIX) 75 mg tab; take 1 tablet by mouth once daily  -     isosorbide mononitrate ER (IMDUR) 30 mg tablet; Take 1 Tab by mouth daily. -     nebivoloL (Bystolic) 5 mg tablet; take 1 tablet by mouth once daily  Readings per his home automatic cuff taken during this virtual visit revealed an elevated blood pressure  Apparently he has not had his isosorbide for the past month and has only been taking 5 mg of the Norvasc  His last nephrology visit he was instructed to increase Norvasc to 10 mg however he was hesitant not knowing if cardiology would agree with this  Instructed him to increase his Norvasc to the 10 mg as ordered by nephrology as blood pressure control is part of their specialty  Medication refills given    2. Mixed hyperlipidemia  -     ezetimibe (ZETIA) 10 mg tablet; Take 1 Tab by mouth daily. -     fenofibrate nanocrystallized (TRICOR) 145 mg tablet; take 1 tablet by mouth once daily  Medication refills given  Encouraged to eat a low-fat heart healthy diet    3. Heavy smoker  He stopped taking the Chantix and has resumed smoking, he states stress is what takes him to smoking  States he is going to try to quit again        Follow-up and Dispositions    · Return for 1 week, VV hypertension.          Coding Help - Use CPT Codes 24037-56607, 99908-02943 for Established and New Patients respectively, either employing EM elements or Time rules. Other codes (example consult codes) may also apply. 712  Subjective:   Abelardo Stewart was seen for Hypertension and Cholesterol Problem    Finished cardiac rehab, went back to work  Works as a  as a fill in  B/P at the last cardiac rehab vists was 128/80  Took b/p at home during visit  Demonstrated proper usage, automatic machine, 151/92 HR 62  He is a little upset after trying to get on the VV  Went to nephrology 2/18, b/p there was 150/87, was told to increase Norvasc to 10mg from 5mg  He did not do this because he was not sure if it would be OK with cardiology    Hypertension:   Patient reports taking medications as instructed. no, Has not had isosorbide for almost a month, is only taking 5mg amlodipine   Medication side effects noted. no  Headache upon wakening. no   Home BP monitoring in range of 543'Q systolic. Do you experience chest pain/pressure or SOB with exertion? no, endorse able to climb 2-3 flights of stairs daily  Key CAD CHF Meds             amLODIPine (NORVASC) 10 mg tablet (Taking) Take 1 Tab by mouth daily. clopidogreL (PLAVIX) 75 mg tab (Taking) take 1 tablet by mouth once daily    isosorbide mononitrate ER (IMDUR) 30 mg tablet (Taking) Take 1 Tab by mouth daily. nebivoloL (Bystolic) 5 mg tablet (Taking) take 1 tablet by mouth once daily    ezetimibe (ZETIA) 10 mg tablet (Taking) Take 1 Tab by mouth daily. fenofibrate nanocrystallized (TRICOR) 145 mg tablet (Taking) take 1 tablet by mouth once daily    aspirin 81 mg chewable tablet (Taking) Take 81 mg by mouth. nitroglycerin (NITROSTAT) 0.4 mg SL tablet (Taking) 0.4 mg by SubLINGual route. Omega-3 Fatty Acids (FISH OIL) 500 mg cap (Taking) Take  by mouth.             SMOKING  Stopped chantix, it helped him some  Is back to smoking, starts due to stress, 1 pack lasts 2.5 days  States when this pack is gone he is not going to buy anymore and start the chantix back    Prior to Admission medications    Medication Sig Start Date End Date Taking? Authorizing Provider   amLODIPine (NORVASC) 10 mg tablet Take 1 Tab by mouth daily. 3/31/20  Yes Lum Nones, NP   clopidogreL (PLAVIX) 75 mg tab take 1 tablet by mouth once daily 3/31/20  Yes Lum Nones, NP   isosorbide mononitrate ER (IMDUR) 30 mg tablet Take 1 Tab by mouth daily. 3/31/20  Yes Lum Nones, NP   nebivoloL (Bystolic) 5 mg tablet take 1 tablet by mouth once daily 3/31/20  Yes Lum Nones, NP   ezetimibe (ZETIA) 10 mg tablet Take 1 Tab by mouth daily. 3/31/20  Yes Lum Nones, NP   fenofibrate nanocrystallized (TRICOR) 145 mg tablet take 1 tablet by mouth once daily 3/31/20  Yes Lum Nones, NP   cholecalciferol (VITAMIN D3) 25 mcg (1,000 unit) cap Take 2 Caps by mouth every other day. 12/27/19  Yes Lum Nones, NP   aspirin 81 mg chewable tablet Take 81 mg by mouth. 10/2/19  Yes Provider, Historical   nitroglycerin (NITROSTAT) 0.4 mg SL tablet 0.4 mg by SubLINGual route. 9/16/19  Yes Provider, Historical   Omega-3 Fatty Acids (FISH OIL) 500 mg cap Take  by mouth. Yes Provider, Historical   ascorbic acid, vitamin C, (VITAMIN C) 500 mg tablet Take  by mouth. Yes Provider, Historical   nebivoloL (BYSTOLIC) 5 mg tablet take 1 tablet by mouth once daily 3/6/20 3/31/20  Lum Nones, NP   fenofibrate nanocrystallized (TRICOR) 145 mg tablet take 1 tablet by mouth once daily 3/6/20 3/31/20  Lum Nones, NP   clopidogreL (PLAVIX) 75 mg tab take 1 tablet by mouth once daily 2/3/20 3/31/20  Lum Nones, NP   isosorbide mononitrate ER (IMDUR) 30 mg tablet Take 1 Tab by mouth daily. 2/3/20 3/31/20  Lum Nones, NP   ezetimibe (ZETIA) 10 mg tablet Take 1 Tab by mouth daily.  2/3/20 3/31/20  Lum Nones, NP   amLODIPine (NORVASC) 5 mg tablet take 1 tablet by mouth once daily 2/3/20 3/31/20  Tegan Clements NP varenicline (CHANTIX) 1 mg tablet Take 1 Tab by mouth two (2) times a day. 11/26/19 3/31/20  Jason Fraga NP     Allergies   Allergen Reactions    Simvastatin Other (comments)       Patient Active Problem List   Diagnosis Code    Essential hypertension I10    Mixed hyperlipidemia E78.2    Heavy smoker F17.200    Chronic bilateral low back pain with bilateral sciatica M54.42, M54.41, G89.29    Atherosclerosis of coronary artery I25.10    Renal artery stenosis (HCC) I70.1    CKD (chronic kidney disease) stage 3, GFR 30-59 ml/min ([facility]) N18.3    Coronary artery disease with stable angina pectoris ([facility]) I25.118     Patient Active Problem List    Diagnosis Date Noted    Coronary artery disease with stable angina pectoris ([facility] [address] 11/26/2019    CKD (chronic kidney disease) stage 3, GFR 30-59 ml/min ([facility] [address] 08/26/2019    Mixed hyperlipidemia 02/26/2018    Heavy smoker 02/26/2018    Chronic bilateral low back pain with bilateral sciatica 02/26/2018    Essential hypertension 05/23/2017    Atherosclerosis of coronary artery 07/08/2013    Renal artery stenosis (HCC) 06/20/2013     Current Outpatient Medications   Medication Sig Dispense Refill    amLODIPine (NORVASC) 10 mg tablet Take 1 Tab by mouth daily. 90 Tab 1    clopidogreL (PLAVIX) 75 mg tab take 1 tablet by mouth once daily 90 Tab 1    isosorbide mononitrate ER (IMDUR) 30 mg tablet Take 1 Tab by mouth daily. 90 Tab 1    nebivoloL (Bystolic) 5 mg tablet take 1 tablet by mouth once daily 90 Tab 1    ezetimibe (ZETIA) 10 mg tablet Take 1 Tab by mouth daily. 90 Tab 1    fenofibrate nanocrystallized (TRICOR) 145 mg tablet take 1 tablet by mouth once daily 90 Tab 1    cholecalciferol (VITAMIN D3) 25 mcg (1,000 unit) cap Take 2 Caps by mouth every other day. 90 Cap 0    aspirin 81 mg chewable tablet Take 81 mg by mouth.  nitroglycerin (NITROSTAT) 0.4 mg SL tablet 0.4 mg by SubLINGual route.       Omega-3 Fatty Acids (FISH OIL) 500 mg cap Take by mouth.  ascorbic acid, vitamin C, (VITAMIN C) 500 mg tablet Take  by mouth. Allergies   Allergen Reactions    Simvastatin Other (comments)     Past Medical History:   Diagnosis Date    Hypercholesterolemia     Hypertension      Past Surgical History:   Procedure Laterality Date    CARDIAC SURG PROCEDURE UNLIST      stent      Family History   Problem Relation Age of Onset    Diabetes Mother     Heart Disease Mother     Stroke Father     Diabetes Maternal Grandmother      Social History     Tobacco Use    Smoking status: Current Every Day Smoker     Packs/day: 1.00     Types: Cigarettes    Smokeless tobacco: Never Used   Substance Use Topics    Alcohol use: No       ROS As stated in HPI, otherwise all others negative. PHYSICAL EXAMINATION:  [ INSTRUCTIONS:  \"[x]\" Indicates a positive item  \"[]\" Indicates a negative item  -- DELETE ALL ITEMS NOT EXAMINED]  Vital Signs: (As obtained by patient/caregiver at home)  There were no vitals taken for this visit.      Constitutional: [x] Appears well-developed and well-nourished [x] No apparent distress      [] Abnormal -     Mental status: [x] Alert and awake  [x] Oriented to person/place/time [x] Able to follow commands    [] Abnormal -     Eyes:   EOM    [x]  Normal    [] Abnormal -   Sclera  [x]  Normal    [] Abnormal -          Discharge [x]  None visible   [] Abnormal -     HENT: [x] Normocephalic, atraumatic  [] Abnormal -   [x] Mouth/Throat: Mucous membranes are moist     External Ears [x] Normal  [] Abnormal -    Neck: [x] No visualized mass [] Abnormal -     Pulmonary/Chest: [x] Respiratory effort normal   [x] No visualized signs of difficulty breathing or respiratory distress        [] Abnormal -      Musculoskeletal:   [x] Normal gait with no signs of ataxia         [x] Normal range of motion of neck        [] Abnormal -     Neurological:        [x] No Facial Asymmetry (Cranial nerve 7 motor function) (limited exam due to video visit) [x] No gaze palsy        [] Abnormal -          Skin:        [x] No significant exanthematous lesions or discoloration noted on facial skin         [] Abnormal -            Psychiatric:       [x] Normal Affect [] Abnormal -        [x] No Hallucinations    Other pertinent observable physical exam findings:-  Blood pressure recheck at end of visit performed by patient: 141/83 HR 60      We discussed the expected course, resolution and complications of the diagnosis(es) in detail. Medication risks, benefits, costs, interactions, and alternatives were discussed as indicated. I advised him to contact the office if his condition worsens, changes or fails to improve as anticipated. He expressed understanding with the diagnosis(es) and plan. Pursuant to the emergency declaration under the [facility], [facility] waiver authority and the [employer] Act, this Virtual  Visit was conducted, with patient's consent, to reduce the patient's risk of exposure to COVID-19 and provide continuity of care for an established patient. Services were provided through a video synchronous discussion virtually to substitute for in-person clinic visit. Hernando Diaz NP      An After Visit Summary was printed and given to the patient. All diagnosis have been discussed with the patient and all of the patient's questions have been answered. Follow-up and Dispositions    · Return for 1 week, VV hypertension. Regi Harmon, [facility]   [place] [id number]

## 2020-04-13 ENCOUNTER — VIRTUAL VISIT (OUTPATIENT)
Dept: FAMILY MEDICINE CLINIC | Age: 65
End: 2020-04-13

## 2020-05-14 ENCOUNTER — VIRTUAL VISIT (OUTPATIENT)
Dept: FAMILY MEDICINE CLINIC | Age: 65
End: 2020-05-14

## 2020-05-14 DIAGNOSIS — F17.200 HEAVY SMOKER: ICD-10-CM

## 2020-05-14 DIAGNOSIS — I10 ESSENTIAL HYPERTENSION: Primary | ICD-10-CM

## 2020-05-14 DIAGNOSIS — Z71.6 ENCOUNTER FOR SMOKING CESSATION COUNSELING: ICD-10-CM

## 2020-05-14 NOTE — PROGRESS NOTES
1. Have you been to the ER, urgent care clinic since your last visit? Hospitalized since your last visit? No    2. Have you seen or consulted any other health care providers outside of the [address] [place] [address] since your last visit? Include any pap smears or colon screening.  No     Chief Complaint   Patient presents with    Follow Up Chronic Condition    Hypertension

## 2020-05-14 NOTE — PROGRESS NOTES
[address] [place] [address]. Formerly [facility], later [facility]      Yanet Borges is a 76 y.o. male who was seen by synchronous (real-time) audio-video technology on 5/14/2020. Consent: Yanet Borges, who was seen by synchronous (real-time) audio-video technology, and/or his healthcare decision maker, is aware that this patient-initiated, Telehealth encounter on 5/14/2020 is a billable service, with coverage as determined by his insurance carrier. He is aware that he may receive a bill and has provided verbal consent to proceed: Yes. Assessment & Plan:   Diagnoses and all orders for this visit:    1. Essential hypertension  Blood pressure stable, continue same medications   Labs at next office visit    2. Encounter for smoking cessation counseling  The patient was counseled on the dangers of tobacco use, and was advised to quit. Reviewed strategies to maximize success, including removing cigarettes and smoking materials from environment, stress management, substitution of other forms of reinforcement, support of family/friends, written materials and pharmacotherapy (patch, gum, medications). Handouts on smoking cessation provided with AVS, instructed how to find the AVS in my chart    3. Heavy smoker  Order low dose lung CT after discussion with patient at next visit    Follow-up and Dispositions    · Return in about 3 months (around 8/14/2020) for Medicare annual wellness exam, hypertension, hyperlipidemia, 30 minutes, office visit. 712  Subjective:   Yanet Borges is a 76 y.o. male who was seen for   Follow Up Chronic Condition and Hypertension    Hypertension:   Patient reports taking medications as instructed. yes   Medication side effects noted. no  Headache upon wakening. no   Home BP monitoring in range of 420/07 to 492/61 systolic. Do you experience chest pain/pressure or SOB with exertion?  no  Key CAD CHF Meds             amLODIPine (NORVASC) 10 mg tablet (Taking) Take 1 Tab by mouth daily. clopidogreL (PLAVIX) 75 mg tab (Taking) take 1 tablet by mouth once daily    isosorbide mononitrate ER (IMDUR) 30 mg tablet (Taking) Take 1 Tab by mouth daily. nebivoloL (Bystolic) 5 mg tablet (Taking) take 1 tablet by mouth once daily    ezetimibe (ZETIA) 10 mg tablet (Taking) Take 1 Tab by mouth daily. fenofibrate nanocrystallized (TRICOR) 145 mg tablet (Taking) take 1 tablet by mouth once daily    aspirin 81 mg chewable tablet (Taking) Take 81 mg by mouth. nitroglycerin (NITROSTAT) 0.4 mg SL tablet (Taking) 0.4 mg by SubLINGual route. Omega-3 Fatty Acids (FISH OIL) 500 mg cap (Taking) Take  by mouth. 2 tablets daily          Tobacco abuse  Vanessa Brady received smoking cessation counseling   He/she has a 56 pack year history. Patient advised to quit and reluctant to quit. Patient counseled on the dangers of tobacco use, nicotine and vaping. At this time the patientis not willing to quit smoking. Reviewed strategies to maximize success, including removing cigarettes and smoking materials from environment, stress management, substitution of other forms of reinforcement, support of family/friends, written materials and pharmacotherapy (chantix, he still has a supply from a prior prescription). Suggested that the patient set a quit date. Identify triggers identification: identify situations, internal states, activities to help achieve goal of smoking cessation            Prior to Admission medications    Medication Sig Start Date End Date Taking? Authorizing Provider   amLODIPine (NORVASC) 10 mg tablet Take 1 Tab by mouth daily. 3/31/20  Yes Kadi Lira NP   clopidogreL (PLAVIX) 75 mg tab take 1 tablet by mouth once daily 3/31/20  Yes Kadi Lira NP   isosorbide mononitrate ER (IMDUR) 30 mg tablet Take 1 Tab by mouth daily.  3/31/20  Yes Kadi Lira NP   nebivoloL (Bystolic) 5 mg tablet take 1 tablet by mouth once daily 3/31/20  Yes Mona Smart NP   ezetimibe (ZETIA) 10 mg tablet Take 1 Tab by mouth daily. 3/31/20  Yes Mona Smart NP   fenofibrate nanocrystallized (TRICOR) 145 mg tablet take 1 tablet by mouth once daily 3/31/20  Yes Mona Smart NP   cholecalciferol (VITAMIN D3) 25 mcg (1,000 unit) cap Take 2 Caps by mouth every other day. 12/27/19  Yes Mona Smart NP   aspirin 81 mg chewable tablet Take 81 mg by mouth. 10/2/19  Yes Provider, Historical   nitroglycerin (NITROSTAT) 0.4 mg SL tablet 0.4 mg by SubLINGual route. 9/16/19  Yes Provider, Historical   Omega-3 Fatty Acids (FISH OIL) 500 mg cap Take  by mouth. 2 tablets daily   Yes Provider, Historical   ascorbic acid, vitamin C, (VITAMIN C) 500 mg tablet Take  by mouth. Yes Provider, Historical     Allergies   Allergen Reactions    Simvastatin Other (comments)       Patient Active Problem List   Diagnosis Code    Essential hypertension I10    Mixed hyperlipidemia E78.2    Heavy smoker F17.200    Chronic bilateral low back pain with bilateral sciatica M54.42, M54.41, G89.29    Atherosclerosis of coronary artery I25.10    Renal artery stenosis ([facility]) I70.1    CKD (chronic kidney disease) stage 3, GFR 30-59 ml/min ([facility]) N18.3    Coronary artery disease with stable angina pectoris ([facility]) I25.118     Patient Active Problem List    Diagnosis Date Noted    Coronary artery disease with stable angina pectoris ([facility] 11/26/2019    CKD (chronic kidney disease) stage 3, GFR 30-59 ml/min ([facility] 08/26/2019    Mixed hyperlipidemia 02/26/2018    Heavy smoker 02/26/2018    Chronic bilateral low back pain with bilateral sciatica 02/26/2018    Essential hypertension 05/23/2017    Atherosclerosis of coronary artery 07/08/2013    [facility] 06/20/2013     Current Outpatient Medications   Medication Sig Dispense Refill    amLODIPine (NORVASC) 10 mg tablet Take 1 Tab by mouth daily.  90 Tab 1    clopidogreL (PLAVIX) 75 mg tab take 1 tablet by mouth once daily 90 Tab 1    isosorbide mononitrate ER (IMDUR) 30 mg tablet Take 1 Tab by mouth daily. 90 Tab 1    nebivoloL (Bystolic) 5 mg tablet take 1 tablet by mouth once daily 90 Tab 1    ezetimibe (ZETIA) 10 mg tablet Take 1 Tab by mouth daily. 90 Tab 1    fenofibrate nanocrystallized (TRICOR) 145 mg tablet take 1 tablet by mouth once daily 90 Tab 1    cholecalciferol (VITAMIN D3) 25 mcg (1,000 unit) cap Take 2 Caps by mouth every other day. 90 Cap 0    aspirin 81 mg chewable tablet Take 81 mg by mouth.  nitroglycerin (NITROSTAT) 0.4 mg SL tablet 0.4 mg by SubLINGual route.  Omega-3 Fatty Acids (FISH OIL) 500 mg cap Take  by mouth. 2 tablets daily      ascorbic acid, vitamin C, (VITAMIN C) 500 mg tablet Take  by mouth. Allergies   Allergen Reactions    Simvastatin Other (comments)     Past Medical History:   Diagnosis Date    Hypercholesterolemia     Hypertension      Past Surgical History:   Procedure Laterality Date    CARDIAC SURG PROCEDURE UNLIST      stent      Family History   Problem Relation Age of Onset    Diabetes Mother     Heart Disease Mother     Stroke Father     Diabetes Maternal Grandmother      Social History     Tobacco Use    Smoking status: Current Every Day Smoker     Packs/day: 1.00     Types: Cigarettes    Smokeless tobacco: Never Used   Substance Use Topics    Alcohol use: No       ROS  As stated in HPI, otherwise all others negative. Objective: There were no vitals taken for this visit.    General: alert, cooperative, no distress   Mental  status: normal mood, behavior, speech, dress, motor activity, and thought processes, able to follow commands   HENT: NCAT   Neck: no visualized mass   Resp: no respiratory distress   Neuro: no gross deficits   Skin: no discoloration or lesions of concern on visible areas   Psychiatric: normal affect, consistent with stated mood, no evidence of hallucinations     Additional exam findings: We discussed the expected course, resolution and complications of the diagnosis(es) in detail. Medication risks, benefits, costs, interactions, and alternatives were discussed as indicated. I advised him to contact the office if his condition worsens, changes or fails to improve as anticipated. He expressed understanding with the diagnosis(es) and plan. Daniella Espinoza is a 76 y.o. male who was evaluated by a video visit encounter for concerns as above. Patient identification was verified prior to start of the visit. A caregiver was present when appropriate. Due to this being a TeleHealth encounter ([facility]), evaluation of the following organ systems was limited: Vitals/Constitutional/EENT/Resp/CV/GI//MS/Neuro/Skin/Heme-Lymph-Imm. Pursuant to the emergency declaration under the [facility], [facility] waiver authority and the [employer] Act, this Virtual  Visit was conducted, with patient's (and/or legal guardian's) consent, to reduce the patient's risk of exposure to COVID-19 and provide necessary medical care. Services were provided through a video synchronous discussion virtually to substitute for in-person clinic visit. Patient and provider were located at their individual homes. An After Visit Summary was printed and given to the patient. All diagnosis have been discussed with the patient and all of the patient's questions have been answered. Follow-up and Dispositions    · Return in about 3 months (around 8/14/2020) for Medicare annual wellness exam, hypertension, hyperlipidemia, 30 minutes, office visit. [place] [id number] [address].   Tulio Amaya

## 2020-05-14 NOTE — PATIENT INSTRUCTIONS
S/s of withdrawal: Typically peak in one to two weeks but may continue for months, increased appetite or weight gain, depressed mood, apathy, insomnia, irritability, frustration, anger, anxiety, difficulty concentrating, restlessness Learning About Benefits From Quitting Smoking How does quitting smoking make you healthier? If you're thinking about quitting smoking, you may have a few reasons to be smoke-free. Your health may be one of them. · When you quit smoking, you lower your risks for cancer, lung disease, heart attack, stroke, blood vessel disease, and blindness from macular degeneration. · When you're smoke-free, you get sick less often, and you heal faster. You are less likely to get colds, flu, bronchitis, and pneumonia. · As a nonsmoker, you may find that your mood is better and you are less stressed. When and how will you feel healthier? Quitting has real health benefits that start from day 1 of being smoke-free. And the longer you stay smoke-free, the healthier you get and the better you feel. The first hours · After just 20 minutes, your blood pressure and heart rate go down. That means there's less stress on your heart and blood vessels. · Within 12 hours, the level of carbon monoxide in your blood drops back to normal. That makes room for more oxygen. With more oxygen in your body, you may notice that you have more energy than when you smoked. After 2 weeks · Your lungs start to work better. · Your risk of heart attack starts to drop. After 1 month · When your lungs are clear, you cough less and breathe deeper, so it's easier to be active. · Your sense of taste and smell return. That means you can enjoy food more than you have since you started smoking. Over the years · After 1 year, your risk of heart disease is half what it would be if you kept smoking. · After 5 years, your risk of stroke starts to shrink.  Within a few years after that, it's about the same as if you'd never smoked. · After 10 years, your risk of dying from lung cancer is cut by about half. And your risk for many other types of cancer is lower too. How would quitting help others in your life? When you quit smoking, you improve the health of everyone who now breathes in your smoke. · Their heart, lung, and cancer risks drop, much like yours. · They are sick less. For babies and small children, living smoke-free means they're less likely to have ear infections, pneumonia, and bronchitis. · If you're a woman who is or will be pregnant someday, quitting smoking means a healthier . · Children who are close to you are less likely to become adult smokers. Where can you learn more? Go to http://jemmaKAYAKjavi.info/. Enter [id number] [id number] in the search box to learn more about \"Learning About Benefits From Quitting Smoking. \" Current as of: 2018 Content Version: 11.9 © 0006-0952 [employer]. Care instructions adapted under license by [employer] (which disclaims liability or warranty for this information). If you have questions about a medical condition or this instruction, always ask your healthcare professional. [place] [address] any warranty or liability for your use of this information. Stopping Smoking: Care Instructions Your Care Instructions Cigarette smokers crave the nicotine in cigarettes. Giving it up is much harder than simply changing a habit. Your body has to stop craving the nicotine. It is hard to quit, but you can do it. There are many tools that people use to quit smoking. You may find that combining tools works best for you. There are several steps to quitting. First you get ready to quit. Then you get support to help you. After that, you learn new skills and behaviors to become a nonsmoker. For many people, a necessary step is getting and using medicine. Your doctor will help you set up the plan that best meets your needs. You may want to attend a smoking cessation program to help you quit smoking. When you choose a program, look for one that has proven success. Ask your doctor for ideas. You will greatly increase your chances of success if you take medicine as well as get counseling or join a cessation program. 
Some of the changes you feel when you first quit tobacco are uncomfortable. Your body will miss the nicotine at first, and you may feel short-tempered and grumpy. You may have trouble sleeping or concentrating. Medicine can help you deal with these symptoms. You may struggle with changing your smoking habits and rituals. The last step is the tricky one: Be prepared for the smoking urge to continue for a time. This is a lot to deal with, but keep at it. You will feel better. Follow-up care is a key part of your treatment and safety. Be sure to make and go to all appointments, and call your doctor if you are having problems. It's also a good idea to know your test results and keep a list of the medicines you take. How can you care for yourself at home? · Ask your family, friends, and coworkers for support. You have a better chance of quitting if you have help and support. · Join a support group, such as Nicotine Anonymous, for people who are trying to quit smoking. · Consider signing up for a smoking cessation program, such as the American Lung Association's Freedom from Smoking program. 
· Get text messaging support. Go to the website at www.smokefree. gov to sign up for the [facility] program. 
· Set a quit date. Pick your date carefully so that it is not right in the middle of a big deadline or stressful time. Once you quit, do not even take a puff. Get rid of all ashtrays and lighters after your last cigarette. Clean your house and your clothes so that they do not smell of smoke. · Learn how to be a nonsmoker. Think about ways you can avoid those things that make you reach for a cigarette. ? Avoid situations that put you at greatest risk for smoking. For some people, it is hard to have a drink with friends without smoking. For others, they might skip a coffee break with coworkers who smoke. ? Change your daily routine. Take a different route to work or eat a meal in a different place. · Cut down on stress. Calm yourself or release tension by doing an activity you enjoy, such as reading a book, taking a hot bath, or gardening. · Talk to your doctor or pharmacist about nicotine replacement therapy, which replaces the nicotine in your body. You still get nicotine but you do not use tobacco. Nicotine replacement products help you slowly reduce the amount of nicotine you need. These products come in several forms, many of them available over-the-counter: ? Nicotine patches ? Nicotine gum and lozenges ? Nicotine inhaler · Ask your doctor about bupropion (Wellbutrin) or varenicline (Chantix), which are prescription medicines. They do not contain nicotine. They help you by reducing withdrawal symptoms, such as stress and anxiety. · Some people find hypnosis, acupuncture, and massage helpful for ending the smoking habit. · Eat a healthy diet and get regular exercise. Having healthy habits will help your body move past its craving for nicotine. · Be prepared to keep trying. Most people are not successful the first few times they try to quit. Do not get mad at yourself if you smoke again. Make a list of things you learned and think about when you want to try again, such as next week, next month, or next year. Where can you learn more? Go to http://jemma-javi.info/. Enter B370 in the search box to learn more about \"Stopping Smoking: Care Instructions. \" Current as of: September 26, 2018 Content Version: 11.9 © 3507-5358 [employer]. Care instructions adapted under license by [employer] (which disclaims liability or warranty for this information). If you have questions about a medical condition or this instruction, always ask your healthcare professional. [address] any warranty or liability for your use of this information. Deciding About Using Medicines To Quit Smoking How can you decide about using medicines to quit smoking? What are the medicines you can use? Your doctor may prescribe varenicline (Chantix) or bupropion (Zyban). These medicines can help you cope with cravings for tobacco. They are pills that don't contain nicotine. You also can use nicotine replacement products. These do contain nicotine. There are many types. · Gum and lozenges slowly release nicotine into your mouth. · Patches stick to your skin. They slowly release nicotine into your bloodstream. 
· An inhaler has a hastings that contains nicotine. You breathe in a puff of nicotine vapor through your mouth and throat. · Nasal spray releases a mist that contains nicotine. What are key points about this decision? · Using medicines can double your chances of quitting smoking. They can ease cravings and withdrawal symptoms. · Getting counseling along with using medicine can raise your chances of quitting even more. · If you smoke fewer than 5 cigarettes a day, you may not need medicines to help you quit smoking. · These medicines have less nicotine than cigarettes. And by itself, nicotine is not nearly as harmful as smoking. The tars, carbon monoxide, and other toxic chemicals in tobacco cause the harmful effects. · The side effects of nicotine replacement products depend on the type of product. For example, a patch can make your skin red and itchy. Medicines in pill form can make you sick to your stomach.  They can also cause dry mouth and trouble sleeping. For most people, the side effects are not bad enough to make them stop using the products. Why might you choose to use medicines to quit smoking? · You have tried on your own to stop smoking, but you were not able to stop. · You smoke more than 5 cigarettes a day. · You want to increase your chances of quitting smoking. · You want to reduce your cravings and withdrawal symptoms. · You feel the benefits of medicine outweigh the side effects. Why might you choose not to use medicine? · You want to try quitting on your own by stopping all at once (\"cold turkey\"). · You want to cut back slowly on the number of cigarettes you smoke. · You smoke fewer than 5 cigarettes a day. · You do not like using medicine. · You feel the side effects of medicines outweigh the benefits. · You are worried about the cost of medicines. Your decision Thinking about the facts and your feelings can help you make a decision that is right for you. Be sure you understand the benefits and risks of your options, and think about what else you need to do before you make the decision. Where can you learn more? Go to http://jemma-javi.info/. Enter A961 in the search box to learn more about \"Deciding About Using Medicines To Quit Smoking. \" Current as of: September 26, 2018 Content Version: 11.9 © 9264-3938 [employer]. Care instructions adapted under license by [employer] (which disclaims liability or warranty for this information). If you have questions about a medical condition or this instruction, always ask your healthcare professional. [address] any warranty or liability for your use of this information.

## 2020-08-14 ENCOUNTER — VIRTUAL VISIT (OUTPATIENT)
Dept: FAMILY MEDICINE CLINIC | Age: 65
End: 2020-08-14

## 2020-08-14 DIAGNOSIS — Z13.6 SCREENING FOR CARDIOVASCULAR CONDITION: ICD-10-CM

## 2020-08-14 DIAGNOSIS — Z00.00 MEDICARE ANNUAL WELLNESS VISIT, SUBSEQUENT: Primary | ICD-10-CM

## 2020-08-14 DIAGNOSIS — I10 ESSENTIAL HYPERTENSION: ICD-10-CM

## 2020-08-14 DIAGNOSIS — R20.0 BILATERAL LEG NUMBNESS: ICD-10-CM

## 2020-08-14 DIAGNOSIS — E78.2 MIXED HYPERLIPIDEMIA: ICD-10-CM

## 2020-08-14 DIAGNOSIS — M79.662 BILATERAL CALF PAIN: ICD-10-CM

## 2020-08-14 DIAGNOSIS — I26.94 MULTIPLE SUBSEGMENTAL PULMONARY EMBOLI WITHOUT ACUTE COR PULMONALE (HCC): ICD-10-CM

## 2020-08-14 DIAGNOSIS — Z87.891 PERSONAL HISTORY OF TOBACCO USE, PRESENTING HAZARDS TO HEALTH: ICD-10-CM

## 2020-08-14 DIAGNOSIS — M79.661 BILATERAL CALF PAIN: ICD-10-CM

## 2020-08-14 DIAGNOSIS — Z71.89 ADVANCED DIRECTIVES, COUNSELING/DISCUSSION: ICD-10-CM

## 2020-08-14 DIAGNOSIS — Z12.5 SPECIAL SCREENING FOR MALIGNANT NEOPLASM OF PROSTATE: ICD-10-CM

## 2020-08-14 DIAGNOSIS — Z13.31 SCREENING FOR DEPRESSION: ICD-10-CM

## 2020-08-14 RX ORDER — VARENICLINE TARTRATE 1 MG/1
1 TABLET, FILM COATED ORAL 2 TIMES DAILY
Qty: 60 TAB | Refills: 5 | Status: SHIPPED
Start: 2020-08-14 | End: 2021-08-13

## 2020-08-14 RX ORDER — RIVAROXABAN 20 MG/1
TABLET, FILM COATED ORAL
COMMUNITY
Start: 2020-08-11 | End: 2021-01-12

## 2020-08-14 NOTE — ACP (ADVANCE CARE PLANNING)
Advance Care Planning       Advance Care Planning (ACP) Physician/NP/PA (Provider) Conversation        Date of ACP Conversation: 8/14/2020    Conversation Conducted with:   Patient with Decision Making Eusebio Barone Maker:    Current Designated Health Care Decision Maker:   (If there is a valid [place] named in the [address]" box in the ACP activity, but it is not visible above, be sure to open that field and then select the health care decision maker relationship (ie \"primary\") in the blank space to the right of the name.)    Note: Assess and validate information in current ACP documents, as indicated. Note: If the relationship of these Decision-Makers to the patient does NOT follow your state's Next of Kin hierarchy, recommend that patient complete ACP document that meets state-specific requirements to allow them to act on the patient's behalf when appropriate. Care Preferences:    Hospitalization: \"If your health worsens and it becomes clear that your chance of recovery is unlikely, what would your preference be regarding hospitalization? \"  If the patient would want hospitalization, answer \"yes\". If the patient would prefer comfort-focused treatment without hospitalization, answer \"no\". no      Ventilation: \"If you were in your present state of health and suddenly became very ill and were unable to breathe on your own, what would your preference be about the use of a ventilator (breathing machine) if it was available to you? \"    If patient would desire the use of a ventilator (breathing machine), answer \"yes\", if not answer \"no\":yes    \"If your health worsens and it becomes clear that your chance of recovery is unlikely, what would your preference be about the use of a ventilator (breathing machine) if it was available to you? \"   no      Resuscitation:  \"CPR works best to restart the heart when there is a sudden event, like a heart attack, in someone who is otherwise healthy. Unfortunately, CPR does not typically restart the heart for people who have serious health conditions or who are very sick. \"    \"In the event your heart stopped as a result of an underlying serious health condition, would you want attempts to be made to restart your heart (answer \"yes\" for attempt to resuscitate) or would you prefer a natural death (answer \"no\" for do not attempt to resuscitate)? \"   yes    NOTE: If the patient has a valid advance directive AND provides care preference(s) that are inconsistent with that prior directive, advise the patient to consider either: creating a new advance directive that complies with state-specific requirements; or, if that is not possible, orally revoking that prior directive in accordance with state-specific requirements, which must be documented in the EHR.     Conversation Outcomes / Follow-Up Plan:   Recommended completion of Advance Directive      Length of Voluntary ACP Conversation in minutes:  <16 minutes (Non-Billable)      Curly Yu NP

## 2020-08-14 NOTE — PROGRESS NOTES
[address]               [phone]                 [address]      Navarro Ye is a 76 y.o. male who was seen by synchronous (real-time) audio-video technology on 8/14/2020. Consent: Navarro Ye, who was seen by synchronous (real-time) audio-video technology, and/or his healthcare decision maker, is aware that this patient-initiated, Telehealth encounter on 8/14/2020 is a billable service, with coverage as determined by his insurance carrier. He is aware that he may receive a bill and has provided verbal consent to proceed: Yes. Assessment & Plan:   Diagnoses and all orders for this visit:    1. Medicare annual wellness visit, subsequent  -     REFERRAL TO AUDIOLOGY  Completed today to include ETOH and depression screening  Endorses decreased hearing bilat and wishes to have further evaluation    2. Advanced directives, counseling/discussion  -     ADVANCE CARE PLANNING FIRST 30 MINS  Discussion completed and contacts updated    3. Multiple subsegmental pulmonary emboli without acute cor pulmonale ([facility] [address]  Recent ED visit for SOB, CTA revealed PE  Started on Xarelto for 14 days, he is holding his plavix as directed by his cardiologist who was apparently consulted during the ED visit  Instructed Mr. Tolu Parisi to contact his cardiologist for f/u    4. Mixed hyperlipidemia  -     LIPID PANEL; Future  Have him come in for labs, last I can find are from 2019    5. Essential hypertension  -     HEPATIC FUNCTION PANEL;  Future  He had bmp in ED, will get LFT's  Stable per home checks  In ED b/p was 142/65, however he was experiencing SOB    6. Bilateral calf pain  Endorses calf pain and leg numbness with ambulation  This was mention in dr. Mercedez mejia from [address] [place] [address] dated 3/2018, at that time an injection was planned, however there are no further notes available to review  He is order a [place] AAA, will see what these results reveal, if negative, proceed with JOHN or further testing that may be indicated     7. Bilateral leg numbness    8. Personal history of tobacco use, presenting hazards to health  -     varenicline (CHANTIX) 1 mg tablet; Take 1 Tab by mouth two (2) times a day. He desires to restart chantix for smoking cessation  He had some left over pills and has started to take them already  Educated he needs to continue the chantix for at least 6 months after quitting for the best possible outcome    9. Screening for depression  -     DEPRESSION SCREEN ANNUAL    10. Special screening for malignant neoplasm of prostate  -     PSA SCREENING (SCREENING); Future    11. Screening for cardiovascular condition  -     US EXAM SCREENING AAA; Future      Follow-up and Dispositions    · Return for ASAP, Labs, AND, 3 months, HLD, HTN, 30 min, office only. 712  Subjective:   Sascha Buchanan is a 76 y.o. male who was seen for   Annual Wellness Visit (Due for colonoscopy ); Cholesterol Problem; Hypertension; and Labs    Hypertension:   Patient reports taking medications as instructed. yes   Medication side effects noted. no  Headache upon wakening. no   Home BP monitoring in range of 210/24 systolic. Do you experience chest pain/pressure or SOB with exertion? no  Maintain a low salt diet? yes  Key CAD CHF Meds             Xarelto 20 mg tab tablet (Taking)     amLODIPine (NORVASC) 10 mg tablet (Taking) Take 1 Tab by mouth daily. isosorbide mononitrate ER (IMDUR) 30 mg tablet (Taking) Take 1 Tab by mouth daily. nebivoloL (Bystolic) 5 mg tablet (Taking) take 1 tablet by mouth once daily    ezetimibe (ZETIA) 10 mg tablet (Taking) Take 1 Tab by mouth daily. fenofibrate nanocrystallized (TRICOR) 145 mg tablet (Taking) take 1 tablet by mouth once daily    aspirin 81 mg chewable tablet (Taking) Take 81 mg by mouth. nitroglycerin (NITROSTAT) 0.4 mg SL tablet (Taking) 0.4 mg by SubLINGual route.     Omega-3 Fatty Acids (FISH OIL) 500 mg cap (Taking) Take  by mouth. 2 tablets daily    clopidogreL (PLAVIX) 75 mg tab take 1 tablet by mouth once daily        HLD:  Has been compliant with meds  all of the time  Compliant with low-fat diet. most of the time    Denies myalgias or other side effects. no  Cholesterol, total   Date Value Ref Range Status   11/26/2019 279 (H) <200 MG/DL Final     Triglyceride   Date Value Ref Range Status   11/26/2019 237 (H) <150 MG/DL Final     Comment:     The drugs N-acetylcysteine (NAC) and  Metamiszole have been found to cause falsely  low results in this chemical assay. Please  be sure to submit blood samples obtained  BEFORE administration of either of these  drugs to assure correct results. HDL Cholesterol   Date Value Ref Range Status   11/26/2019 38 (L) 40 - 60 MG/DL Final     LDL, calculated   Date Value Ref Range Status   11/26/2019 193.6 (H) 0 - 100 MG/DL Final   ]  Key Antihyperlipidemia Meds             ezetimibe (ZETIA) 10 mg tablet (Taking) Take 1 Tab by mouth daily. fenofibrate nanocrystallized (TRICOR) 145 mg tablet (Taking) take 1 tablet by mouth once daily    Omega-3 Fatty Acids (FISH OIL) 500 mg cap (Taking) Take  by mouth. 2 tablets daily         Pulmonary embolism  Woke up feeling SOB on 8/9/20, went to ED  CTA completed:   1. Study is suboptimal secondary to respiratory motion. No main or lobar pulmonary embolus. There is a linear filling defect in a right lower lobe artery segmental branch, indeterminate. This may represent artifact, but pulmonary embolus cannot be excluded.  Linear morphology suggests this could be a chronic pulmonary embolus, but are nonspecific.     2. Enlarged main pulmonary artery, suggesting pulmonary arterial hypertension.    3. Reflux of contrast into the IVC and hepatic veins could be related to contrast bolus timing versus elevated right heart pressure     4. Extensive atherosclerotic disease.     Per the patient his cardiologist was consulted and the patient was started on Xarelto for 14 days, he is to hold his plavix during this time but continue his aspirin     Leg numbness  Evaluated by [facility], Dr. Leigh Kerns  When walking  numbess in hips and buttocks and calvs will start to cramp  Distance he can walk varies  Initially told was all r/t his back problems 1/2019  Had PT and x-rays  States Dr. Leigh Kerns ordered another test but he cannot remember what it was and workman's comp would not pay for it so he did not get it done      Prior to Admission medications    Medication Sig Start Date End Date Taking? Authorizing Provider   Xarelto 20 mg tab tablet  8/11/20  Yes Provider, Historical   varenicline (CHANTIX) 1 mg tablet Take 1 Tab by mouth two (2) times a day. 8/14/20  Yes Nigel Lee NP   amLODIPine (NORVASC) 10 mg tablet Take 1 Tab by mouth daily. Patient taking differently: Take 5 mg by mouth daily. 3/31/20  Yes Nigel Lee NP   isosorbide mononitrate ER (IMDUR) 30 mg tablet Take 1 Tab by mouth daily. 3/31/20  Yes Nigel Lee NP   nebivoloL (Bystolic) 5 mg tablet take 1 tablet by mouth once daily 3/31/20  Yes Nigel Lee NP   ezetimibe (ZETIA) 10 mg tablet Take 1 Tab by mouth daily. 3/31/20  Yes Nigel Lee NP   fenofibrate nanocrystallized (TRICOR) 145 mg tablet take 1 tablet by mouth once daily 3/31/20  Yes Nigel Lee NP   cholecalciferol (VITAMIN D3) 25 mcg (1,000 unit) cap Take 2 Caps by mouth every other day. 12/27/19  Yes Nigel Lee NP   aspirin 81 mg chewable tablet Take 81 mg by mouth. 10/2/19  Yes Provider, Historical   nitroglycerin (NITROSTAT) 0.4 mg SL tablet 0.4 mg by SubLINGual route. 9/16/19  Yes Provider, Historical   Omega-3 Fatty Acids (FISH OIL) 500 mg cap Take  by mouth. 2 tablets daily   Yes Provider, Historical   ascorbic acid, vitamin C, (VITAMIN C) 500 mg tablet Take  by mouth.    Yes Provider, Historical   clopidogreL (PLAVIX) 75 mg tab take 1 tablet by mouth once daily 3/31/20   Hollie Abraham, LIZETT     Allergies   Allergen Reactions    Simvastatin Other (comments)       Patient Active Problem List   Diagnosis Code    Essential hypertension I10    Mixed hyperlipidemia E78.2    Heavy smoker F17.200    Chronic bilateral low back pain with bilateral sciatica M54.42, M54.41, G89.29    Atherosclerosis of coronary artery I25.10    Renal artery stenosis ([facility]) I70.1    CKD (chronic kidney disease) stage 3, GFR 30-59 ml/min ([facility]) N18.3    Coronary artery disease with stable angina pectoris ([facility]) I25.118    Multiple subsegmental pulmonary emboli without acute cor pulmonale ([facility]) I26.94    Bilateral calf pain M79.661, M79.662    Bilateral leg numbness R20.0     Patient Active Problem List    Diagnosis Date Noted    Multiple subsegmental pulmonary emboli without acute cor pulmonale ([facility] [address] 08/14/2020    Bilateral calf pain 08/14/2020    Bilateral leg numbness 08/14/2020    Coronary artery disease with stable angina pectoris ([facility] [address] 11/26/2019    CKD (chronic kidney disease) stage 3, GFR 30-59 ml/min ([facility] [address] 08/26/2019    Mixed hyperlipidemia 02/26/2018    Heavy smoker 02/26/2018    Chronic bilateral low back pain with bilateral sciatica 02/26/2018    Essential hypertension 05/23/2017    Atherosclerosis of coronary artery 07/08/2013    Renal artery stenosis ([facility] [address] 06/20/2013     Current Outpatient Medications   Medication Sig Dispense Refill    Xarelto 20 mg tab tablet       varenicline (CHANTIX) 1 mg tablet Take 1 Tab by mouth two (2) times a day. 60 Tab 5    amLODIPine (NORVASC) 10 mg tablet Take 1 Tab by mouth daily. (Patient taking differently: Take 5 mg by mouth daily.) 90 Tab 1    isosorbide mononitrate ER (IMDUR) 30 mg tablet Take 1 Tab by mouth daily. 90 Tab 1    nebivoloL (Bystolic) 5 mg tablet take 1 tablet by mouth once daily 90 Tab 1    ezetimibe (ZETIA) 10 mg tablet Take 1 Tab by mouth daily.  90 Tab 1    fenofibrate nanocrystallized (TRICOR) 145 mg tablet take 1 tablet by mouth once daily 90 Tab 1    cholecalciferol (VITAMIN D3) 25 mcg (1,000 unit) cap Take 2 Caps by mouth every other day. 90 Cap 0    aspirin 81 mg chewable tablet Take 81 mg by mouth.  nitroglycerin (NITROSTAT) 0.4 mg SL tablet 0.4 mg by SubLINGual route.  Omega-3 Fatty Acids (FISH OIL) 500 mg cap Take  by mouth. 2 tablets daily      ascorbic acid, vitamin C, (VITAMIN C) 500 mg tablet Take  by mouth.  clopidogreL (PLAVIX) 75 mg tab take 1 tablet by mouth once daily 90 Tab 1     Allergies   Allergen Reactions    Simvastatin Other (comments)     Past Medical History:   Diagnosis Date    Hypercholesterolemia     Hypertension      Past Surgical History:   Procedure Laterality Date    CARDIAC SURG PROCEDURE UNLIST      stent      Family History   Problem Relation Age of Onset    Diabetes Mother     Heart Disease Mother     Stroke Father     Diabetes Maternal Grandmother      Social History     Tobacco Use    Smoking status: Current Every Day Smoker     Packs/day: 1.00     Types: Cigarettes    Smokeless tobacco: Never Used   Substance Use Topics    Alcohol use: No       ROS  As stated in HPI, otherwise all others negative. Objective: There were no vitals taken for this visit. General: alert, cooperative, no distress   Mental  status: normal mood, behavior, speech, dress, motor activity, and thought processes, able to follow commands   HENT: NCAT   Neck: no visualized mass   Resp: no respiratory distress   Neuro: no gross deficits   Skin: no discoloration or lesions of concern on visible areas   Psychiatric: normal affect, consistent with stated mood, no evidence of hallucinations     Additional exam findings: We discussed the expected course, resolution and complications of the diagnosis(es) in detail. Medication risks, benefits, costs, interactions, and alternatives were discussed as indicated.   I advised him to contact the office if his condition worsens, changes or fails to improve as anticipated. He expressed understanding with the diagnosis(es) and plan. Álvaro Baig is a 76 y.o. male who was evaluated by a video visit encounter for concerns as above. Patient identification was verified prior to start of the visit. A caregiver was present when appropriate. Due to this being a TeleHealth encounter ([facility]), evaluation of the following organ systems was limited: Vitals/Constitutional/EENT/Resp/CV/GI//MS/Neuro/Skin/Heme-Lymph-Imm. Pursuant to the emergency declaration under the [facility] [place], [facility] waiver authority and the [employer] Act, this Virtual  Visit was conducted, with patient's (and/or legal guardian's) consent, to reduce the patient's risk of exposure to COVID-19 and provide necessary medical care. Services were provided through a video synchronous discussion virtually to substitute for in-person clinic visit. Patient and provider were located at their individual homes. An After Visit Summary was printed and given to the patient. All diagnosis have been discussed with the patient and all of the patient's questions have been answered. Follow-up and Dispositions    · Return for ASAP, Labs, AND, 3 months, HLD, HTN, 30 min, office only. Chantelle Guerrero [place] [id number] [address]    This is the Subsequent Medicare Annual Wellness Exam, performed 12 months or more after the Initial AWV or the last Subsequent AWV    I have reviewed the patient's medical history in detail and updated the computerized patient record.      History     Patient Active Problem List   Diagnosis Code    Essential hypertension I10    Mixed hyperlipidemia E78.2    Heavy smoker F17.200    Chronic bilateral low back pain with bilateral sciatica M54.42, M54.41, G89.29    Atherosclerosis of coronary artery I25.10    Renal artery stenosis ([facility]) I70.1    CKD (chronic kidney disease) stage 3, GFR 30-59 ml/min ([facility]) N18.3    Coronary artery disease with stable angina pectoris ([facility]) I25.118    Multiple subsegmental pulmonary emboli without acute cor pulmonale ([facility]) I26.94    Bilateral calf pain M79.661, M79.662    Bilateral leg numbness R20.0     Past Medical History:   Diagnosis Date    Hypercholesterolemia     Hypertension       Past Surgical History:   Procedure Laterality Date    CARDIAC SURG PROCEDURE UNLIST      stent      Current Outpatient Medications   Medication Sig Dispense Refill    Xarelto 20 mg tab tablet       varenicline (CHANTIX) 1 mg tablet Take 1 Tab by mouth two (2) times a day. 60 Tab 5    amLODIPine (NORVASC) 10 mg tablet Take 1 Tab by mouth daily. (Patient taking differently: Take 5 mg by mouth daily.) 90 Tab 1    isosorbide mononitrate ER (IMDUR) 30 mg tablet Take 1 Tab by mouth daily. 90 Tab 1    nebivoloL (Bystolic) 5 mg tablet take 1 tablet by mouth once daily 90 Tab 1    ezetimibe (ZETIA) 10 mg tablet Take 1 Tab by mouth daily. 90 Tab 1    fenofibrate nanocrystallized (TRICOR) 145 mg tablet take 1 tablet by mouth once daily 90 Tab 1    cholecalciferol (VITAMIN D3) 25 mcg (1,000 unit) cap Take 2 Caps by mouth every other day. 90 Cap 0    aspirin 81 mg chewable tablet Take 81 mg by mouth.  nitroglycerin (NITROSTAT) 0.4 mg SL tablet 0.4 mg by SubLINGual route.  Omega-3 Fatty Acids (FISH OIL) 500 mg cap Take  by mouth. 2 tablets daily      ascorbic acid, vitamin C, (VITAMIN C) 500 mg tablet Take  by mouth.       clopidogreL (PLAVIX) 75 mg tab take 1 tablet by mouth once daily 90 Tab 1     Allergies   Allergen Reactions    Simvastatin Other (comments)       Family History   Problem Relation Age of Onset    Diabetes Mother     Heart Disease Mother     Stroke Father     Diabetes Maternal Grandmother      Social History     Tobacco Use    Smoking status: Current Every Day Smoker     Packs/day: 1.00     Types: Cigarettes    Smokeless tobacco: Never Used   Substance Use Topics    Alcohol use: No       Depression Risk Factor Screening:     3 most recent PHQ Screens 8/14/2020   Little interest or pleasure in doing things Not at all   Feeling down, depressed, irritable, or hopeless Not at all   Total Score PHQ 2 0       Alcohol Risk Factor Screening (MALE > 65): Do you average more 1 drink per night or more than 7 drinks a week: No    In the past three months have you have had more than 4 drinks containing alcohol on one occasion: Yes      Functional Ability and Level of Safety:   Hearing: feels like his hearing is horrible, would like to have testing     Activities of Daily Living: The home contains: grab bars  Patient does total self care     Ambulation: with mild difficulty     Fall Risk:  Fall Risk Assessment, last 12 mths 8/14/2020   Able to walk? Yes   Fall in past 12 months? No     Abuse Screen:  Patient is not abused       Cognitive Screening   Has your family/caregiver stated any concerns about your memory: no    Cognitive Screening: Normal - three word recall: 3/3/    Patient Care Team   Patient Care Team:  Sade Mcgill NP as PCP - General (Nurse Practitioner)  Milana Gentile MD (Nephrology)    Assessment/Plan   Education and counseling provided:  Are appropriate based on today's review and evaluation  End-of-Life planning (with patient's consent)    Diagnoses and all orders for this visit:    1. Medicare annual wellness visit, subsequent  -     REFERRAL TO AUDIOLOGY    2. Advanced directives, counseling/discussion  -     ADVANCE CARE PLANNING FIRST 30 MINS    3. Multiple subsegmental pulmonary emboli without acute cor pulmonale (HCC)    4. Mixed hyperlipidemia  -     LIPID PANEL; Future    5. Essential hypertension  -     HEPATIC FUNCTION PANEL; Future    6. Bilateral calf pain    7.  Bilateral leg numbness    8. Personal history of tobacco use, presenting hazards to health  -     varenicline (CHANTIX) 1 mg tablet; Take 1 Tab by mouth two (2) times a day. 9. Screening for depression  -     DEPRESSION SCREEN ANNUAL    10. Special screening for malignant neoplasm of prostate  -     PSA SCREENING (SCREENING); Future    11. Screening for cardiovascular condition  -     US EXAM SCREENING AAA; Future        Health Maintenance Due   Topic Date Due    Shingrix Vaccine Age 49> (1 of 2) 01/12/2004    Colonoscopy  05/30/2018    GLAUCOMA SCREENING Q2Y  01/12/2019    Pneumococcal 65+ years (2 of 2 - PPSV23) 02/08/2020    Influenza Age 5 to Adult  08/01/2020    Medicare Yearly Exam  08/26/2020       Kathyanne Meckel, who was evaluated through a synchronous (real-time) audio-video encounter, and/or his healthcare decision maker, is aware that it is a billable service, with coverage as determined by his insurance carrier. He provided verbal consent to proceed: Yes, and patient identification was verified. It was conducted pursuant to the emergency declaration under the [address] [place] [address], [address] [place] [address] authority and the [employer] Act. A caregiver was present when appropriate. Ability to conduct physical exam was limited. I was in the office. The patient was at home.     Dorene Andrews NP

## 2020-08-14 NOTE — PROGRESS NOTES
Chief Complaint   Patient presents with    Annual Wellness Visit     Due for colonoscopy     Cholesterol Problem    Hypertension    Labs           1. Have you been to the ER, urgent care clinic since your last visit? Hospitalized since your last visit? On sun for SOB Abel Shah 8/10/20- Blood Clots in Lung    2. Have you seen or consulted any other health care providers outside of the [address] [place] [address] since your last visit? Include any pap smears or colon screening.  No

## 2020-08-19 ENCOUNTER — HOSPITAL ENCOUNTER (OUTPATIENT)
Dept: ULTRASOUND IMAGING | Age: 65
Discharge: HOME | End: 2020-08-19
Attending: NURSE PRACTITIONER
Payer: MEDICARE

## 2020-08-19 DIAGNOSIS — Z13.6 SCREENING FOR CARDIOVASCULAR CONDITION: ICD-10-CM

## 2020-08-19 PROCEDURE — 76706 US ABDL AORTA SCREEN AAA: CPT

## 2020-09-01 NOTE — PROGRESS NOTES
Please let him know the AAA screening showed a small aneurysm. This is less than 5cm. Repeat the screening in one year to monitor.

## 2020-09-26 DIAGNOSIS — I10 ESSENTIAL HYPERTENSION: ICD-10-CM

## 2020-09-30 RX ORDER — AMLODIPINE BESYLATE 10 MG/1
5 TABLET ORAL DAILY
Qty: 90 TAB | Refills: 0 | Status: SHIPPED | OUTPATIENT
Start: 2020-09-30 | End: 2021-01-05 | Stop reason: SDUPTHER

## 2020-10-03 DIAGNOSIS — I10 ESSENTIAL HYPERTENSION: ICD-10-CM

## 2020-10-04 DIAGNOSIS — E78.2 MIXED HYPERLIPIDEMIA: ICD-10-CM

## 2020-10-05 RX ORDER — NEBIVOLOL 5 MG/1
TABLET ORAL
Qty: 90 TAB | Refills: 0 | Status: SHIPPED | OUTPATIENT
Start: 2020-10-05 | End: 2021-01-05 | Stop reason: SDUPTHER

## 2020-10-05 RX ORDER — EZETIMIBE 10 MG/1
TABLET ORAL
Qty: 90 TAB | Refills: 2 | Status: SHIPPED | OUTPATIENT
Start: 2020-10-05 | End: 2021-01-07 | Stop reason: SDUPTHER

## 2020-11-01 DIAGNOSIS — I10 ESSENTIAL HYPERTENSION: ICD-10-CM

## 2020-11-02 RX ORDER — CLOPIDOGREL BISULFATE 75 MG/1
TABLET ORAL
Qty: 90 TAB | Refills: 2 | Status: SHIPPED | OUTPATIENT
Start: 2020-11-02 | End: 2021-07-07 | Stop reason: SDUPTHER

## 2020-12-09 DIAGNOSIS — E78.2 MIXED HYPERLIPIDEMIA: ICD-10-CM

## 2020-12-10 ENCOUNTER — HOSPITAL ENCOUNTER (OUTPATIENT)
Dept: LAB | Age: 65
Discharge: HOME | End: 2020-12-10
Payer: MEDICARE

## 2020-12-10 ENCOUNTER — APPOINTMENT (OUTPATIENT)
Dept: FAMILY MEDICINE CLINIC | Age: 65
End: 2020-12-10

## 2020-12-10 DIAGNOSIS — Z12.5 SPECIAL SCREENING FOR MALIGNANT NEOPLASM OF PROSTATE: ICD-10-CM

## 2020-12-10 DIAGNOSIS — I10 ESSENTIAL HYPERTENSION: ICD-10-CM

## 2020-12-10 DIAGNOSIS — E78.2 MIXED HYPERLIPIDEMIA: ICD-10-CM

## 2020-12-10 LAB
ALBUMIN SERPL-MCNC: 3.5 G/DL (ref 3.4–5)
ALBUMIN/GLOB SERPL: 1 {RATIO} (ref 0.8–1.7)
ALP SERPL-CCNC: 61 U/L (ref 45–117)
ALT SERPL-CCNC: 22 U/L (ref 16–61)
AST SERPL-CCNC: 18 U/L (ref 10–38)
BILIRUB DIRECT SERPL-MCNC: 0.1 MG/DL (ref 0–0.2)
BILIRUB SERPL-MCNC: 0.4 MG/DL (ref 0.2–1)
CHOLEST SERPL-MCNC: 195 MG/DL
GLOBULIN SER CALC-MCNC: 3.4 G/DL (ref 2–4)
HDLC SERPL-MCNC: 33 MG/DL (ref 40–60)
HDLC SERPL: 5.9 {RATIO} (ref 0–5)
LDLC SERPL CALC-MCNC: 126 MG/DL (ref 0–100)
LIPID PROFILE,FLP: ABNORMAL
PROT SERPL-MCNC: 6.9 G/DL (ref 6.4–8.2)
PSA SERPL-MCNC: 2.8 NG/ML (ref 0–4)
TRIGL SERPL-MCNC: 180 MG/DL (ref ?–150)
VLDLC SERPL CALC-MCNC: 36 MG/DL

## 2020-12-10 PROCEDURE — 36415 COLL VENOUS BLD VENIPUNCTURE: CPT

## 2020-12-10 PROCEDURE — 84153 ASSAY OF PSA TOTAL: CPT

## 2020-12-10 PROCEDURE — 80061 LIPID PANEL: CPT

## 2020-12-10 PROCEDURE — 80076 HEPATIC FUNCTION PANEL: CPT

## 2020-12-10 RX ORDER — FENOFIBRATE 145 MG/1
TABLET, COATED ORAL
Qty: 30 TAB | Refills: 2 | Status: SHIPPED | OUTPATIENT
Start: 2020-12-10 | End: 2021-01-07 | Stop reason: SDUPTHER

## 2021-01-05 DIAGNOSIS — I10 ESSENTIAL HYPERTENSION: ICD-10-CM

## 2021-01-05 RX ORDER — NEBIVOLOL 5 MG/1
TABLET ORAL
Qty: 90 TAB | Refills: 0 | Status: SHIPPED | OUTPATIENT
Start: 2021-01-05 | End: 2021-05-28

## 2021-01-05 RX ORDER — AMLODIPINE BESYLATE 10 MG/1
5 TABLET ORAL DAILY
Qty: 90 TAB | Refills: 0 | Status: SHIPPED | OUTPATIENT
Start: 2021-01-05 | End: 2021-10-03

## 2021-01-05 RX ORDER — GLUCOSAMINE SULFATE 1500 MG
2000 POWDER IN PACKET (EA) ORAL EVERY OTHER DAY
Qty: 90 CAP | Refills: 0 | Status: SHIPPED | OUTPATIENT
Start: 2021-01-05

## 2021-01-07 DIAGNOSIS — E78.2 MIXED HYPERLIPIDEMIA: ICD-10-CM

## 2021-01-07 DIAGNOSIS — I10 ESSENTIAL HYPERTENSION: ICD-10-CM

## 2021-01-07 RX ORDER — ISOSORBIDE MONONITRATE 30 MG/1
30 TABLET, EXTENDED RELEASE ORAL DAILY
Qty: 90 TAB | Refills: 1 | Status: SHIPPED | OUTPATIENT
Start: 2021-01-07 | End: 2021-07-07

## 2021-01-07 RX ORDER — EZETIMIBE 10 MG/1
TABLET ORAL
Qty: 90 TAB | Refills: 1 | Status: SHIPPED | OUTPATIENT
Start: 2021-01-07 | End: 2021-04-12 | Stop reason: SDUPTHER

## 2021-01-07 RX ORDER — FENOFIBRATE 145 MG/1
TABLET, COATED ORAL
Qty: 90 TAB | Refills: 1 | Status: SHIPPED | OUTPATIENT
Start: 2021-01-07 | End: 2021-07-07 | Stop reason: SDUPTHER

## 2021-01-12 ENCOUNTER — OFFICE VISIT (OUTPATIENT)
Dept: FAMILY MEDICINE CLINIC | Age: 66
End: 2021-01-12
Payer: MEDICARE

## 2021-01-12 VITALS
DIASTOLIC BLOOD PRESSURE: 78 MMHG | HEIGHT: 74 IN | OXYGEN SATURATION: 98 % | SYSTOLIC BLOOD PRESSURE: 148 MMHG | TEMPERATURE: 96.6 F | WEIGHT: 246 LBS | HEART RATE: 69 BPM | BODY MASS INDEX: 31.57 KG/M2 | RESPIRATION RATE: 16 BRPM

## 2021-01-12 DIAGNOSIS — E78.2 MIXED HYPERLIPIDEMIA: ICD-10-CM

## 2021-01-12 DIAGNOSIS — R06.02 SOB (SHORTNESS OF BREATH): ICD-10-CM

## 2021-01-12 DIAGNOSIS — I71.40 ABDOMINAL AORTIC ANEURYSM (AAA) WITHOUT RUPTURE: ICD-10-CM

## 2021-01-12 DIAGNOSIS — I70.1 RENAL ARTERY STENOSIS (HCC): ICD-10-CM

## 2021-01-12 DIAGNOSIS — I10 ESSENTIAL HYPERTENSION: Primary | ICD-10-CM

## 2021-01-12 DIAGNOSIS — I25.118 CORONARY ARTERY DISEASE OF NATIVE HEART WITH STABLE ANGINA PECTORIS, UNSPECIFIED VESSEL OR LESION TYPE (HCC): ICD-10-CM

## 2021-01-12 DIAGNOSIS — Z23 NEEDS FLU SHOT: ICD-10-CM

## 2021-01-12 DIAGNOSIS — I26.94 MULTIPLE SUBSEGMENTAL PULMONARY EMBOLI WITHOUT ACUTE COR PULMONALE (HCC): ICD-10-CM

## 2021-01-12 PROCEDURE — 90694 VACC AIIV4 NO PRSRV 0.5ML IM: CPT | Performed by: NURSE PRACTITIONER

## 2021-01-12 PROCEDURE — 90471 IMMUNIZATION ADMIN: CPT | Performed by: NURSE PRACTITIONER

## 2021-01-12 PROCEDURE — 99214 OFFICE O/P EST MOD 30 MIN: CPT | Performed by: NURSE PRACTITIONER

## 2021-01-12 NOTE — PROGRESS NOTES
[address] [place] [address]               [phone]      Marck Rosa is a 78 y.o. male and presents with Hypertension and Cholesterol Problem       Assessment/Plan:    Diagnoses and all orders for this visit:    1. Essential hypertension  -     METABOLIC PANEL, BASIC; Future  Endorses medication compliance  Labs today  encouraged to f/u with cardiology as I had suggested at our last visit    2. Mixed hyperlipidemia  Endorses medication compliance  Labs at next visit    3. Multiple subsegmental pulmonary emboli without acute cor pulmonale ([address]  Diagnosed 8/2020, only took xarelto for two weeks  Continues to have SOB and has not followed up with cardiology  Repeat CTA and restart xarelto    4. Needs flu shot  -     FLU (FLUAD QUAD INFLUENZA VACCINE,QUAD,ADJUVANTED)  Health maintenance    5. Renal artery stenosis [facility])  Assessment & Plan:  Stable, based on history, physical exam and review of pertinent labs, studies and medications; meds reconciled; continue current treatment plan. 6. Coronary artery disease of native heart with stable angina pectoris, unspecified vessel or lesion type [facility])  Assessment & Plan: This condition is managed by Specialist.      7. Abdominal aortic aneurysm (AAA) without rupture [facility])  Assessment & Plan:  Stable, based on history, physical exam and review of pertinent labs, studies and medications; meds reconciled; continue current treatment plan, medication compliance emphasized. Other orders  -     METABOLIC PANEL, BASIC  -     CKD REPORT      Follow-up and Dispositions    · Return in about 3 months (around 4/12/2021) for HLD, HTN, 15 min, VV. Subjective:  Pulmonary embolism  Diagnosed 8/9/2020 in ED, CTA showed subsegmental PE , however study limited due to motion artifact. Was on Xarelto for only 2 weeks, that is the supply he was given and then stopped.   Has noticed it does not take much to get SOB  Has not had a visit with Cardiology since the diagnoses    Hypertension:   Patient reports taking medications as instructed. yes   Medication side effects noted.no  Headache upon wakening. no   Home BP monitoring in range of 161/101's systolic.    Do you experience chest pain/pressure or SOB with exertion? yes, SOB  Maintain a low salt diet? yes  Key CAD CHF Meds             ezetimibe (ZETIA) 10 mg tablet (Taking) TAKE ONE TABLET BY MOUTH DAILY    isosorbide mononitrate ER (IMDUR) 30 mg tablet (Taking) Take 1 Tab by mouth daily.    fenofibrate nanocrystallized (TRICOR) 145 mg tablet (Taking) TAKE ONE TABLET BY MOUTH DAILY    amLODIPine (NORVASC) 10 mg tablet (Taking) Take 0.5 Tabs by mouth daily.    nebivoloL (Bystolic) 5 mg tablet (Taking) TAKE ONE TABLET BY MOUTH ONCE DAILY    clopidogreL (PLAVIX) 75 mg tab (Taking) TAKE ONE TABLET BY MOUTH DAILY    aspirin 81 mg chewable tablet (Taking) Take 81 mg by mouth.    nitroglycerin (NITROSTAT) 0.4 mg SL tablet (Taking) 0.4 mg by SubLINGual route.    Omega-3 Fatty Acids (FISH OIL) 500 mg cap (Taking) Take  by mouth. 2 tablets daily        HLD:  Has been compliant with meds  all of the time  Compliant with low-fat diet.  most of the time    Denies myalgias or other side effects. yes  Cholesterol, total   Date Value Ref Range Status   12/10/2020 195 <200 MG/DL Final     Triglyceride   Date Value Ref Range Status   12/10/2020 180 (H) <150 MG/DL Final     Comment:     The drugs N-acetylcysteine (NAC) and  Metamiszole have been found to cause falsely  low results in this chemical assay. Please  be sure to submit blood samples obtained  BEFORE administration of either of these  drugs to assure correct results.       HDL Cholesterol   Date Value Ref Range Status   12/10/2020 33 (L) 40 - 60 MG/DL Final     LDL, calculated   Date Value Ref Range Status   12/10/2020 126 (H) 0 - 100 MG/DL Final   ]  Key Antihyperlipidemia Meds             ezetimibe (ZETIA) 10 mg tablet (Taking) TAKE ONE TABLET BY MOUTH  DAILY    fenofibrate nanocrystallized (TRICOR) 145 mg tablet (Taking) TAKE ONE TABLET BY MOUTH DAILY    Omega-3 Fatty Acids (FISH OIL) 500 mg cap (Taking) Take  by mouth. 2 tablets daily           ROS:     ROS  As stated in HPI, otherwise all others negative. The problem list was updated as a part of today's visit. Patient Active Problem List   Diagnosis Code    Essential hypertension I10    Mixed hyperlipidemia E78.2    Heavy smoker F17.200    Chronic bilateral low back pain with bilateral sciatica M54.42, M54.41, G89.29    Atherosclerosis of coronary artery I25.10    Renal artery stenosis (HCC) I70.1    CKD (chronic kidney disease) stage 3, GFR 30-59 ml/min N18.30    Coronary artery disease with stable angina pectoris (HCC) I25.118    Multiple subsegmental pulmonary emboli without acute cor pulmonale (HCC) I26.94    Bilateral calf pain M79.661, M79.662    Bilateral leg numbness R20.0    Abdominal aortic aneurysm (AAA) without rupture ([facility]) I71.4       The PSH, FH were reviewed. SH:  Social History     Tobacco Use    Smoking status: Current Every Day Smoker     Packs/day: 1.00     Types: Cigarettes    Smokeless tobacco: Never Used   Substance Use Topics    Alcohol use: No    Drug use: No       Medications/Allergies:  Current Outpatient Medications on File Prior to Visit   Medication Sig Dispense Refill    ezetimibe (ZETIA) 10 mg tablet TAKE ONE TABLET BY MOUTH DAILY 90 Tab 1    isosorbide mononitrate ER (IMDUR) 30 mg tablet Take 1 Tab by mouth daily. 90 Tab 1    fenofibrate nanocrystallized (TRICOR) 145 mg tablet TAKE ONE TABLET BY MOUTH DAILY 90 Tab 1    amLODIPine (NORVASC) 10 mg tablet Take 0.5 Tabs by mouth daily. 90 Tab 0    nebivoloL (Bystolic) 5 mg tablet TAKE ONE TABLET BY MOUTH ONCE DAILY 90 Tab 0    cholecalciferol (Vitamin D3) 25 mcg (1,000 unit) cap Take 2 Caps by mouth every other day.  90 Cap 0    clopidogreL (PLAVIX) 75 mg tab TAKE ONE TABLET BY MOUTH DAILY 90 Tab 2  varenicline (CHANTIX) 1 mg tablet Take 1 Tab by mouth two (2) times a day. 60 Tab 5    aspirin 81 mg chewable tablet Take 81 mg by mouth.  nitroglycerin (NITROSTAT) 0.4 mg SL tablet 0.4 mg by SubLINGual route.  Omega-3 Fatty Acids (FISH OIL) 500 mg cap Take  by mouth. 2 tablets daily      ascorbic acid, vitamin C, (VITAMIN C) 500 mg tablet Take  by mouth. No current facility-administered medications on file prior to visit. Allergies   Allergen Reactions    Simvastatin Other (comments)       Objective:  Visit Vitals  BP (!) 148/78   Pulse 69   Temp (!) 96.6 °F (35.9 °C) (Oral)   Resp 16   Ht 6' 2\" (1.88 m)   Wt 246 lb (111.6 kg)   SpO2 98%   BMI 31.58 kg/m²    Body mass index is 31.58 kg/m². Physical assessment  Physical Exam  Vitals signs and nursing note reviewed. Eyes:      Conjunctiva/sclera: Conjunctivae normal.      Pupils: Pupils are equal, round, and reactive to light. Neck:      Musculoskeletal: Normal range of motion. Vascular: No JVD. Cardiovascular:      Rate and Rhythm: Normal rate and regular rhythm. Heart sounds: Normal heart sounds. No murmur. No friction rub. No gallop. Pulmonary:      Effort: Pulmonary effort is normal.      Breath sounds: Normal breath sounds. Comments: Amb pulse Ox: 95%, HR   increased WOB, able to speak in complete sentences, no dizziness, chest pain/ pressure  Musculoskeletal: Normal range of motion. Skin:     General: Skin is warm and dry. Neurological:      Mental Status: He is alert and oriented to person, place, and time.    Psychiatric:         Mood and Affect: Affect normal.         Cognition and Memory: Memory normal.         Judgment: Judgment normal.           Labwork and Ancillary Studies:    CBC w/Diff  Lab Results   Component Value Date/Time    WBC 7.9 02/08/2019 08:37 AM    HGB 14.4 02/08/2019 08:37 AM    PLATELET 584 59/93/4238 08:37 AM         Basic Metabolic Profile  Lab Results   Component Value Date/Time    Sodium 145 (H) 01/12/2021 12:00 AM    Potassium 4.5 01/12/2021 12:00 AM    Chloride 110 (H) 01/12/2021 12:00 AM    CO2 20 01/12/2021 12:00 AM    Anion gap 7 11/26/2019 10:41 AM    Glucose 82 01/12/2021 12:00 AM    BUN 29 (H) 01/12/2021 12:00 AM    Creatinine 1.69 (H) 01/12/2021 12:00 AM    BUN/Creatinine ratio 17 01/12/2021 12:00 AM    GFR est AA 48 (L) 01/12/2021 12:00 AM    GFR est non-AA 41 (L) 01/12/2021 12:00 AM    Calcium 9.6 01/12/2021 12:00 AM        Cholesterol  Lab Results   Component Value Date/Time    Cholesterol, total 195 12/10/2020 10:26 AM    HDL Cholesterol 33 (L) 12/10/2020 10:26 AM    LDL, calculated 126 (H) 12/10/2020 10:26 AM    Triglyceride 180 (H) 12/10/2020 10:26 AM    CHOL/HDL Ratio 5.9 (H) 12/10/2020 10:26 AM       Health Maintenance:   Health Maintenance   Topic Date Due    COVID-19 Vaccine (1 of 2) 01/12/1970    Shingrix Vaccine Age 50> (1 of 2) 01/12/2004    Colorectal Cancer Screening Combo  05/30/2018    GLAUCOMA SCREENING Q2Y  01/12/2019    Pneumococcal 65+ years (2 of 2 - PPSV23) 02/08/2020    Medicare Yearly Exam  08/15/2021    DTaP/Tdap/Td series (2 - Td) 02/12/2023    Lipid Screen  12/10/2025    Hepatitis C Screening  Completed    Flu Vaccine  Completed       I have discussed the diagnosis with the patient and the intended plan as seen in the above orders. The patient has received an After-Visit Summary and questions were answered concerning future plans. An After Visit Summary was printed and given to the patient. All diagnosis have been discussed with the patient and all of the patient's questions have been answered. Follow-up and Dispositions    · Return in about 3 months (around 4/12/2021) for HLD, HTN, 15 min, VV. Blaine Peterson, AGNP-BC  [address] [facility]   [address] [facility] [address]  [id number]

## 2021-01-12 NOTE — PROGRESS NOTES
Chief Complaint   Patient presents with    Hypertension    Cholesterol Problem     After obtaining consent, and per orders of NP Jayme, injection of High dose flu given by Kvng Wilson. Patient instructed to remain in clinic for 20 minutes afterwards, and to report any adverse reaction to me immediately. Pt tolerated well. No adverse reactions noted. 1. Have you been to the ER, urgent care clinic since your last visit? Hospitalized since your last visit? No    2. Have you seen or consulted any other health care providers outside of the [address] [place] [address] since your last visit? Include any pap smears or colon screening. No - have not gone to audiology- Just got new insurance     Chief Complaint   Patient presents with    Hypertension    Cholesterol Problem       3 most recent PHQ Screens 1/12/2021   Little interest or pleasure in doing things Not at all   Feeling down, depressed, irritable, or hopeless Not at all   Total Score PHQ 2 0     Fall Risk Assessment, last 12 mths 1/12/2021   Able to walk? Yes   Fall in past 12 months? 0   Do you feel unsteady?  0   Are you worried about falling 0           Visit Vitals  BP (!) 154/85   Pulse 69   Temp (!) 96.6 °F (35.9 °C) (Oral)   Resp 16   Ht 6' 2\" (1.88 m)   Wt 246 lb (111.6 kg)   SpO2 98%   BMI 31.58 kg/m²

## 2021-01-12 NOTE — PATIENT INSTRUCTIONS
DASH Diet: Care Instructions Your Care Instructions The DASH diet is an eating plan that can help lower your blood pressure. DASH stands for Dietary Approaches to Stop Hypertension. Hypertension is high blood pressure. The DASH diet focuses on eating foods that are high in calcium, potassium, and magnesium. These nutrients can lower blood pressure. The foods that are highest in these nutrients are fruits, vegetables, low-fat dairy products, nuts, seeds, and legumes. But taking calcium, potassium, and magnesium supplements instead of eating foods that are high in those nutrients does not have the same effect. The DASH diet also includes whole grains, fish, and poultry. The DASH diet is one of several lifestyle changes your doctor may recommend to lower your high blood pressure. Your doctor may also want you to decrease the amount of sodium in your diet. Lowering sodium while following the DASH diet can lower blood pressure even further than just the DASH diet alone. Follow-up care is a key part of your treatment and safety. Be sure to make and go to all appointments, and call your doctor if you are having problems. It's also a good idea to know your test results and keep a list of the medicines you take. How can you care for yourself at home? Following the DASH diet · Eat 4 to 5 servings of fruit each day. A serving is 1 medium-sized piece of fruit, ½ cup chopped or canned fruit, 1/4 cup dried fruit, or 4 ounces (½ cup) of fruit juice. Choose fruit more often than fruit juice. · Eat 4 to 5 servings of vegetables each day. A serving is 1 cup of lettuce or raw leafy vegetables, ½ cup of chopped or cooked vegetables, or 4 ounces (½ cup) of vegetable juice. Choose vegetables more often than vegetable juice. · Get 2 to 3 servings of low-fat and fat-free dairy each day. A serving is 8 ounces of milk, 1 cup of yogurt, or 1 ½ ounces of cheese. · Eat 6 to 8 servings of grains each day. A serving is 1 slice of bread, 1 ounce of dry cereal, or ½ cup of cooked rice, pasta, or cooked cereal. Try to choose whole-grain products as much as possible. · Limit lean meat, poultry, and fish to 2 servings each day. A serving is 3 ounces, about the size of a deck of cards. · Eat 4 to 5 servings of nuts, seeds, and legumes (cooked dried beans, lentils, and split peas) each week. A serving is 1/3 cup of nuts, 2 tablespoons of seeds, or ½ cup of cooked beans or peas. · Limit fats and oils to 2 to 3 servings each day. A serving is 1 teaspoon of vegetable oil or 2 tablespoons of salad dressing. · Limit sweets and added sugars to 5 servings or less a week. A serving is 1 tablespoon jelly or jam, ½ cup sorbet, or 1 cup of lemonade. · Eat less than 2,300 milligrams (mg) of sodium a day. If you limit your sodium to 1,500 mg a day, you can lower your blood pressure even more. Tips for success · Start small. Do not try to make dramatic changes to your diet all at once. You might feel that you are missing out on your favorite foods and then be more likely to not follow the plan. Make small changes, and stick with them. Once those changes become habit, add a few more changes. · Try some of the following: ? Make it a goal to eat a fruit or vegetable at every meal and at snacks. This will make it easy to get the recommended amount of fruits and vegetables each day. ? Try yogurt topped with fruit and nuts for a snack or healthy dessert. ? Add lettuce, tomato, cucumber, and onion to sandwiches. ? Combine a ready-made pizza crust with low-fat mozzarella cheese and lots of vegetable toppings. Try using tomatoes, squash, spinach, broccoli, carrots, cauliflower, and onions. ? Have a variety of cut-up vegetables with a low-fat dip as an appetizer instead of chips and dip. ? Sprinkle sunflower seeds or chopped almonds over salads. Or try adding chopped walnuts or almonds to cooked vegetables. ? Try some vegetarian meals using beans and peas. Add garbanzo or kidney beans to salads. Make burritos and tacos with mashed higgins beans or black beans. Where can you learn more? Go to http://www.gray.com/ Enter E388 in the search box to learn more about \"DASH Diet: Care Instructions. \" Current as of: December 16, 2019               Content Version: 12.6 © 9697-5225 [employer]. Care instructions adapted under license by [employer] (which disclaims liability or warranty for this information). If you have questions about a medical condition or this instruction, always ask your healthcare professional. [address] any warranty or liability for your use of this information.

## 2021-01-13 LAB
BUN SERPL-MCNC: 29 MG/DL (ref 8–27)
BUN/CREAT SERPL: 17 (ref 10–24)
CALCIUM SERPL-MCNC: 9.6 MG/DL (ref 8.6–10.2)
CHLORIDE SERPL-SCNC: 110 MMOL/L (ref 96–106)
CO2 SERPL-SCNC: 20 MMOL/L (ref 20–29)
CREAT SERPL-MCNC: 1.69 MG/DL (ref 0.76–1.27)
GLUCOSE SERPL-MCNC: 82 MG/DL (ref 65–99)
INTERPRETATION: NORMAL
POTASSIUM SERPL-SCNC: 4.5 MMOL/L (ref 3.5–5.2)
SODIUM SERPL-SCNC: 145 MMOL/L (ref 134–144)

## 2021-01-15 NOTE — PROGRESS NOTES
Please find out the last time he saw nephrology, my notes show 11/2019. If it has been this long please Classie Monico he should make a follow up appt with them. His kidney function is stable but still decreased.

## 2021-01-24 ENCOUNTER — TELEPHONE (OUTPATIENT)
Dept: FAMILY MEDICINE CLINIC | Age: 66
End: 2021-01-24

## 2021-01-24 RX ORDER — RIVAROXABAN 15 MG-20MG
KIT ORAL
Qty: 1 DOSE PACK | Refills: 0 | Status: SHIPPED | OUTPATIENT
Start: 2021-01-24 | End: 2021-04-12

## 2021-01-25 NOTE — TELEPHONE ENCOUNTER
Called pt and left a few messages in regards to LIZETT Delacruz's notes below    Chavez Ladd, LIZETT Cardoza             Please find out the last time he saw nephrology, my notes show 11/2019. If it has been this long please Colleen Rausch he should make a follow up appt with them. His kidney function is stable but still decreased. Also 2nd message below per LIZETT Delacruz- No reply from pt.

## 2021-01-25 NOTE — TELEPHONE ENCOUNTER
Please call and let . Lenard Shaffer know that due to the fact he did not finish treatment for his pulmonary embolism diagnosed in 8/2020 and he continues to have shortness of breath I have ordered a repeat CT scan and will restart his xarelto.

## 2021-01-25 NOTE — PROGRESS NOTES
Pt asked if he has seen Nephrology, he has not but weill make appt- Pt given all information in LIZETT Delacruz's note.

## 2021-01-25 NOTE — TELEPHONE ENCOUNTER
Got in touch with pt and he was given all information in previous note from NP Maureen Jefferson. [1/25/2021 1:39 PM]    Finally got ahold of Mr. Wilburn Hamper  Do you want him to continue with plavix as well with the Xarelto  been on since 2014  He has appt with Cardiology 4/23/21     [1/25/2021 1:40 PM]  Hernán Cedillo:    no, hold plavix as long as he is on xarelto, unless cardiolgy says differently     Pt understood.

## 2021-02-08 ENCOUNTER — HOSPITAL ENCOUNTER (OUTPATIENT)
Dept: CT IMAGING | Age: 66
Discharge: HOME | End: 2021-02-08
Attending: NURSE PRACTITIONER
Payer: MEDICARE

## 2021-02-08 DIAGNOSIS — R06.02 SOB (SHORTNESS OF BREATH): ICD-10-CM

## 2021-02-08 DIAGNOSIS — I26.94 MULTIPLE SUBSEGMENTAL PULMONARY EMBOLI WITHOUT ACUTE COR PULMONALE (HCC): ICD-10-CM

## 2021-02-08 PROCEDURE — 74011000636 HC RX REV CODE- 636: Performed by: NURSE PRACTITIONER

## 2021-02-08 PROCEDURE — 71275 CT ANGIOGRAPHY CHEST: CPT

## 2021-02-08 RX ADMIN — IOPAMIDOL 75 ML: 755 INJECTION, SOLUTION INTRAVENOUS at 12:31

## 2021-03-01 ENCOUNTER — TELEPHONE (OUTPATIENT)
Dept: FAMILY MEDICINE CLINIC | Age: 66
End: 2021-03-01

## 2021-03-01 NOTE — TELEPHONE ENCOUNTER
Pt called stating- he is having trouble breathing and SOB siince testing positive for COVID 19. I did give pt [facility] # and advised pt to go to ER if he is having trouble breathing. Pt verbalized understanding.

## 2021-03-11 ENCOUNTER — TELEPHONE (OUTPATIENT)
Dept: FAMILY MEDICINE CLINIC | Age: 66
End: 2021-03-11

## 2021-03-11 NOTE — PROGRESS NOTES
Please let him know the CT was negative for pulmonary embolism. He an stop xarelto and restart plavix if he is not taking. Keep follow up with cardiology.

## 2021-03-11 NOTE — TELEPHONE ENCOUNTER
Was Positive for covid back on 2/24/21. [facility] [address]. Pt stated \" He is discusted by all these doctors he has seen\" He \" keeps getting passed around\" Does not feel like \" good healthcare\" He was not given anything when [facility]. Having a really hard time breathing. He refused to go back to any emergency room or urgent care. I told pt I would inform his provider LIZETT Townsend of this.  Pt can be reached [phone]

## 2021-03-11 NOTE — TELEPHONE ENCOUNTER
Returned call. Diagnosed with COVID on 2/24  Went to ED 3/1, left AMA, had EKG done, they wanted to do a CTA but he did not want it done because of his kidneys  [facility], [facility] paperwork said to return for repeat testing and when he got there he was told he did not need a test.  States he does not need anything for his symptoms from me at this time. Has appt with Dr. Stephanie Rueda on 3/26/21  The phone became disconnected, I do not think he hung up on me.

## 2021-04-12 ENCOUNTER — VIRTUAL VISIT (OUTPATIENT)
Dept: FAMILY MEDICINE CLINIC | Age: 66
End: 2021-04-12
Payer: MEDICARE

## 2021-04-12 DIAGNOSIS — I26.94 MULTIPLE SUBSEGMENTAL PULMONARY EMBOLI WITHOUT ACUTE COR PULMONALE (HCC): ICD-10-CM

## 2021-04-12 DIAGNOSIS — E78.2 MIXED HYPERLIPIDEMIA: ICD-10-CM

## 2021-04-12 DIAGNOSIS — I10 ESSENTIAL HYPERTENSION: Primary | ICD-10-CM

## 2021-04-12 PROCEDURE — G8756 NO BP MEASURE DOC: HCPCS | Performed by: NURSE PRACTITIONER

## 2021-04-12 PROCEDURE — G8427 DOCREV CUR MEDS BY ELIG CLIN: HCPCS | Performed by: NURSE PRACTITIONER

## 2021-04-12 PROCEDURE — G8432 DEP SCR NOT DOC, RNG: HCPCS | Performed by: NURSE PRACTITIONER

## 2021-04-12 PROCEDURE — 1101F PT FALLS ASSESS-DOCD LE1/YR: CPT | Performed by: NURSE PRACTITIONER

## 2021-04-12 PROCEDURE — 3017F COLORECTAL CA SCREEN DOC REV: CPT | Performed by: NURSE PRACTITIONER

## 2021-04-12 PROCEDURE — 99214 OFFICE O/P EST MOD 30 MIN: CPT | Performed by: NURSE PRACTITIONER

## 2021-04-12 RX ORDER — EZETIMIBE 10 MG/1
TABLET ORAL
Qty: 90 TAB | Refills: 1 | Status: SHIPPED | OUTPATIENT
Start: 2021-04-12 | End: 2022-01-03

## 2021-04-12 NOTE — PROGRESS NOTES
[address] [place] [address]      Candelaria Campa is a 78 y.o. male who was seen by synchronous (real-time) audio-video technology on 4/12/2021. Consent: Candelaria Campa, who was seen by synchronous (real-time) audio-video technology, and/or his healthcare decision maker, is aware that this patient-initiated, Telehealth encounter on 4/12/2021 is a billable service, with coverage as determined by his insurance carrier. He is aware that he may receive a bill and has provided verbal consent to proceed: Yes. Assessment & Plan:   Diagnoses and all orders for this visit:    1. Essential hypertension  Endorses medication compliance  Per his report, home blood pressure checks are stable  Continue same medications, will obtain labs with next visit    2. Mixed hyperlipidemia  -     ezetimibe (ZETIA) 10 mg tablet; TAKE ONE TABLET BY MOUTH DAILY  Endorses medication compliance  Refill provided  Labs with next visit    3. Multiple subsegmental pulmonary emboli without acute cor pulmonale (HCC)  -     rivaroxaban (XARELTO) 20 mg tab tablet; Take 1 Tab by mouth daily.   Per his report cardiology recommended he continue with Xarelto and not restart Plavix at this time  Review of the chart and medication fills reveals he has not been compliant with taking the Xarelto  Stressed to him the importance of medication compliance  Refill provided    labs with next visit and MAWV  Follow-up and Dispositions    · Return in about 4 months (around 8/12/2021) for MAWV, HLD, HTN, 30 min, VV.             712  Subjective:     Health Maintenance Due   Topic Date Due    COVID-19 Vaccine (1) Never done    Shingrix Vaccine Age 50> (1 of 2) Never done    Colorectal Cancer Screening Combo  05/30/2018    Pneumococcal 65+ years (2 of 2 - PPSV23) 02/08/2020             Candelaria Campa is a 78 y.o. male who was seen for   Hypertension    Hypertension: does not want to f/u with nephrology   Patient reports taking medications as instructed. yes   Medication side effects noted. no  Headache upon wakening. no   Home BP monitoring in range of 349/97'Y systolic. Do you experience chest pain/pressure or SOB with exertion? no  Maintain a low salt diet? yes  Key CAD CHF Meds             rivaroxaban (XARELTO) 20 mg tab tablet (Taking) Take 1 Tab by mouth daily. ezetimibe (ZETIA) 10 mg tablet (Taking) TAKE ONE TABLET BY MOUTH DAILY    isosorbide mononitrate ER (IMDUR) 30 mg tablet (Taking) Take 1 Tab by mouth daily. fenofibrate nanocrystallized (TRICOR) 145 mg tablet (Taking) TAKE ONE TABLET BY MOUTH DAILY    amLODIPine (NORVASC) 10 mg tablet (Taking) Take 0.5 Tabs by mouth daily. nebivoloL (Bystolic) 5 mg tablet (Taking) TAKE ONE TABLET BY MOUTH ONCE DAILY    clopidogreL (PLAVIX) 75 mg tab (Taking) TAKE ONE TABLET BY MOUTH DAILY    aspirin 81 mg chewable tablet (Taking) Take 81 mg by mouth. nitroglycerin (NITROSTAT) 0.4 mg SL tablet (Taking) 0.4 mg by SubLINGual route. Omega-3 Fatty Acids (FISH OIL) 500 mg cap (Taking) Take  by mouth. 2 tablets daily        HLD:  Has been compliant with meds  all of the time  Compliant with low-fat diet. most of the time    Denies myalgias or other side effects. yes  Cholesterol, total   Date Value Ref Range Status   12/10/2020 195 <200 MG/DL Final     Triglyceride   Date Value Ref Range Status   12/10/2020 180 (H) <150 MG/DL Final     Comment:     The drugs N-acetylcysteine (NAC) and  Metamiszole have been found to cause falsely  low results in this chemical assay. Please  be sure to submit blood samples obtained  BEFORE administration of either of these  drugs to assure correct results.        HDL Cholesterol   Date Value Ref Range Status   12/10/2020 33 (L) 40 - 60 MG/DL Final     LDL, calculated   Date Value Ref Range Status   12/10/2020 126 (H) 0 - 100 MG/DL Final   ]  Key Antihyperlipidemia Meds             ezetimibe (ZETIA) 10 mg tablet (Taking) TAKE ONE TABLET BY MOUTH DAILY    fenofibrate nanocrystallized (TRICOR) 145 mg tablet (Taking) TAKE ONE TABLET BY MOUTH DAILY    Omega-3 Fatty Acids (FISH OIL) 500 mg cap (Taking) Take  by mouth. 2 tablets daily         Pulmonary embolism  Pt states cardiologist informed him to stay on xarelto and not to restart plavix  Has not taken as prescribed, ordered 1/24/21 and he still has pills left from the start pack    Prior to Admission medications    Medication Sig Start Date End Date Taking? Authorizing Provider   rivaroxaban (XARELTO) 20 mg tab tablet Take 1 Tab by mouth daily. 4/12/21  Yes Shagufta Alfaro NP   ezetimibe (ZETIA) 10 mg tablet TAKE ONE TABLET BY MOUTH DAILY 4/12/21  Yes Shagufta Alfaro NP   isosorbide mononitrate ER (IMDUR) 30 mg tablet Take 1 Tab by mouth daily. 1/7/21  Yes Shagufta Alfaro NP   fenofibrate nanocrystallized (TRICOR) 145 mg tablet TAKE ONE TABLET BY MOUTH DAILY 1/7/21  Yes Shagufta Alfaro NP   amLODIPine (NORVASC) 10 mg tablet Take 0.5 Tabs by mouth daily. 1/5/21  Yes Shagufta Alfaro NP   nebivoloL (Bystolic) 5 mg tablet TAKE ONE TABLET BY MOUTH ONCE DAILY 1/5/21  Yes Shagufta Alfaro NP   cholecalciferol (Vitamin D3) 25 mcg (1,000 unit) cap Take 2 Caps by mouth every other day. 1/5/21  Yes Shagufta Alfaro NP   clopidogreL (PLAVIX) 75 mg tab TAKE ONE TABLET BY MOUTH DAILY 11/2/20  Yes Shagufta Alfaro NP   aspirin 81 mg chewable tablet Take 81 mg by mouth. 10/2/19  Yes Provider, Historical   nitroglycerin (NITROSTAT) 0.4 mg SL tablet 0.4 mg by SubLINGual route. 9/16/19  Yes Provider, Historical   Omega-3 Fatty Acids (FISH OIL) 500 mg cap Take  by mouth. 2 tablets daily   Yes Provider, Historical   ascorbic acid, vitamin C, (VITAMIN C) 500 mg tablet Take  by mouth. Yes Provider, Historical   rivaroxaban (Xarelto DVT-PE Treat 30d Start) 15 mg (42)- 20 mg (9) DsPk Take one 15 mg tablet twice a day with food for the first 21 days.  Then, take one 20 mg tablet once a day with food for 9 days. 1/24/21 4/12/21  Dotty Cogan, NP   ezetimibe (ZETIA) 10 mg tablet TAKE ONE TABLET BY MOUTH DAILY 1/7/21 4/12/21  Dotty Cogan, NP   varenicline (CHANTIX) 1 mg tablet Take 1 Tab by mouth two (2) times a day. 8/14/20   Dotty Cogan, NP     Allergies   Allergen Reactions    Simvastatin Other (comments)       Patient Active Problem List   Diagnosis Code    Essential hypertension I10    Mixed hyperlipidemia E78.2    Heavy smoker F17.200    Chronic bilateral low back pain with bilateral sciatica M54.42, M54.41, G89.29    Atherosclerosis of coronary artery I25.10    Renal artery stenosis ([facility]) I70.1    CKD (chronic kidney disease) stage 3, GFR 30-59 ml/min ([facility]) N18.30    Coronary artery disease with stable angina pectoris ([facility]) I25.118    Multiple subsegmental pulmonary emboli without acute cor pulmonale ([facility]) I26.94    Bilateral calf pain M79.661, M79.662    Bilateral leg numbness R20.0    Abdominal aortic aneurysm (AAA) without rupture ([facility]) I71.4     Past Surgical History:   Procedure Laterality Date    AL CARDIAC SURG PROCEDURE UNLIST      stent      Family History   Problem Relation Age of Onset    Diabetes Mother     Heart Disease Mother     Stroke Father     Diabetes Maternal Grandmother      Social History     Tobacco Use    Smoking status: Current Every Day Smoker     Packs/day: 1.00     Types: Cigarettes    Smokeless tobacco: Never Used   Substance Use Topics    Alcohol use: No       ROS  As stated in HPI, otherwise all others negative. Objective: There were no vitals taken for this visit.    General: alert, cooperative, no distress   Mental  status: normal mood, behavior, speech, dress, motor activity, and thought processes, able to follow commands   HENT: NCAT   Neck: no visualized mass   Resp: no respiratory distress   Neuro: no gross deficits   Skin: no discoloration or lesions of concern on visible areas Psychiatric: normal affect, consistent with stated mood, no evidence of hallucinations     Additional exam findings: We discussed the expected course, resolution and complications of the diagnosis(es) in detail. Medication risks, benefits, costs, interactions, and alternatives were discussed as indicated. I advised him to contact the office if his condition worsens, changes or fails to improve as anticipated. He expressed understanding with the diagnosis(es) and plan. Meseret Antunez is a 78 y.o. male who was evaluated by a video visit encounter for concerns as above. Patient identification was verified prior to start of the visit. A caregiver was present when appropriate. Due to this being a TeleHealth encounter ([facility]), evaluation of the following organ systems was limited: Vitals/Constitutional/EENT/Resp/CV/GI//MS/Neuro/Skin/Heme-Lymph-Imm. Pursuant to the emergency declaration under the [address] [place] [address] waiver authority and the [employer] and [employer] Act, this Virtual  Visit was conducted, with patient's (and/or legal guardian's) consent, to reduce the patient's risk of exposure to COVID-19 and provide necessary medical care. Services were provided through a video synchronous discussion virtually to substitute for in-person clinic visit. Patient and provider were located at their individual homes. An After Visit Summary was printed and given to the patient. All diagnosis have been discussed with the patient and all of the patient's questions have been answered. Follow-up and Dispositions    · Return in about 4 months (around 8/12/2021) for MAWV, HLD, HTN, 30 min, VV. Tanya Ley, [facility]   [facility], [place] [address]

## 2021-04-12 NOTE — PROGRESS NOTES
Chief Complaint   Patient presents with    Annual Wellness Visit       Lolita Lepe presents today for   Chief Complaint   Patient presents with    Annual Wellness Visit       Is someone accompanying this pt? No    Is the patient using any DME equipment during OV? No    Depression Screening:  3 most recent PHQ Screens 4/12/2021   Little interest or pleasure in doing things Not at all   Feeling down, depressed, irritable, or hopeless Not at all   Total Score PHQ 2 0       Learning Assessment:  No flowsheet data found. Fall Risk  Fall Risk Assessment, last 12 mths 1/12/2021   Able to walk? Yes   Fall in past 12 months? 0   Do you feel unsteady? 0   Are you worried about falling 0       ADL  No flowsheet data found. Travel Screening:    Travel Screening     Question   Response    In the last month, have you been in contact with someone who was confirmed or suspected to have Coronavirus / COVID-19? No / Unsure    Have you had a COVID-19 viral test in the last 14 days? No    Do you have any of the following new or worsening symptoms? None of these    Have you traveled internationally or domestically in the last month? No      Travel History   Travel since 03/12/21     No documented travel since 03/12/21          Health Maintenance reviewed and discussed and ordered per Provider. Health Maintenance Due   Topic Date Due    COVID-19 Vaccine (1) Never done    Shingrix Vaccine Age 50> (1 of 2) Never done    Colorectal Cancer Screening Combo  05/30/2018    Pneumococcal 65+ years (2 of 2 - PPSV23) 02/08/2020   . Coordination of Care:  1. Have you been to the ER, urgent care clinic since your last visit? Hospitalized since your last visit? No    2. Have you seen or consulted any other health care providers outside of the [address] [place] [address] since your last visit? Include any pap smears or colon screening.  Yes, cardiologist at [facility] on 3/26/2021

## 2021-07-07 DIAGNOSIS — I10 ESSENTIAL HYPERTENSION: ICD-10-CM

## 2021-07-07 DIAGNOSIS — E78.2 MIXED HYPERLIPIDEMIA: ICD-10-CM

## 2021-07-07 RX ORDER — ISOSORBIDE MONONITRATE 30 MG/1
TABLET, EXTENDED RELEASE ORAL
Qty: 90 TABLET | Refills: 0 | Status: SHIPPED | OUTPATIENT
Start: 2021-07-07 | End: 2021-12-06

## 2021-07-07 RX ORDER — CLOPIDOGREL BISULFATE 75 MG/1
TABLET ORAL
Qty: 90 TABLET | Refills: 2 | Status: SHIPPED | OUTPATIENT
Start: 2021-07-07 | End: 2022-01-18 | Stop reason: SDUPTHER

## 2021-07-07 RX ORDER — FENOFIBRATE 145 MG/1
TABLET, COATED ORAL
Qty: 90 TABLET | Refills: 1 | Status: SHIPPED | OUTPATIENT
Start: 2021-07-07 | End: 2022-01-03

## 2021-08-13 ENCOUNTER — VIRTUAL VISIT (OUTPATIENT)
Dept: FAMILY MEDICINE CLINIC | Age: 66
End: 2021-08-13
Payer: MEDICARE

## 2021-08-13 DIAGNOSIS — Z87.891 PERSONAL HISTORY OF TOBACCO USE, PRESENTING HAZARDS TO HEALTH: ICD-10-CM

## 2021-08-13 DIAGNOSIS — I25.118 CORONARY ARTERY DISEASE OF NATIVE HEART WITH STABLE ANGINA PECTORIS, UNSPECIFIED VESSEL OR LESION TYPE (HCC): ICD-10-CM

## 2021-08-13 DIAGNOSIS — I10 ESSENTIAL HYPERTENSION: ICD-10-CM

## 2021-08-13 DIAGNOSIS — Z12.11 COLON CANCER SCREENING: ICD-10-CM

## 2021-08-13 DIAGNOSIS — E78.2 MIXED HYPERLIPIDEMIA: ICD-10-CM

## 2021-08-13 DIAGNOSIS — Z00.00 MEDICARE ANNUAL WELLNESS VISIT, SUBSEQUENT: Primary | ICD-10-CM

## 2021-08-13 DIAGNOSIS — H91.93 DECREASED HEARING OF BOTH EARS: ICD-10-CM

## 2021-08-13 PROCEDURE — G8427 DOCREV CUR MEDS BY ELIG CLIN: HCPCS | Performed by: NURSE PRACTITIONER

## 2021-08-13 PROCEDURE — 1101F PT FALLS ASSESS-DOCD LE1/YR: CPT | Performed by: NURSE PRACTITIONER

## 2021-08-13 PROCEDURE — 3017F COLORECTAL CA SCREEN DOC REV: CPT | Performed by: NURSE PRACTITIONER

## 2021-08-13 PROCEDURE — G8756 NO BP MEASURE DOC: HCPCS | Performed by: NURSE PRACTITIONER

## 2021-08-13 PROCEDURE — G8536 NO DOC ELDER MAL SCRN: HCPCS | Performed by: NURSE PRACTITIONER

## 2021-08-13 PROCEDURE — G8417 CALC BMI ABV UP PARAM F/U: HCPCS | Performed by: NURSE PRACTITIONER

## 2021-08-13 PROCEDURE — 99214 OFFICE O/P EST MOD 30 MIN: CPT | Performed by: NURSE PRACTITIONER

## 2021-08-13 PROCEDURE — G8432 DEP SCR NOT DOC, RNG: HCPCS | Performed by: NURSE PRACTITIONER

## 2021-08-13 PROCEDURE — G0439 PPPS, SUBSEQ VISIT: HCPCS | Performed by: NURSE PRACTITIONER

## 2021-08-13 NOTE — PROGRESS NOTES
Please use this number [phone]     1. Have you been to the ER, urgent care clinic since your last visit? Hospitalized since your last visit? Yes Nicole Mackenzie and [facility] for Blood in urine     2. Have you seen or consulted any other health care providers outside of the [address] [place] [address] since your last visit? Include any pap smears or colon screening.  Yes [facility]     Health Maintenance Due   Topic Date Due    COVID-19 Vaccine (1) Never done    Shingrix Vaccine Age 50> (1 of 2) Never done    Low dose CT lung screening  Never done    Colorectal Cancer Screening Combo  05/30/2018    Pneumococcal 65+ years (2 of 2 - PPSV23) 02/08/2020    Medicare Yearly Exam  08/15/2021

## 2021-08-13 NOTE — PROGRESS NOTES
Christen               Tulio Amaya               [phone]      Rudy Mello is a 78 y.o. male who was seen by synchronous (real-time) audio-video technology on 8/13/2021. Consent: Rudy Mello, who was seen by synchronous (real-time) audio-video technology, and/or his healthcare decision maker, is aware that this patient-initiated, Telehealth encounter on 8/13/2021 is a billable service, with coverage as determined by his insurance carrier. He is aware that he may receive a bill and has provided verbal consent to proceed: Yes. Assessment & Plan:     Diagnoses and all orders for this visit:    1. Medicare annual wellness visit, subsequent  Completed today to include ETOH and depression screening  2. Essential hypertension  Endorses medication compliance and Blood pressure stable, continue same medications   3. Mixed hyperlipidemia  Endorses medication compliance and Denies abdominal pain or symptoms of myalgia   4. Personal history of tobacco use, presenting hazards to health  -     CT LOW DOSE LUNG CANCER SCREENING; Future  Health maintenance needs addressed   5. Colon cancer screening  -     REFERRAL TO GASTROENTEROLOGY  Health maintenance needs addressed   6. Coronary artery disease of native heart with stable angina pectoris, unspecified vessel or lesion type ([address]  stable, managed by cardiology   7. Decreased hearing of both ears  -     REFERRAL TO AUDIOLOGY  Health maintenance needs addressed     Follow-up and Dispositions    · Return in about 3 months (around 11/13/2021) for HLD, HTN, 30 min, office only.              712  Subjective:     Health Maintenance Due   Topic Date Due    COVID-19 Vaccine (1) Never done    Shingrix Vaccine Age 50> (1 of 2) Never done    Low dose CT lung screening  Never done    Colorectal Cancer Screening Combo  05/30/2018    Pneumococcal 65+ years (2 of 2 - PPSV23) 02/08/2020    Medicare Yearly Exam  08/15/2021             Rudy Mello is a 78 y.o. male who was seen for   Follow Up Chronic Condition, Hypertension, and Cholesterol Problem    Hypertension:   Patient reports taking medications as instructed. yes   Medication side effects noted. no  Headache upon wakening. no   Home BP monitoring in range of does not check's systolic. Do you experience chest pain/pressure or SOB with exertion? yes, he has spoken with cardiology about this  Maintain a low salt diet? yes  Key CAD CHF Meds             omega 3-DHA-EPA-fish oil 1,000 mg (120 mg-180 mg) capsule (Taking) Take 1 Tablet by mouth daily. isosorbide mononitrate ER (IMDUR) 30 mg tablet (Taking) TAKE ONE TABLET BY MOUTH DAILY    clopidogreL (PLAVIX) 75 mg tab (Taking) TAKE ONE TABLET BY MOUTH DAILY    fenofibrate nanocrystallized (TRICOR) 145 mg tablet (Taking) TAKE ONE TABLET BY MOUTH DAILY    nebivoloL (Bystolic) 5 mg tablet (Taking) TAKE ONE TABLET BY MOUTH DAILY    ezetimibe (ZETIA) 10 mg tablet (Taking) TAKE ONE TABLET BY MOUTH DAILY    amLODIPine (NORVASC) 10 mg tablet (Taking) Take 0.5 Tabs by mouth daily. aspirin 81 mg chewable tablet (Taking) Take 81 mg by mouth. nitroglycerin (NITROSTAT) 0.4 mg SL tablet (Taking) 0.4 mg by SubLINGual route. Omega-3 Fatty Acids (FISH OIL) 500 mg cap (Taking) Take  by mouth. 2 tablets daily    rivaroxaban (XARELTO) 20 mg tab tablet Take 1 Tab by mouth daily. HLD:  Has been compliant with meds  all of the time  Compliant with low-fat diet. most of the time    Denies myalgias or other side effects. yes  Cholesterol, total   Date Value Ref Range Status   12/10/2020 195 <200 MG/DL Final     Triglyceride   Date Value Ref Range Status   12/10/2020 180 (H) <150 MG/DL Final     Comment:     The drugs N-acetylcysteine (NAC) and  Metamiszole have been found to cause falsely  low results in this chemical assay. Please  be sure to submit blood samples obtained  BEFORE administration of either of these  drugs to assure correct results.        HDL Cholesterol   Date Value Ref Range Status   12/10/2020 33 (L) 40 - 60 MG/DL Final     LDL, calculated   Date Value Ref Range Status   12/10/2020 126 (H) 0 - 100 MG/DL Final   ]  Key Antihyperlipidemia Meds             omega 3-DHA-EPA-fish oil 1,000 mg (120 mg-180 mg) capsule (Taking) Take 1 Tablet by mouth daily. fenofibrate nanocrystallized (TRICOR) 145 mg tablet (Taking) TAKE ONE TABLET BY MOUTH DAILY    ezetimibe (ZETIA) 10 mg tablet (Taking) TAKE ONE TABLET BY MOUTH DAILY    Omega-3 Fatty Acids (FISH OIL) 500 mg cap (Taking) Take  by mouth. 2 tablets daily           Prior to Admission medications    Medication Sig Start Date End Date Taking? Authorizing Provider   omega 3-DHA-EPA-fish oil 1,000 mg (120 mg-180 mg) capsule Take 1 Tablet by mouth daily. Yes Provider, Historical   dexAMETHasone (DECADRON) 0.5 mg tablet Take 2 tablets by mouth once for 1 dose. 7/8/21  Yes MICHAELA Orellana   isosorbide mononitrate ER (IMDUR) 30 mg tablet TAKE ONE TABLET BY MOUTH DAILY 7/7/21  Yes Radha Bowman NP   clopidogreL (PLAVIX) 75 mg tab TAKE ONE TABLET BY MOUTH DAILY 7/7/21  Yes Radha Bowman NP   fenofibrate nanocrystallized (TRICOR) 145 mg tablet TAKE ONE TABLET BY MOUTH DAILY 7/7/21  Yes Radha Bowman NP   nebivoloL (Bystolic) 5 mg tablet TAKE ONE TABLET BY MOUTH DAILY 5/28/21  Yes Radha Bowman NP   ezetimibe (ZETIA) 10 mg tablet TAKE ONE TABLET BY MOUTH DAILY 4/12/21  Yes Radha Bowman NP   amLODIPine (NORVASC) 10 mg tablet Take 0.5 Tabs by mouth daily. 1/5/21  Yes Radha Bowman NP   cholecalciferol (Vitamin D3) 25 mcg (1,000 unit) cap Take 2 Caps by mouth every other day. 1/5/21  Yes Radha Bowman NP   aspirin 81 mg chewable tablet Take 81 mg by mouth. 10/2/19  Yes Provider, Historical   nitroglycerin (NITROSTAT) 0.4 mg SL tablet 0.4 mg by SubLINGual route. 9/16/19  Yes Provider, Historical   Omega-3 Fatty Acids (FISH OIL) 500 mg cap Take  by mouth.  2 tablets daily   Yes Provider, Historical   ascorbic acid, vitamin C, (VITAMIN C) 500 mg tablet Take  by mouth. Yes Provider, Historical   rivaroxaban (XARELTO) 20 mg tab tablet Take 1 Tab by mouth daily. Patient not taking: Reported on 8/13/2021 4/12/21   Rey Plasencia NP     Allergies   Allergen Reactions    Simvastatin Other (comments)       Patient Active Problem List   Diagnosis Code    Essential hypertension I10    Mixed hyperlipidemia E78.2    Heavy smoker F17.200    Chronic bilateral low back pain with bilateral sciatica M54.42, M54.41, G89.29    Atherosclerosis of coronary artery I25.10    Renal artery stenosis ([facility]) I70.1    CKD (chronic kidney disease) stage 3, GFR 30-59 ml/min ([facility]) N18.30    Coronary artery disease with stable angina pectoris ([facility]) I25.118    Multiple subsegmental pulmonary emboli without acute cor pulmonale ([facility]) I26.94    Bilateral calf pain M79.661, M79.662    Bilateral leg numbness R20.0    Abdominal aortic aneurysm (AAA) without rupture ([facility]) I71.4     Past Surgical History:   Procedure Laterality Date    [place] CARDIAC SURG PROCEDURE UNLIST      stent      Family History   Problem Relation Age of Onset    Diabetes Mother     Heart Disease Mother     Stroke Father     Dementia Father     Diabetes Maternal Grandmother     Diabetes Sister      Social History     Tobacco Use    Smoking status: Current Every Day Smoker     Packs/day: 1.00     Years: 42.00     Pack years: 42.00     Types: Cigarettes    Smokeless tobacco: Never Used   Substance Use Topics    Alcohol use: Yes     Alcohol/week: 2.0 standard drinks     Types: 2 Standard drinks or equivalent per week       ROS  As stated in HPI, otherwise all others negative. Objective: There were no vitals taken for this visit.    General: alert, cooperative, no distress   Mental  status: normal mood, behavior, speech, dress, motor activity, and thought processes, able to follow commands   HENT: NCAT   Neck: no visualized mass   Resp: no respiratory distress   Neuro: no gross deficits   Skin: no discoloration or lesions of concern on visible areas   Psychiatric: normal affect, consistent with stated mood, no evidence of hallucinations     Additional exam findings: We discussed the expected course, resolution and complications of the diagnosis(es) in detail. Medication risks, benefits, costs, interactions, and alternatives were discussed as indicated. I advised him to contact the office if his condition worsens, changes or fails to improve as anticipated. He expressed understanding with the diagnosis(es) and plan. Darrel Ramirez is a 78 y.o. male who was evaluated by a video visit encounter for concerns as above. Patient identification was verified prior to start of the visit. A caregiver was present when appropriate. Due to this being a TeleHealth encounter ([facility] public health emergency), evaluation of the following organ systems was limited: Vitals/Constitutional/EENT/Resp/CV/GI//MS/Neuro/Skin/Heme-Lymph-Imm. Pursuant to the emergency declaration under the [facility], [facility] waiver authority and the [employer] Act, this Virtual  Visit was conducted, with patient's (and/or legal guardian's) consent, to reduce the patient's risk of exposure to COVID-19 and provide necessary medical care. Services were provided through a video synchronous discussion virtually to substitute for in-person clinic visit. Patient and provider were located at their individual homes. An After Visit Summary was printed and given to the patient. All diagnosis have been discussed with the patient and all of the patient's questions have been answered. Follow-up and Dispositions    · Return in about 3 months (around 11/13/2021) for HLD, HTN, 30 min, office only.          Madina Reed, ADRIA-BC  [facility]  [address] [facility], [address]    This is the Subsequent Medicare Annual Wellness Exam, performed 12 months or more after the Initial AWV or the last Subsequent AWV    I have reviewed the patient's medical history in detail and updated the computerized patient record. Assessment/Plan   Education and counseling provided:  Are appropriate based on today's review and evaluation    1. Medicare annual wellness visit, subsequent  2. Essential hypertension  3. Mixed hyperlipidemia  4. Personal history of tobacco use, presenting hazards to health  -     CT LOW DOSE LUNG CANCER SCREENING; Future  5. Colon cancer screening  -     REFERRAL TO GASTROENTEROLOGY  6. Coronary artery disease of native heart with stable angina pectoris, unspecified vessel or lesion type ([address]  7. Decreased hearing of both ears  -     REFERRAL TO AUDIOLOGY       Depression Risk Factor Screening:     3 most recent PHQ Screens 8/13/2021   Little interest or pleasure in doing things Not at all   Feeling down, depressed, irritable, or hopeless Not at all   Total Score PHQ 2 0       Alcohol Risk Screen    Do you average more than 1 drink per night or more than 7 drinks a week: No    In the past three months have you have had more than 4 drinks containing alcohol on one occasion: No        Functional Ability and Level of Safety:    Hearing: The patient needs further evaluation. Activities of Daily Living: The home contains: no safety equipment. Patient does total self care      Ambulation: with no difficulty     Fall Risk:  Fall Risk Assessment, last 12 mths 1/12/2021   Able to walk? Yes   Fall in past 12 months? 0   Do you feel unsteady?  0   Are you worried about falling 0      Abuse Screen:  Patient is not abused       Cognitive Screening    Has your family/caregiver stated any concerns about your memory: no    Cognitive Screening: Normal - three word recall: 3/3    Health Maintenance Due     Health Maintenance Due Topic Date Due    COVID-19 Vaccine (1) Never done    Shingrix Vaccine Age 50> (1 of 2) Never done    Low dose CT lung screening  Never done    Colorectal Cancer Screening Combo  05/30/2018    Pneumococcal 65+ years (2 of 2 - PPSV23) 02/08/2020    Medicare Yearly Exam  08/15/2021       Patient Care Team   Patient Care Team:  Stephanie Lee NP as PCP - General (Nurse Practitioner)  Stephanie Lee [facility] as PCP - [facility]  Idania Weldon MD (Nephrology)    History     Patient Active Problem List   Diagnosis Code    Essential hypertension I10    Mixed hyperlipidemia E78.2    Heavy smoker F17.200    Chronic bilateral low back pain with bilateral sciatica M54.42, M54.41, G89.29    Atherosclerosis of coronary artery I25.10    Renal artery stenosis (HCC) I70.1    CKD (chronic kidney disease) stage 3, GFR 30-59 ml/min ([facility]) N18.30    Coronary artery disease with stable angina pectoris ([facility] [address] I25.118    Multiple subsegmental pulmonary emboli without acute cor pulmonale ([facility]) I26.94    Bilateral calf pain M79.661, M79.662    Bilateral leg numbness R20.0    Abdominal aortic aneurysm (AAA) without rupture ([facility] [address] I71.4     Past Medical History:   Diagnosis Date    Erectile dysfunction     Hypercholesterolemia     Hypertension       Past Surgical History:   Procedure Laterality Date    [place] CARDIAC SURG PROCEDURE UNLIST      stent      Current Outpatient Medications   Medication Sig Dispense Refill    omega 3-DHA-EPA-fish oil 1,000 mg (120 mg-180 mg) capsule Take 1 Tablet by mouth daily.  dexAMETHasone (DECADRON) 0.5 mg tablet Take 2 tablets by mouth once for 1 dose.  2 Tablet 0    isosorbide mononitrate ER (IMDUR) 30 mg tablet TAKE ONE TABLET BY MOUTH DAILY 90 Tablet 0    clopidogreL (PLAVIX) 75 mg tab TAKE ONE TABLET BY MOUTH DAILY 90 Tablet 2    fenofibrate nanocrystallized (TRICOR) 145 mg tablet TAKE ONE TABLET BY MOUTH DAILY 90 Tablet 1    nebivoloL (Bystolic) 5 mg tablet TAKE ONE TABLET BY MOUTH DAILY 90 Tablet 3    ezetimibe (ZETIA) 10 mg tablet TAKE ONE TABLET BY MOUTH DAILY 90 Tab 1    amLODIPine (NORVASC) 10 mg tablet Take 0.5 Tabs by mouth daily. 90 Tab 0    cholecalciferol (Vitamin D3) 25 mcg (1,000 unit) cap Take 2 Caps by mouth every other day. 90 Cap 0    aspirin 81 mg chewable tablet Take 81 mg by mouth.  nitroglycerin (NITROSTAT) 0.4 mg SL tablet 0.4 mg by SubLINGual route.  Omega-3 Fatty Acids (FISH OIL) 500 mg cap Take  by mouth. 2 tablets daily      ascorbic acid, vitamin C, (VITAMIN C) 500 mg tablet Take  by mouth.  rivaroxaban (XARELTO) 20 mg tab tablet Take 1 Tab by mouth daily. (Patient not taking: Reported on 8/13/2021) 90 Tab 1     Allergies   Allergen Reactions    Simvastatin Other (comments)       Family History   Problem Relation Age of Onset    Diabetes Mother     Heart Disease Mother     Stroke Father     Dementia Father     Diabetes Maternal Grandmother     Diabetes Sister      Social History     Tobacco Use    Smoking status: Current Every Day Smoker     Packs/day: 1.00     Years: 42.00     Pack years: 42.00     Types: Cigarettes    Smokeless tobacco: Never Used   Substance Use Topics    Alcohol use: Yes     Alcohol/week: 2.0 standard drinks     Types: 2 Standard drinks or equivalent per week       Sascha Matthews, who was evaluated through a synchronous (real-time) audio-video encounter, and/or his healthcare decision maker, is aware that it is a billable service, with coverage as determined by his insurance carrier. He provided verbal consent to proceed: Yes, and patient identification was verified. It was conducted pursuant to the emergency declaration under the [facility], [address] [place] [address] authority and the [employer] Act. A caregiver was present when appropriate. Ability to conduct physical exam was limited. I was in the office.  The patient was at home. Kimberly Gonzalez NP    Discussed with patient current guidelines for screening for lung cancer. Current recommendations are to obtain yearly screening LDCT yearly for age 46-80, or until smoke free for 15 years. Patient has 56 pack year history of cigarette smoking and currently is smoking. Discussed with patient risks and* p benefits of screening, including over-diagnosis, false positive rate, and total radiation exposure. Patient currently exhibits no signs or symptoms suggestive of lung cancer. Discussed with patient importance of compliance with yearly annual lung cancer screenings and willingness to undergo diagnosis and treatment if screening scan is positive. In addition, patient was counseled regarding (remaining smoke free/total smoking cessation).

## 2021-08-13 NOTE — PATIENT INSTRUCTIONS
Medicare Wellness Visit, Male    The best way to live healthy is to have a lifestyle where you eat a well-balanced diet, exercise regularly, limit alcohol use, and quit all forms of tobacco/nicotine, if applicable. Regular preventive services are another way to keep healthy. Preventive services (vaccines, screening tests, monitoring & exams) can help personalize your care plan, which helps you manage your own care. Screening tests can find health problems at the earliest stages, when they are easiest to treat. [facility] follows the current, evidence-based guidelines published by the [facility] ([facility]) when recommending preventive services for our patients. Because we follow these guidelines, sometimes recommendations change over time as research supports it. (For example, a prostate screening blood test is no longer routinely recommended for men with no symptoms). Of course, you and your doctor may decide to screen more often for some diseases, based on your risk and co-morbidities (chronic disease you are already diagnosed with). Preventive services for you include:  - Medicare offers their members a free annual wellness visit, which is time for you and your primary care provider to discuss and plan for your preventive service needs. Take advantage of this benefit every year!  -All adults over age 72 should receive the recommended pneumonia vaccines. Current USPSTF guidelines recommend a series of two vaccines for the best pneumonia protection.   -All adults should have a flu vaccine yearly and tetanus vaccine every 10 years.  -All adults age 48 and older should receive the shingles vaccines (series of two vaccines).        -All adults age 38-68 who are overweight should have a diabetes screening test once every three years.   -Other screening tests & preventive services for persons with diabetes include: an eye exam to screen for diabetic retinopathy, a kidney function test, a foot exam, and stricter control over your cholesterol.   -Cardiovascular screening for adults with routine risk involves an electrocardiogram (ECG) at intervals determined by the provider.   -Colorectal cancer screening should be done for adults age 54-65 with no increased risk factors for colorectal cancer. There are a number of acceptable methods of screening for this type of cancer. Each test has its own benefits and drawbacks. Discuss with your provider what is most appropriate for you during your annual wellness visit. The different tests include: colonoscopy (considered the best screening method), a fecal occult blood test, a fecal DNA test, and sigmoidoscopy.  -All adults born between [facility] should be screened once for Hepatitis C.  -An Abdominal Aortic Aneurysm (AAA) Screening is recommended for men age 73-68 who has ever smoked in their lifetime. Here is a list of your current Health Maintenance items (your personalized list of preventive services) with a due date:  Health Maintenance Due   Topic Date Due    COVID-19 Vaccine (1) Never done    Shingles Vaccine (1 of 2) Never done    Smoker or Former Smoker - [address]  Never done    Colorectal Screening  05/30/2018    Pneumococcal Vaccine (2 of 2 - PPSV23) 02/08/2020     Medicare Wellness Visit, Male    The best way to live healthy is to have a lifestyle where you eat a well-balanced diet, exercise regularly, limit alcohol use, and quit all forms of tobacco/nicotine, if applicable. Regular preventive services are another way to keep healthy. Preventive services (vaccines, screening tests, monitoring & exams) can help personalize your care plan, which helps you manage your own care. Screening tests can find health problems at the earliest stages, when they are easiest to treat.    [facility] follows the current, evidence-based guidelines published by the [facility] Preventive Services Task Force (USPSTF) when recommending preventive services for our patients. Because we follow these guidelines, sometimes recommendations change over time as research supports it. (For example, a prostate screening blood test is no longer routinely recommended for men with no symptoms). Of course, you and your doctor may decide to screen more often for some diseases, based on your risk and co-morbidities (chronic disease you are already diagnosed with). Preventive services for you include:  - Medicare offers their members a free annual wellness visit, which is time for you and your primary care provider to discuss and plan for your preventive service needs. Take advantage of this benefit every year!  -All adults over age 72 should receive the recommended pneumonia vaccines. Current USPSTF guidelines recommend a series of two vaccines for the best pneumonia protection.   -All adults should have a flu vaccine yearly and tetanus vaccine every 10 years.  -All adults age 48 and older should receive the shingles vaccines (series of two vaccines). -All adults age 38-68 who are overweight should have a diabetes screening test once every three years.   -Other screening tests & preventive services for persons with diabetes include: an eye exam to screen for diabetic retinopathy, a kidney function test, a foot exam, and stricter control over your cholesterol.   -Cardiovascular screening for adults with routine risk involves an electrocardiogram (ECG) at intervals determined by the provider.   -Colorectal cancer screening should be done for adults age 54-65 with no increased risk factors for colorectal cancer. There are a number of acceptable methods of screening for this type of cancer. Each test has its own benefits and drawbacks. Discuss with your provider what is most appropriate for you during your annual wellness visit.  The different tests include: colonoscopy (considered the best screening method), a fecal occult blood test, a fecal DNA test, and sigmoidoscopy.  -All adults born between [facility] should be screened once for Hepatitis C.  -An Abdominal Aortic Aneurysm (AAA) Screening is recommended for men age 73-68 who has ever smoked in their lifetime.      Here is a list of your current Health Maintenance items (your personalized list of preventive services) with a due date:  Health Maintenance Due   Topic Date Due    COVID-19 Vaccine (1) Never done    Shingles Vaccine (1 of 2) Never done    Smoker or Former Smoker - [address]  Never done    Colorectal Screening  05/30/2018    Pneumococcal Vaccine (2 of 2 - PPSV23) 02/08/2020    Annual Well Visit  08/15/2021

## 2021-10-03 DIAGNOSIS — I10 ESSENTIAL HYPERTENSION: ICD-10-CM

## 2021-10-03 RX ORDER — AMLODIPINE BESYLATE 10 MG/1
TABLET ORAL
Qty: 45 TABLET | Refills: 1 | Status: SHIPPED | OUTPATIENT
Start: 2021-10-03 | End: 2022-01-18 | Stop reason: SDUPTHER

## 2021-11-11 ENCOUNTER — OFFICE VISIT (OUTPATIENT)
Dept: FAMILY MEDICINE CLINIC | Age: 66
End: 2021-11-11
Payer: MEDICARE

## 2021-11-11 DIAGNOSIS — I10 ESSENTIAL HYPERTENSION: Primary | ICD-10-CM

## 2021-11-11 DIAGNOSIS — Z23 ENCOUNTER FOR IMMUNIZATION: ICD-10-CM

## 2021-11-11 DIAGNOSIS — E78.2 MIXED HYPERLIPIDEMIA: ICD-10-CM

## 2021-11-11 PROBLEM — I26.94 MULTIPLE SUBSEGMENTAL PULMONARY EMBOLI WITHOUT ACUTE COR PULMONALE (HCC): Status: RESOLVED | Noted: 2020-08-14 | Resolved: 2021-11-11

## 2021-11-11 PROCEDURE — 99214 OFFICE O/P EST MOD 30 MIN: CPT | Performed by: NURSE PRACTITIONER

## 2021-11-11 PROCEDURE — G8754 DIAS BP LESS 90: HCPCS | Performed by: NURSE PRACTITIONER

## 2021-11-11 PROCEDURE — G8417 CALC BMI ABV UP PARAM F/U: HCPCS | Performed by: NURSE PRACTITIONER

## 2021-11-11 PROCEDURE — 90694 VACC AIIV4 NO PRSRV 0.5ML IM: CPT | Performed by: NURSE PRACTITIONER

## 2021-11-11 PROCEDURE — G0008 ADMIN INFLUENZA VIRUS VAC: HCPCS | Performed by: NURSE PRACTITIONER

## 2021-11-11 PROCEDURE — G8753 SYS BP > OR = 140: HCPCS | Performed by: NURSE PRACTITIONER

## 2021-11-11 PROCEDURE — G8427 DOCREV CUR MEDS BY ELIG CLIN: HCPCS | Performed by: NURSE PRACTITIONER

## 2021-11-11 PROCEDURE — 3017F COLORECTAL CA SCREEN DOC REV: CPT | Performed by: NURSE PRACTITIONER

## 2021-11-11 PROCEDURE — G8432 DEP SCR NOT DOC, RNG: HCPCS | Performed by: NURSE PRACTITIONER

## 2021-11-11 PROCEDURE — 1101F PT FALLS ASSESS-DOCD LE1/YR: CPT | Performed by: NURSE PRACTITIONER

## 2021-11-11 PROCEDURE — G8536 NO DOC ELDER MAL SCRN: HCPCS | Performed by: NURSE PRACTITIONER

## 2021-11-11 NOTE — PATIENT INSTRUCTIONS
Call [facility] Scheduling at [phone] to schedule your:  Low dose Lung CT for lung cancer screening    Call [address] at [phone] schedule your colonoscopy     Call [facility] at [phone] for hearing screening    Stop Xarelto, continue Plavix.

## 2021-11-11 NOTE — PROGRESS NOTES
Room 7    Did patient bring someone? No    Did the patient have DME equipment? No    Did you take your medication today? Yes       1. Have you been to the ER, urgent care clinic since your last visit? Hospitalized since your last visit? No    2. Have you seen or consulted any other health care providers outside of the [address] [place] [address] since your last visit? Include any pap smears or colon screening. Yes       Health Maintenance Due   Topic Date Due    COVID-19 Vaccine (1) Never done    Shingrix Vaccine Age 50> (1 of 2) Never done    Low dose CT lung screening  Never done    Colorectal Cancer Screening Combo  05/30/2018    Pneumococcal 65+ years (2 of 2 - PPSV23) 02/08/2020    Flu Vaccine (1) 09/01/2021     Patient was given VIS for review, consent was obtained and per orders of LIZETT. Maddy Gonzalez , injection of Fluad given by Michelle Hudson. Patient observed. No signs nor symptoms of any adverse reactions. Patient tolerated injection well.

## 2021-11-11 NOTE — PROGRESS NOTES
[address] [place] [address]               [phone]      Sharda Zelaya is a 78 y.o. male and presents with Follow Up Chronic Condition, Hypertension, and Cholesterol Problem       Assessment/Plan:    Diagnoses and all orders for this visit:    1. Essential hypertension  -     METABOLIC PANEL, COMPREHENSIVE; Future  Endorses medication compliance, Follow-up labs today and Blood pressure stable, continue same medications   2. Mixed hyperlipidemia  -     LIPID PANEL; Future  Endorses medication compliance, Follow-up labs today and Denies abdominal pain or symptoms of myalgia     Received pneumococcal 23 today  Gave him all the numbers for tests ordered at last visit so he can call and make appointment. Patient verbalized understanding and is in agreement with this plan of care       Follow-up and Dispositions    · Return in about 3 months (around 2/11/2022) for HTN, HLD, 15 min, office only. Health Maintenance:   Health Maintenance   Topic Date Due    COVID-19 Vaccine (1) Never done    Low dose CT lung screening  Never done    Colorectal Cancer Screening Combo  05/30/2018    Pneumococcal 65+ years (2 of 2 - PPSV23) 02/08/2020    Shingrix Vaccine Age 50> (2 of 2) 12/14/2021    Medicare Yearly Exam  08/14/2022    DTaP/Tdap/Td series (2 - Td or Tdap) 02/12/2023    Lipid Screen  12/10/2025    Hepatitis C Screening  Completed    AAA Screening 73-67 YO Male Smoking Patients  Completed    Flu Vaccine  Completed        Subjective:    Hypertension:   Patient reports taking medications as instructed. yes   Medication side effects noted. no  Headache upon wakening. no   Home BP monitoring in range of does not check. Do you experience chest pain/pressure or SOB with exertion? no  Maintain a low Sodium diet?  yes  Key CAD CHF Meds             amLODIPine (NORVASC) 10 mg tablet (Taking) TAKE 1/2 TABLET BY MOUTH DAILY    omega 3-DHA-EPA-fish oil 1,000 mg (120 mg-180 mg) capsule (Taking) Take 1 Tablet by mouth daily. isosorbide mononitrate ER (IMDUR) 30 mg tablet (Taking) TAKE ONE TABLET BY MOUTH DAILY    clopidogreL (PLAVIX) 75 mg tab (Taking) TAKE ONE TABLET BY MOUTH DAILY    fenofibrate nanocrystallized (TRICOR) 145 mg tablet (Taking) TAKE ONE TABLET BY MOUTH DAILY    nebivoloL (Bystolic) 5 mg tablet (Taking) TAKE ONE TABLET BY MOUTH DAILY    ezetimibe (ZETIA) 10 mg tablet (Taking) TAKE ONE TABLET BY MOUTH DAILY    aspirin 81 mg chewable tablet (Taking) Take 81 mg by mouth. nitroglycerin (NITROSTAT) 0.4 mg SL tablet (Taking) 0.4 mg by SubLINGual route. Omega-3 Fatty Acids (FISH OIL) 500 mg cap Take  by mouth. 2 tablets daily        BUN   Date Value Ref Range Status   01/12/2021 29 (H) 8 - 27 mg/dL Final     Creatinine   Date Value Ref Range Status   01/12/2021 1.69 (H) 0.76 - 1.27 mg/dL Final     GFR est AA   Date Value Ref Range Status   01/12/2021 48 (L) >59 mL/min/1.73 Final     Potassium   Date Value Ref Range Status   01/12/2021 4.5 3.5 - 5.2 mmol/L Final       HLD:  Has been compliant with meds  Yes  Compliant with low-fat diet. most of the time    Denies myalgias or other side effects. yes  The 10-year ASCVD risk score (Chloe Zavala, et al., 2013) is: 31.4%    Cholesterol, total   Date Value Ref Range Status   12/10/2020 195 <200 MG/DL Final     Triglyceride   Date Value Ref Range Status   12/10/2020 180 (H) <150 MG/DL Final     Comment:     The drugs N-acetylcysteine (NAC) and  Metamiszole have been found to cause falsely  low results in this chemical assay. Please  be sure to submit blood samples obtained  BEFORE administration of either of these  drugs to assure correct results.        HDL Cholesterol   Date Value Ref Range Status   12/10/2020 33 (L) 40 - 60 MG/DL Final     LDL, calculated   Date Value Ref Range Status   12/10/2020 126 (H) 0 - 100 MG/DL Final   ]  Key Antihyperlipidemia Meds             omega 3-DHA-EPA-fish oil 1,000 mg (120 mg-180 mg) capsule (Taking) Take 1 Tablet by mouth daily. fenofibrate nanocrystallized (TRICOR) 145 mg tablet (Taking) TAKE ONE TABLET BY MOUTH DAILY    ezetimibe (ZETIA) 10 mg tablet (Taking) TAKE ONE TABLET BY MOUTH DAILY    Omega-3 Fatty Acids (FISH OIL) 500 mg cap Take  by mouth. 2 tablets daily           ROS:     ROS  As stated in HPI, otherwise all others negative. The problem list was updated as a part of today's visit. Patient Active Problem List   Diagnosis Code    Essential hypertension I10    Mixed hyperlipidemia E78.2    Heavy smoker F17.200    Chronic bilateral low back pain with bilateral sciatica M54.42, M54.41, G89.29    Atherosclerosis of coronary artery I25.10    Renal artery stenosis (HCC) I70.1    CKD (chronic kidney disease) stage 3, GFR 30-59 ml/min ([facility]) N18.30    Coronary artery disease with stable angina pectoris (HCC) I25.118    Bilateral calf pain M79.661, M79.662    Bilateral leg numbness R20.0    Abdominal aortic aneurysm (AAA) without rupture ([facility]) I71.4       The PSH, FH were reviewed. SH:  Social History     Tobacco Use    Smoking status: Current Every Day Smoker     Packs/day: 1.00     Years: 42.00     Pack years: 42.00     Types: Cigarettes    Smokeless tobacco: Never Used   Vaping Use    Vaping Use: Not on file   Substance Use Topics    Alcohol use: Yes     Alcohol/week: 2.0 standard drinks     Types: 2 Standard drinks or equivalent per week    Drug use: No       Medications/Allergies:  Current Outpatient Medications on File Prior to Visit   Medication Sig Dispense Refill    multivits,Stress Formula-Zinc tablet Take 1 Tablet by mouth daily.  amLODIPine (NORVASC) 10 mg tablet TAKE 1/2 TABLET BY MOUTH DAILY 45 Tablet 1    omega 3-DHA-EPA-fish oil 1,000 mg (120 mg-180 mg) capsule Take 1 Tablet by mouth daily.       isosorbide mononitrate ER (IMDUR) 30 mg tablet TAKE ONE TABLET BY MOUTH DAILY 90 Tablet 0    clopidogreL (PLAVIX) 75 mg tab TAKE ONE TABLET BY MOUTH DAILY 90 Tablet 2    fenofibrate nanocrystallized (TRICOR) 145 mg tablet TAKE ONE TABLET BY MOUTH DAILY 90 Tablet 1    nebivoloL (Bystolic) 5 mg tablet TAKE ONE TABLET BY MOUTH DAILY 90 Tablet 3    ezetimibe (ZETIA) 10 mg tablet TAKE ONE TABLET BY MOUTH DAILY 90 Tab 1    cholecalciferol (Vitamin D3) 25 mcg (1,000 unit) cap Take 2 Caps by mouth every other day. 90 Cap 0    aspirin 81 mg chewable tablet Take 81 mg by mouth.  nitroglycerin (NITROSTAT) 0.4 mg SL tablet 0.4 mg by SubLINGual route.  ascorbic acid, vitamin C, (VITAMIN C) 500 mg tablet Take  by mouth.  [DISCONTINUED] dexAMETHasone (DECADRON) 0.5 mg tablet Take 2 tablets by mouth once for 1 dose. (Patient not taking: Reported on 11/11/2021) 2 Tablet 0    [DISCONTINUED] rivaroxaban (XARELTO) 20 mg tab tablet Take 1 Tab by mouth daily. 90 Tab 1    Omega-3 Fatty Acids (FISH OIL) 500 mg cap Take  by mouth. 2 tablets daily (Patient not taking: Reported on 11/11/2021)       No current facility-administered medications on file prior to visit. Allergies   Allergen Reactions    Simvastatin Other (comments)       Objective:  Visit Vitals  BP (!) 143/77   Pulse 68   Temp 98.5 °F (36.9 °C) (Temporal)   Resp 18   Ht 6' 1\" (1.854 m)   Wt 228 lb (103.4 kg)   SpO2 98%   BMI 30.08 kg/m²    Body mass index is 30.08 kg/m². Physical assessment  Physical Exam  Vitals and nursing note reviewed. Eyes:      Conjunctiva/sclera: Conjunctivae normal.      Pupils: Pupils are equal, round, and reactive to light. Neck:      Vascular: No JVD. Cardiovascular:      Rate and Rhythm: Normal rate and regular rhythm. Heart sounds: Normal heart sounds. No murmur heard. No friction rub. No gallop. Pulmonary:      Effort: Pulmonary effort is normal.      Breath sounds: Normal breath sounds. Musculoskeletal:         General: Normal range of motion. Cervical back: Normal range of motion. Skin:     General: Skin is warm and dry.    Neurological: Mental Status: He is alert and oriented to person, place, and time. Psychiatric:         Mood and Affect: Affect normal.         Cognition and Memory: Memory normal.         Judgment: Judgment normal.           Labwork and Ancillary Studies:    CBC w/Diff  Lab Results   Component Value Date/Time    WBC 7.9 02/08/2019 08:37 AM    HGB 14.4 02/08/2019 08:37 AM    PLATELET 390 95/80/2775 08:37 AM         Basic Metabolic Profile  Lab Results   Component Value Date/Time    Sodium 145 (H) 01/12/2021 12:00 AM    Potassium 4.5 01/12/2021 12:00 AM    Chloride 110 (H) 01/12/2021 12:00 AM    CO2 20 01/12/2021 12:00 AM    Anion gap 7 11/26/2019 10:41 AM    Glucose 82 01/12/2021 12:00 AM    BUN 29 (H) 01/12/2021 12:00 AM    Creatinine 1.69 (H) 01/12/2021 12:00 AM    BUN/Creatinine ratio 17 01/12/2021 12:00 AM    GFR est AA 48 (L) 01/12/2021 12:00 AM    GFR est non-AA 41 (L) 01/12/2021 12:00 AM    Calcium 9.6 01/12/2021 12:00 AM        Cholesterol  Lab Results   Component Value Date/Time    Cholesterol, total 195 12/10/2020 10:26 AM    HDL Cholesterol 33 (L) 12/10/2020 10:26 AM    LDL, calculated 126 (H) 12/10/2020 10:26 AM    Triglyceride 180 (H) 12/10/2020 10:26 AM    CHOL/HDL Ratio 5.9 (H) 12/10/2020 10:26 AM           I have discussed the diagnosis with the patient and the intended plan as seen in the above orders. The patient has received an After-Visit Summary and questions were answered concerning future plans. An After Visit Summary was printed and given to the patient. All diagnosis have been discussed with the patient and all of the patient's questions have been answered. Follow-up and Dispositions    · Return in about 3 months (around 2/11/2022) for HTN, HLD, 15 min, office only. Yair Townsend, [facility]  [address] [facility] [address]  [address]

## 2021-11-23 VITALS
WEIGHT: 228 LBS | TEMPERATURE: 98.5 F | HEART RATE: 68 BPM | BODY MASS INDEX: 30.22 KG/M2 | SYSTOLIC BLOOD PRESSURE: 136 MMHG | HEIGHT: 73 IN | DIASTOLIC BLOOD PRESSURE: 82 MMHG | RESPIRATION RATE: 18 BRPM | OXYGEN SATURATION: 98 %

## 2021-12-02 DIAGNOSIS — I10 ESSENTIAL HYPERTENSION: ICD-10-CM

## 2021-12-06 RX ORDER — ISOSORBIDE MONONITRATE 30 MG/1
TABLET, EXTENDED RELEASE ORAL
Qty: 90 TABLET | Refills: 0 | Status: SHIPPED | OUTPATIENT
Start: 2021-12-06 | End: 2022-01-18 | Stop reason: SDUPTHER

## 2022-01-18 DIAGNOSIS — I10 ESSENTIAL HYPERTENSION: ICD-10-CM

## 2022-01-18 DIAGNOSIS — E78.2 MIXED HYPERLIPIDEMIA: ICD-10-CM

## 2022-01-18 RX ORDER — EZETIMIBE 10 MG/1
10 TABLET ORAL DAILY
Qty: 90 TABLET | Refills: 1 | Status: SHIPPED | OUTPATIENT
Start: 2022-01-18

## 2022-01-18 RX ORDER — ISOSORBIDE MONONITRATE 30 MG/1
30 TABLET, EXTENDED RELEASE ORAL DAILY
Qty: 90 TABLET | Refills: 1 | Status: SHIPPED | OUTPATIENT
Start: 2022-01-18

## 2022-01-18 RX ORDER — CLOPIDOGREL BISULFATE 75 MG/1
TABLET ORAL
Qty: 90 TABLET | Refills: 2 | Status: SHIPPED | OUTPATIENT
Start: 2022-01-18

## 2022-01-18 RX ORDER — AMLODIPINE BESYLATE 10 MG/1
5 TABLET ORAL DAILY
Qty: 45 TABLET | Refills: 1 | Status: SHIPPED | OUTPATIENT
Start: 2022-01-18 | End: 2022-01-21 | Stop reason: SDUPTHER

## 2022-01-18 RX ORDER — NEBIVOLOL 5 MG/1
TABLET ORAL
Qty: 90 TABLET | Refills: 3 | Status: SHIPPED | OUTPATIENT
Start: 2022-01-18

## 2022-01-21 DIAGNOSIS — I10 ESSENTIAL HYPERTENSION: ICD-10-CM

## 2022-01-21 RX ORDER — AMLODIPINE BESYLATE 10 MG/1
5 TABLET ORAL DAILY
Qty: 45 TABLET | Refills: 1 | Status: SHIPPED | OUTPATIENT
Start: 2022-01-21

## 2022-03-18 PROBLEM — N18.30 CKD (CHRONIC KIDNEY DISEASE) STAGE 3, GFR 30-59 ML/MIN (HCC): Status: ACTIVE | Noted: 2019-08-26

## 2022-03-18 PROBLEM — R20.0 BILATERAL LEG NUMBNESS: Status: ACTIVE | Noted: 2020-08-14

## 2022-03-18 PROBLEM — I71.40 ABDOMINAL AORTIC ANEURYSM (AAA) WITHOUT RUPTURE (HCC): Status: ACTIVE | Noted: 2021-01-12

## 2022-03-19 PROBLEM — I10 ESSENTIAL HYPERTENSION: Status: ACTIVE | Noted: 2017-05-23

## 2022-03-19 PROBLEM — F17.200 HEAVY SMOKER: Status: ACTIVE | Noted: 2018-02-26

## 2022-03-19 PROBLEM — G89.29 CHRONIC BILATERAL LOW BACK PAIN WITH BILATERAL SCIATICA: Status: ACTIVE | Noted: 2018-02-26

## 2022-03-19 PROBLEM — I25.118 CORONARY ARTERY DISEASE WITH STABLE ANGINA PECTORIS (HCC): Status: ACTIVE | Noted: 2019-11-26

## 2022-03-19 PROBLEM — M79.661 BILATERAL CALF PAIN: Status: ACTIVE | Noted: 2020-08-14

## 2022-03-19 PROBLEM — E78.2 MIXED HYPERLIPIDEMIA: Status: ACTIVE | Noted: 2018-02-26

## 2022-03-19 PROBLEM — M54.41 CHRONIC BILATERAL LOW BACK PAIN WITH BILATERAL SCIATICA: Status: ACTIVE | Noted: 2018-02-26

## 2022-03-19 PROBLEM — M79.662 BILATERAL CALF PAIN: Status: ACTIVE | Noted: 2020-08-14

## 2022-03-19 PROBLEM — M54.42 CHRONIC BILATERAL LOW BACK PAIN WITH BILATERAL SCIATICA: Status: ACTIVE | Noted: 2018-02-26

## 2022-07-10 NOTE — PATIENT INSTRUCTIONS
Medicare Wellness Visit, Male The best way to live healthy is to have a lifestyle where you eat a well-balanced diet, exercise regularly, limit alcohol use, and quit all forms of tobacco/nicotine, if applicable. Regular preventive services are another way to keep healthy. Preventive services (vaccines, screening tests, monitoring & exams) can help personalize your care plan, which helps you manage your own care. Screening tests can find health problems at the earliest stages, when they are easiest to treat. [facility] follows the current, evidence-based guidelines published by the [facility] ([facility]) when recommending preventive services for our patients. Because we follow these guidelines, sometimes recommendations change over time as research supports it. (For example, a prostate screening blood test is no longer routinely recommended for men with no symptoms). Of course, you and your doctor may decide to screen more often for some diseases, based on your risk and co-morbidities (chronic disease you are already diagnosed with). Preventive services for you include: - Medicare offers their members a free annual wellness visit, which is time for you and your primary care provider to discuss and plan for your preventive service needs. Take advantage of this benefit every year! 
-All adults over age 72 should receive the recommended pneumonia vaccines. Current USPSTF guidelines recommend a series of two vaccines for the best pneumonia protection.  
-All adults should have a flu vaccine yearly and tetanus vaccine every 10 years. 
-All adults age 48 and older should receive the shingles vaccines (series of two vaccines).       
-All adults age 38-68 who are overweight should have a diabetes screening test once every three years.  
-Other screening tests & preventive services for persons with diabetes include: an eye exam to screen for diabetic retinopathy, a kidney function test, a foot exam, and stricter control over your cholesterol.  
-Cardiovascular screening for adults with routine risk involves an electrocardiogram (ECG) at intervals determined by the provider.  
-Colorectal cancer screening should be done for adults age 54-65 with no increased risk factors for colorectal cancer. There are a number of acceptable methods of screening for this type of cancer. Each test has its own benefits and drawbacks. Discuss with your provider what is most appropriate for you during your annual wellness visit. The different tests include: colonoscopy (considered the best screening method), a fecal occult blood test, a fecal DNA test, and sigmoidoscopy. 
-All adults born between [facility] should be screened once for Hepatitis C. 
-An Abdominal Aortic Aneurysm (AAA) Screening is recommended for men age 73-68 who has ever smoked in their lifetime. Here is a list of your current Health Maintenance items (your personalized list of preventive services) with a due date: 
Health Maintenance Due Topic Date Due  Shingles Vaccine (1 of 2) 01/12/2004  Colonoscopy  05/30/2018  Glaucoma Screening   01/12/2019  Pneumococcal Vaccine (2 of 2 - PPSV23) 02/08/2020  Flu Vaccine  08/01/2020 [place] Annual Well Visit  08/26/2020 Jeevan

## 2023-02-20 RX ORDER — NEBIVOLOL HYDROCHLORIDE 5 MG/1
TABLET ORAL
Qty: 30 TABLET | OUTPATIENT
Start: 2023-02-20

## 2023-05-01 RX ORDER — EZETIMIBE 10 MG/1
TABLET ORAL
Qty: 90 TABLET | OUTPATIENT
Start: 2023-05-01

## 2023-05-30 RX ORDER — EZETIMIBE 10 MG/1
TABLET ORAL
Qty: 90 TABLET | OUTPATIENT
Start: 2023-05-30